# Patient Record
Sex: FEMALE | Race: WHITE | URBAN - METROPOLITAN AREA
[De-identification: names, ages, dates, MRNs, and addresses within clinical notes are randomized per-mention and may not be internally consistent; named-entity substitution may affect disease eponyms.]

---

## 2018-06-05 ENCOUNTER — OUTPATIENT (OUTPATIENT)
Dept: OUTPATIENT SERVICES | Facility: HOSPITAL | Age: 83
LOS: 1 days | End: 2018-06-05
Payer: MEDICARE

## 2018-06-05 DIAGNOSIS — Z80.3 FAMILY HISTORY OF MALIGNANT NEOPLASM OF BREAST: ICD-10-CM

## 2018-06-05 DIAGNOSIS — I10 ESSENTIAL (PRIMARY) HYPERTENSION: ICD-10-CM

## 2018-06-05 DIAGNOSIS — Z01.818 ENCOUNTER FOR OTHER PREPROCEDURAL EXAMINATION: ICD-10-CM

## 2018-06-05 DIAGNOSIS — Z17.0 ESTROGEN RECEPTOR POSITIVE STATUS [ER+]: ICD-10-CM

## 2018-06-05 DIAGNOSIS — C50.412 MALIGNANT NEOPLASM OF UPPER-OUTER QUADRANT OF LEFT FEMALE BREAST: ICD-10-CM

## 2018-06-05 PROCEDURE — 93010 ELECTROCARDIOGRAM REPORT: CPT | Mod: NC

## 2018-06-07 ENCOUNTER — RESULT REVIEW (OUTPATIENT)
Age: 83
End: 2018-06-07

## 2018-06-07 PROCEDURE — 85027 COMPLETE CBC AUTOMATED: CPT

## 2018-06-07 PROCEDURE — G0463: CPT

## 2018-06-07 PROCEDURE — 80048 BASIC METABOLIC PNL TOTAL CA: CPT

## 2018-06-07 PROCEDURE — 93005 ELECTROCARDIOGRAM TRACING: CPT

## 2018-06-07 PROCEDURE — 36415 COLL VENOUS BLD VENIPUNCTURE: CPT

## 2018-06-14 ENCOUNTER — TRANSCRIPTION ENCOUNTER (OUTPATIENT)
Age: 83
End: 2018-06-14

## 2018-06-15 ENCOUNTER — OUTPATIENT (OUTPATIENT)
Dept: OUTPATIENT SERVICES | Facility: HOSPITAL | Age: 83
LOS: 1 days | End: 2018-06-15
Payer: MEDICARE

## 2018-06-15 ENCOUNTER — RESULT REVIEW (OUTPATIENT)
Age: 83
End: 2018-06-15

## 2018-06-15 DIAGNOSIS — Z17.0 ESTROGEN RECEPTOR POSITIVE STATUS [ER+]: ICD-10-CM

## 2018-06-15 DIAGNOSIS — Z80.3 FAMILY HISTORY OF MALIGNANT NEOPLASM OF BREAST: ICD-10-CM

## 2018-06-15 DIAGNOSIS — C50.412 MALIGNANT NEOPLASM OF UPPER-OUTER QUADRANT OF LEFT FEMALE BREAST: ICD-10-CM

## 2018-06-15 PROCEDURE — 19281 PERQ DEVICE BREAST 1ST IMAG: CPT

## 2018-06-15 PROCEDURE — 88305 TISSUE EXAM BY PATHOLOGIST: CPT

## 2018-06-15 PROCEDURE — 88307 TISSUE EXAM BY PATHOLOGIST: CPT | Mod: 26

## 2018-06-15 PROCEDURE — 19301 PARTIAL MASTECTOMY: CPT | Mod: LT

## 2018-06-15 PROCEDURE — 88307 TISSUE EXAM BY PATHOLOGIST: CPT

## 2018-06-15 PROCEDURE — C1889: CPT

## 2018-06-15 PROCEDURE — 38525 BIOPSY/REMOVAL LYMPH NODES: CPT | Mod: LT

## 2018-06-15 PROCEDURE — 76098 X-RAY EXAM SURGICAL SPECIMEN: CPT

## 2018-06-15 PROCEDURE — 88305 TISSUE EXAM BY PATHOLOGIST: CPT | Mod: 26

## 2018-12-02 ENCOUNTER — EMERGENCY (EMERGENCY)
Facility: HOSPITAL | Age: 83
LOS: 1 days | End: 2018-12-02
Attending: EMERGENCY MEDICINE
Payer: MEDICARE

## 2018-12-02 VITALS
RESPIRATION RATE: 14 BRPM | HEART RATE: 55 BPM | SYSTOLIC BLOOD PRESSURE: 120 MMHG | OXYGEN SATURATION: 96 % | TEMPERATURE: 97 F | HEIGHT: 64 IN | DIASTOLIC BLOOD PRESSURE: 71 MMHG | WEIGHT: 158.07 LBS

## 2018-12-02 VITALS — TEMPERATURE: 98 F | RESPIRATION RATE: 17 BRPM | OXYGEN SATURATION: 98 %

## 2018-12-02 LAB
ALBUMIN SERPL ELPH-MCNC: 3.4 G/DL — SIGNIFICANT CHANGE UP (ref 3.3–5)
ALP SERPL-CCNC: 67 U/L — SIGNIFICANT CHANGE UP (ref 40–120)
ALT FLD-CCNC: 17 U/L — SIGNIFICANT CHANGE UP (ref 12–78)
ANION GAP SERPL CALC-SCNC: 9 MMOL/L — SIGNIFICANT CHANGE UP (ref 5–17)
AST SERPL-CCNC: 20 U/L — SIGNIFICANT CHANGE UP (ref 15–37)
BASOPHILS # BLD AUTO: 0.02 K/UL — SIGNIFICANT CHANGE UP (ref 0–0.2)
BASOPHILS NFR BLD AUTO: 0.3 % — SIGNIFICANT CHANGE UP (ref 0–2)
BILIRUB SERPL-MCNC: 0.4 MG/DL — SIGNIFICANT CHANGE UP (ref 0.2–1.2)
BUN SERPL-MCNC: 25 MG/DL — HIGH (ref 7–23)
CALCIUM SERPL-MCNC: 8.4 MG/DL — LOW (ref 8.5–10.1)
CHLORIDE SERPL-SCNC: 104 MMOL/L — SIGNIFICANT CHANGE UP (ref 96–108)
CK MB BLD-MCNC: <1.6 % — SIGNIFICANT CHANGE UP (ref 0–3.5)
CK MB CFR SERPL CALC: <1 NG/ML — SIGNIFICANT CHANGE UP (ref 0–3.6)
CK SERPL-CCNC: 62 U/L — SIGNIFICANT CHANGE UP (ref 26–192)
CO2 SERPL-SCNC: 26 MMOL/L — SIGNIFICANT CHANGE UP (ref 22–31)
CREAT SERPL-MCNC: 0.85 MG/DL — SIGNIFICANT CHANGE UP (ref 0.5–1.3)
EOSINOPHIL # BLD AUTO: 0.2 K/UL — SIGNIFICANT CHANGE UP (ref 0–0.5)
EOSINOPHIL NFR BLD AUTO: 2.6 % — SIGNIFICANT CHANGE UP (ref 0–6)
GLUCOSE SERPL-MCNC: 148 MG/DL — HIGH (ref 70–99)
HCT VFR BLD CALC: 34.2 % — LOW (ref 34.5–45)
HGB BLD-MCNC: 11.5 G/DL — SIGNIFICANT CHANGE UP (ref 11.5–15.5)
IMM GRANULOCYTES NFR BLD AUTO: 0.6 % — SIGNIFICANT CHANGE UP (ref 0–1.5)
LYMPHOCYTES # BLD AUTO: 1.81 K/UL — SIGNIFICANT CHANGE UP (ref 1–3.3)
LYMPHOCYTES # BLD AUTO: 23.4 % — SIGNIFICANT CHANGE UP (ref 13–44)
MCHC RBC-ENTMCNC: 30.6 PG — SIGNIFICANT CHANGE UP (ref 27–34)
MCHC RBC-ENTMCNC: 33.6 GM/DL — SIGNIFICANT CHANGE UP (ref 32–36)
MCV RBC AUTO: 91 FL — SIGNIFICANT CHANGE UP (ref 80–100)
MONOCYTES # BLD AUTO: 1.85 K/UL — HIGH (ref 0–0.9)
MONOCYTES NFR BLD AUTO: 23.9 % — HIGH (ref 2–14)
NEUTROPHILS # BLD AUTO: 3.82 K/UL — SIGNIFICANT CHANGE UP (ref 1.8–7.4)
NEUTROPHILS NFR BLD AUTO: 49.2 % — SIGNIFICANT CHANGE UP (ref 43–77)
PLATELET # BLD AUTO: 170 K/UL — SIGNIFICANT CHANGE UP (ref 150–400)
POTASSIUM SERPL-MCNC: 3.5 MMOL/L — SIGNIFICANT CHANGE UP (ref 3.5–5.3)
POTASSIUM SERPL-SCNC: 3.5 MMOL/L — SIGNIFICANT CHANGE UP (ref 3.5–5.3)
PROT SERPL-MCNC: 7.4 G/DL — SIGNIFICANT CHANGE UP (ref 6–8.3)
RBC # BLD: 3.76 M/UL — LOW (ref 3.8–5.2)
RBC # FLD: 12.6 % — SIGNIFICANT CHANGE UP (ref 10.3–14.5)
SODIUM SERPL-SCNC: 139 MMOL/L — SIGNIFICANT CHANGE UP (ref 135–145)
TROPONIN I SERPL-MCNC: <.015 NG/ML — SIGNIFICANT CHANGE UP (ref 0.01–0.04)
WBC # BLD: 7.75 K/UL — SIGNIFICANT CHANGE UP (ref 3.8–10.5)
WBC # FLD AUTO: 7.75 K/UL — SIGNIFICANT CHANGE UP (ref 3.8–10.5)

## 2018-12-02 PROCEDURE — 82550 ASSAY OF CK (CPK): CPT

## 2018-12-02 PROCEDURE — 85027 COMPLETE CBC AUTOMATED: CPT

## 2018-12-02 PROCEDURE — 99284 EMERGENCY DEPT VISIT MOD MDM: CPT

## 2018-12-02 PROCEDURE — 80053 COMPREHEN METABOLIC PANEL: CPT

## 2018-12-02 PROCEDURE — 71045 X-RAY EXAM CHEST 1 VIEW: CPT | Mod: 26

## 2018-12-02 PROCEDURE — 93005 ELECTROCARDIOGRAM TRACING: CPT

## 2018-12-02 PROCEDURE — 36415 COLL VENOUS BLD VENIPUNCTURE: CPT

## 2018-12-02 PROCEDURE — 84484 ASSAY OF TROPONIN QUANT: CPT

## 2018-12-02 PROCEDURE — 82553 CREATINE MB FRACTION: CPT

## 2018-12-02 PROCEDURE — 99284 EMERGENCY DEPT VISIT MOD MDM: CPT | Mod: 25

## 2018-12-02 PROCEDURE — 71045 X-RAY EXAM CHEST 1 VIEW: CPT

## 2018-12-02 PROCEDURE — 99285 EMERGENCY DEPT VISIT HI MDM: CPT

## 2018-12-02 RX ORDER — SODIUM CHLORIDE 9 MG/ML
1000 INJECTION INTRAMUSCULAR; INTRAVENOUS; SUBCUTANEOUS ONCE
Qty: 0 | Refills: 0 | Status: COMPLETED | OUTPATIENT
Start: 2018-12-02 | End: 2018-12-02

## 2018-12-02 RX ADMIN — SODIUM CHLORIDE 1000 MILLILITER(S): 9 INJECTION INTRAMUSCULAR; INTRAVENOUS; SUBCUTANEOUS at 12:18

## 2018-12-02 NOTE — ED PROVIDER NOTE - CHPI ED SYMPTOMS NEG
no diaphoresis/no cough/no nausea/no shortness of breath/no chest pain/no back pain/no vomiting/no dizziness/no fever/no chills

## 2018-12-02 NOTE — CONSULT NOTE ADULT - SUBJECTIVE AND OBJECTIVE BOX
[facility] - Shanell Martinez, Danae, [place], Florencio, Ran Jameson  Office Number: [phone]    Initial Consult Note    CHIEF COMPLAINT: Patient is a 83y old  Female who presents with a chief complaint of syncope    HPI:  84 y/o female biba s/p syncopal episode x 1 hour ago. Pt states she was standing at [employer] and then she became slightly dizzy. States she then sat down but she thinks she passed out for a few seconds. As per pts friends who witness episode, they are not sure if pt lost consciousness at all as she was answering her friends right away after syncopal episode. Pt asymptomatic in ED. Denies any other complaints. States she feels good. States she had cereal for breakfast this morning as per usual. Denies n/v, f/c, chest pain, sob, numbness, tingling, headache, visual changes.    Has not seen a cardiologist in the past. Did not eat or drink 1 hour prior to [employer].  Has a history of HTN and breast ca.  s/p radiation for breast ca, recently started on arimidex.  On 4 anti-HTN medications (HCTZ, atenolol, Lisinopril and Norvasc)  No change in exercise tolerance recently, and she is generally very active. She does report some reflux symptoms since Thanksgiving.    PAST MEDICAL & SURGICAL HISTORY:  Hypertension  No significant past surgical history      SOCIAL HISTORY:  No tobacco, ethanol, or drug abuse.    FAMILY HISTORY:    No family history of acute MI or sudden cardiac death.    MEDICATIONS  (STANDING):    MEDICATIONS  (PRN):      Allergies    No Known Allergies    Intolerances        REVIEW OF SYSTEMS:    CONSTITUTIONAL: No weakness, fevers or chills  EYES/ENT: No visual changes;  No vertigo or throat pain   NECK: No pain or stiffness  RESPIRATORY: No cough, wheezing, hemoptysis; No shortness of breath  CARDIOVASCULAR: No chest pain or palpitations  GASTROINTESTINAL: No abdominal pain. No nausea, vomiting, or hematemesis; No diarrhea or constipation. No melena or hematochezia.  GENITOURINARY: No dysuria, frequency or hematuria  NEUROLOGICAL: No numbness or weakness, + dizziness   SKIN: No itching or rash  All other review of systems is negative unless indicated above    VITAL SIGNS:   Vital Signs Last 24 Hrs  T(C): 36.4 (02 Dec 2018 13:51), Max: 36.4 (02 Dec 2018 13:51)  T(F): 97.6 (02 Dec 2018 13:51), Max: 97.6 (02 Dec 2018 13:51)  HR: 55 (02 Dec 2018 12:17) (55 - 55)  BP: 134/59 (02 Dec 2018 12:17) (120/71 - 134/59)  BP(mean): --  RR: 17 (02 Dec 2018 13:51) (14 - 17)  SpO2: 98% (02 Dec 2018 13:51) (96% - 99%)    I&O's Summary      On Exam:    Constitutional: NAD, alert and oriented x 3  Lungs:  Non-labored, breath sounds are clear bilaterally, No wheezing, rales or rhonchi  Cardiovascular: RRR.  S1 and S2 positive.  No murmurs, rubs, gallops or clicks  Gastrointestinal: Bowel Sounds present, soft, nontender.   Lymph: No peripheral edema. No cervical lymphadenopathy.  Neurological: Alert, no focal deficits  Skin: No rashes or ulcers   Psych:  Mood & affect appropriate.    LABS: All Labs Reviewed:                        11.5   7.75  )-----------( 170      ( 02 Dec 2018 12:23 )             34.2     02 Dec 2018 12:23    139    |  104    |  25     ----------------------------<  148    3.5     |  26     |  0.85     Ca    8.4        02 Dec 2018 12:23    TPro  7.4    /  Alb  3.4    /  TBili  0.4    /  DBili  x      /  AST  20     /  ALT  17     /  AlkPhos  67     02 Dec 2018 12:23      CARDIAC MARKERS ( 02 Dec 2018 12:23 )  <.015 ng/mL / x     / 62 U/L / x     / <1.0 ng/mL      Blood Culture:         RADIOLOGY:    EKG: SB, LAD, ant t wave inversions

## 2018-12-02 NOTE — ED PROVIDER NOTE - PHYSICAL EXAMINATION
Head- NC/AT. No gonzales signs, no raccoon eyes, no hemotympanum, no contusions, no hematoma, no dental injuries.  Spine- no midline or paraspinal tenderness of cspine, thoracic spine or lumbar spine. No signs of back trauma, no masses, no abrasions, no lacerations, no redness, no bruising.  Neck- supple, no midline tenderness to palpation, + FROM, no abrasions, no ecchymosis.  Neuro- EOM intact, no nystagmus. Pelvis stable. Pt able to straight leg raise BL. NVI, good distal pulses x 4 extremities, capillary refill <2 sec x 4 extremities, sensation intact throughout, 5/5 motor x 4 extremities. Cerebellar exam good. Gait normal without limp. Negative Rombergs test. DTRs normal x 4 extremities.  Chest- no chest wall tenderness, equal expansion BL. No clavicular tenderness BL. No bruising, no deformity, no redness, no abrasions.  Extremities- No bony tenderness of upper or lower extremities BL except where noted. FROM shoulders, elbows, wrists, hips, knees, ankles. NVI, good distal pulses x 4 extremities, capillary refill <2 sec x 4 extremities, sensation intact throughout, 5/5 motor x 4 extremities. No calf tenderness BL. No bruising, no deformity, no redness, no swelling.

## 2018-12-02 NOTE — ED PROVIDER NOTE - OBJECTIVE STATEMENT
84 y/o female biba s/p syncopal episode x 1 hour ago. Pt states she was standing at [employer] and then she became slightly dizzy. States she then sat down but she thinks she passed out for a few seconds. As per pts friends who witness episode, they are not sure if pt lost consciousness at all as she was answering her friends right away after syncopal episode. Pt asymptomatic in ED. Denies any other complaints. States she feels good. States she had cereal for breakfast this morning as per usual. Denies n/v, f/c, chest pain, sob, numbness, tingling, headache, visual changes.

## 2018-12-02 NOTE — ED PROVIDER NOTE - MEDICAL DECISION MAKING DETAILS
labs, cardiacs, cardiac monitor, orthostatic BP/HR, fluids, cardiac consult, reeval labs, cardiacs, cardiac monitor, orthostatic BP/HR WNL, fluids, cardiac consult, reeval  Please follow up with your Primary MD in 24-48 hr. Please follow up with cardiology Dr Tong within 3 days- call tomorrow for an appointment. Keep hydrated with plenty of fluids. Return to ED immediately if condition worsens or any concerns.  Seek immediate medical care for any new/worsening signs or symptoms.

## 2018-12-02 NOTE — ED ADULT NURSE NOTE - OBJECTIVE STATEMENT
Pt. received alert and oriented x4 with chief complaint of near syncope while standing in [employer]. Pt. states she was feeling dizzy upon waking up this morning. Pt. denies LOC. Near syncope witnessed by 2 people sitting next to patient. Pt. placed on continuous cardiac monitor with continuous pulse ox. EKG performed at bedside upon arrival.

## 2018-12-02 NOTE — ED PROVIDER NOTE - ATTENDING CONTRIBUTION TO CARE
82 yo F p/w was standing for prolonged period at [employer], then felt dizzy and passed out x few sec. NO sz activity. no trauma. no cp/sob/palp. no numb/ting/focal weak. no CP/sob. no recent MAHAN / easy fatigue. no numb/ting/focal weak. no agg/allev factors. no other inj or co.  exam : mm moist,  neck supple. no ext signs of head trauma. No spinal tend. abd soft NT. no HSM. no cvat. no other acute findings.  labs / xr with no acute, pt seen and cleared by cardio for outpt f u

## 2018-12-02 NOTE — CONSULT NOTE ADULT - ASSESSMENT
Mrs. Winchester is an 83 year old female with HTN and breast ca s/p radiation, presenting with a syncopal episode at [employer]. She has had similar episodes like this in the past, with a typical prodrome.     - The episode sounds vagal in origin. I have recommended that she stay hydrated and eat before [employer]  - Check orthostatics. Agree with IVF.  - She is also on 4 anti-hypertensives, which may be too much. She is to check her BP at home and record them for me prior to changing the regimen.  - No sign of acute ischemia. EKG is a SR with LAD and non-specific T wave abn. She is to have an echocardiogram in our office.  - Troponin is negative.  - I do not suspect a bradyarrhythmia at this time.  - She will follow up with me in office in 1-2 weeks. No cardiac contraindication to d/c home

## 2018-12-02 NOTE — ED PROVIDER NOTE - PROGRESS NOTE DETAILS
spoke with cardiology Dr Tong who will come see pt. Dr Tong saw pt. Dr Tong states likely vasovagal episode. pt to f/u with dr tong out pt. Reevaluated patient at bedside.  Patient feeling much improved.  Discussed the results of all diagnostic testing in ED and copies of all reports given.   An opportunity to ask questions was given.  Discussed the importance of prompt, close medical follow-up.  Patient will return with any changes, concerns or persistent / worsening symptoms.  Understanding of all instructions verbalized.

## 2018-12-02 NOTE — ED ADULT NURSE NOTE - CHIEF COMPLAINT QUOTE
"I passed out in [employer]."  pt was standing for prayer, syncopized and fell forward, did not hit head

## 2018-12-02 NOTE — ED ADULT TRIAGE NOTE - CHIEF COMPLAINT QUOTE
"I passed out in [employer]."  pt was standing for prayer, syncopized and fell forward, did not hit head

## 2018-12-05 ENCOUNTER — APPOINTMENT (OUTPATIENT)
Dept: CARDIOLOGY | Facility: CLINIC | Age: 83
End: 2018-12-05
Payer: MEDICARE

## 2018-12-05 ENCOUNTER — NON-APPOINTMENT (OUTPATIENT)
Age: 83
End: 2018-12-05

## 2018-12-05 VITALS
OXYGEN SATURATION: 96 % | HEIGHT: 64 IN | BODY MASS INDEX: 25.61 KG/M2 | DIASTOLIC BLOOD PRESSURE: 69 MMHG | WEIGHT: 150 LBS | SYSTOLIC BLOOD PRESSURE: 147 MMHG | HEART RATE: 61 BPM

## 2018-12-05 DIAGNOSIS — Z85.3 PERSONAL HISTORY OF MALIGNANT NEOPLASM OF BREAST: ICD-10-CM

## 2018-12-05 DIAGNOSIS — R07.9 CHEST PAIN, UNSPECIFIED: ICD-10-CM

## 2018-12-05 PROBLEM — I10 ESSENTIAL (PRIMARY) HYPERTENSION: Chronic | Status: ACTIVE | Noted: 2018-12-02

## 2018-12-05 PROCEDURE — 93000 ELECTROCARDIOGRAM COMPLETE: CPT

## 2018-12-05 PROCEDURE — 99215 OFFICE O/P EST HI 40 MIN: CPT

## 2018-12-05 NOTE — HISTORY OF PRESENT ILLNESS
[FreeTextEntry1] : Kelsey is an 83 year old female with HTN, here for follow up after an ER visit for syncope.\par \par Her past medical history notable for hypertension, and breast cancer status post radiation. She has not been feeling well since she was started on Arimidex.\par \par On December 2, 2018, she had a syncopal episode while at [employer]. She remembers standing up, feeling flushed, dizzy, lightheaded, with a short loss of consciousness. A similar episode occurred about 5 years ago at [employer].\par \par She has not seen a cardiologist in the past. She did not drink or eat a large breakfast prior to [employer]. She is currently on 4 antihypertensive medications including HCTZ, atenolol, lisinopril, and amlodipine.\par \par She also reports a mild chest pain, and reflux symptoms since Thanksgiving. She denies shortness of breath, and a change in exercise tolerance. She also denies lower extremity swelling, orthopnea, PND and palpitations. She has not had any symptoms since stopping Arimidex.

## 2018-12-05 NOTE — PHYSICAL EXAM
[General Appearance - Well Developed] : well developed [Normal Appearance] : normal appearance [Well Groomed] : well groomed [General Appearance - Well Nourished] : well nourished [No Deformities] : no deformities [General Appearance - In No Acute Distress] : no acute distress [Normal Conjunctiva] : the conjunctiva exhibited no abnormalities [Eyelids - No Xanthelasma] : the eyelids demonstrated no xanthelasmas [Normal Oral Mucosa] : normal oral mucosa [No Oral Pallor] : no oral pallor [No Oral Cyanosis] : no oral cyanosis [Normal Jugular Venous A Waves Present] : normal jugular venous A waves present [Normal Jugular Venous V Waves Present] : normal jugular venous V waves present [No Jugular Venous Ahmadi A Waves] : no jugular venous ahmadi A waves [Respiration, Rhythm And Depth] : normal respiratory rhythm and effort [Exaggerated Use Of Accessory Muscles For Inspiration] : no accessory muscle use [Auscultation Breath Sounds / Voice Sounds] : lungs were clear to auscultation bilaterally [Normal Rate] : normal [Normal S1] : normal S1 [Normal S2] : normal S2 [No Murmur] : no murmurs heard [2+] : left 2+ [No Abnormalities] : the abdominal aorta was not enlarged and no bruit was heard [No Pitting Edema] : no pitting edema present [Abdomen Soft] : soft [Abdomen Tenderness] : non-tender [Abdomen Mass (___ Cm)] : no abdominal mass palpated [Abnormal Walk] : normal gait [Gait - Sufficient For Exercise Testing] : the gait was sufficient for exercise testing [Nail Clubbing] : no clubbing of the fingernails [Cyanosis, Localized] : no localized cyanosis [Petechial Hemorrhages (___cm)] : no petechial hemorrhages [Skin Color & Pigmentation] : normal skin color and pigmentation [] : no rash [No Venous Stasis] : no venous stasis [Skin Lesions] : no skin lesions [No Skin Ulcers] : no skin ulcer [No Xanthoma] : no  xanthoma was observed [Oriented To Time, Place, And Person] : oriented to person, place, and time [Affect] : the affect was normal [Mood] : the mood was normal [No Anxiety] : not feeling anxious [S3] : no S3 [S4] : no S4 [Right Carotid Bruit] : no bruit heard over the right carotid [Left Carotid Bruit] : no bruit heard over the left carotid [Right Femoral Bruit] : no bruit heard over the right femoral artery [Left Femoral Bruit] : no bruit heard over the left femoral artery

## 2018-12-05 NOTE — REASON FOR VISIT
[Follow-Up - From Hospitalization] : follow-up of a recent hospitalization for [Syncope] : syncope [Family Member] : family member

## 2018-12-05 NOTE — DISCUSSION/SUMMARY
[___ Month(s)] : [unfilled] month(s) [With Me] : with me [FreeTextEntry1] : Kelsey is an 83 year old female with HTN, here for follow up after an ER visit for syncope.\par \par Her syncopal episode sounds vagal in origin. She is mildly orthostatic today, which suggests this etiology ( sitting, 134 standing_. She is on 4 antihypertensive medicines, and it is likely that her pressure dropped during the event.\par \par I recommended that she stay hydrated, and to avoid frequent sitting and standing at [employer]. Her EKG is a sinus rhythm with a left axis deviation, and poor R-wave progression. She'll have a 2-D echo to evaluate for structural heart disease in the setting of this abnormal EKG, chest pain, and syncope. She will also have an nuclear stress test to evaluate for ischemia.\par \par Her blood pressure is elevated, though she did not take any of her pills today. I recommended to the patient and son to check her blood pressures at home for the next 2 weeks and to call with results. If her BPs are controlled, I will likely stop her diuretic pill, in the setting of possible dehydration and syncope.\par \par I will call her with the results of the above tests. She knows to call if any issues or concerns. She will stay active, and eat right. She will follow up with me in 1-2 months.

## 2018-12-19 ENCOUNTER — APPOINTMENT (OUTPATIENT)
Dept: CARDIOLOGY | Facility: CLINIC | Age: 83
End: 2018-12-19
Payer: MEDICARE

## 2018-12-19 PROCEDURE — 93306 TTE W/DOPPLER COMPLETE: CPT

## 2018-12-20 ENCOUNTER — EMERGENCY (EMERGENCY)
Facility: HOSPITAL | Age: 83
LOS: 1 days | Discharge: HOME | End: 2018-12-20
Attending: EMERGENCY MEDICINE | Admitting: EMERGENCY MEDICINE
Payer: MEDICARE

## 2018-12-20 VITALS
HEART RATE: 86 BPM | HEIGHT: 64 IN | OXYGEN SATURATION: 97 % | DIASTOLIC BLOOD PRESSURE: 100 MMHG | RESPIRATION RATE: 16 BRPM | TEMPERATURE: 98 F | WEIGHT: 149.91 LBS | SYSTOLIC BLOOD PRESSURE: 178 MMHG

## 2018-12-20 VITALS
DIASTOLIC BLOOD PRESSURE: 71 MMHG | OXYGEN SATURATION: 97 % | SYSTOLIC BLOOD PRESSURE: 144 MMHG | RESPIRATION RATE: 15 BRPM | TEMPERATURE: 98 F | HEART RATE: 58 BPM

## 2018-12-20 DIAGNOSIS — Z98.890 OTHER SPECIFIED POSTPROCEDURAL STATES: Chronic | ICD-10-CM

## 2018-12-20 LAB
ALBUMIN SERPL ELPH-MCNC: 3.7 G/DL — SIGNIFICANT CHANGE UP (ref 3.3–5)
ALP SERPL-CCNC: 76 U/L — SIGNIFICANT CHANGE UP (ref 40–120)
ALT FLD-CCNC: 23 U/L — SIGNIFICANT CHANGE UP (ref 12–78)
ANION GAP SERPL CALC-SCNC: 8 MMOL/L — SIGNIFICANT CHANGE UP (ref 5–17)
APTT BLD: 32.2 SEC — SIGNIFICANT CHANGE UP (ref 28.5–37)
AST SERPL-CCNC: 21 U/L — SIGNIFICANT CHANGE UP (ref 15–37)
BASOPHILS # BLD AUTO: 0.02 K/UL — SIGNIFICANT CHANGE UP (ref 0–0.2)
BASOPHILS NFR BLD AUTO: 0.3 % — SIGNIFICANT CHANGE UP (ref 0–2)
BILIRUB SERPL-MCNC: 0.4 MG/DL — SIGNIFICANT CHANGE UP (ref 0.2–1.2)
BUN SERPL-MCNC: 19 MG/DL — SIGNIFICANT CHANGE UP (ref 7–23)
CALCIUM SERPL-MCNC: 8.9 MG/DL — SIGNIFICANT CHANGE UP (ref 8.5–10.1)
CHLORIDE SERPL-SCNC: 102 MMOL/L — SIGNIFICANT CHANGE UP (ref 96–108)
CK MB CFR SERPL CALC: 1.5 NG/ML — SIGNIFICANT CHANGE UP (ref 0–3.6)
CO2 SERPL-SCNC: 28 MMOL/L — SIGNIFICANT CHANGE UP (ref 22–31)
CREAT SERPL-MCNC: 0.77 MG/DL — SIGNIFICANT CHANGE UP (ref 0.5–1.3)
EOSINOPHIL # BLD AUTO: 0.2 K/UL — SIGNIFICANT CHANGE UP (ref 0–0.5)
EOSINOPHIL NFR BLD AUTO: 3.1 % — SIGNIFICANT CHANGE UP (ref 0–6)
GLUCOSE SERPL-MCNC: 122 MG/DL — HIGH (ref 70–99)
HCT VFR BLD CALC: 36.5 % — SIGNIFICANT CHANGE UP (ref 34.5–45)
HGB BLD-MCNC: 12.5 G/DL — SIGNIFICANT CHANGE UP (ref 11.5–15.5)
IMM GRANULOCYTES NFR BLD AUTO: 0.3 % — SIGNIFICANT CHANGE UP (ref 0–1.5)
INR BLD: 1.06 RATIO — SIGNIFICANT CHANGE UP (ref 0.88–1.16)
LYMPHOCYTES # BLD AUTO: 1.55 K/UL — SIGNIFICANT CHANGE UP (ref 1–3.3)
LYMPHOCYTES # BLD AUTO: 23.8 % — SIGNIFICANT CHANGE UP (ref 13–44)
MCHC RBC-ENTMCNC: 30.9 PG — SIGNIFICANT CHANGE UP (ref 27–34)
MCHC RBC-ENTMCNC: 34.2 GM/DL — SIGNIFICANT CHANGE UP (ref 32–36)
MCV RBC AUTO: 90.1 FL — SIGNIFICANT CHANGE UP (ref 80–100)
MONOCYTES # BLD AUTO: 1.67 K/UL — HIGH (ref 0–0.9)
MONOCYTES NFR BLD AUTO: 25.6 % — HIGH (ref 2–14)
NEUTROPHILS # BLD AUTO: 3.06 K/UL — SIGNIFICANT CHANGE UP (ref 1.8–7.4)
NEUTROPHILS NFR BLD AUTO: 46.9 % — SIGNIFICANT CHANGE UP (ref 43–77)
NRBC # BLD: 0 /100 WBCS — SIGNIFICANT CHANGE UP (ref 0–0)
PLATELET # BLD AUTO: 195 K/UL — SIGNIFICANT CHANGE UP (ref 150–400)
POTASSIUM SERPL-MCNC: 3.4 MMOL/L — LOW (ref 3.5–5.3)
POTASSIUM SERPL-SCNC: 3.4 MMOL/L — LOW (ref 3.5–5.3)
PROT SERPL-MCNC: 7.9 G/DL — SIGNIFICANT CHANGE UP (ref 6–8.3)
PROTHROM AB SERPL-ACNC: 12.1 SEC — SIGNIFICANT CHANGE UP (ref 10–12.9)
RBC # BLD: 4.05 M/UL — SIGNIFICANT CHANGE UP (ref 3.8–5.2)
RBC # FLD: 12.9 % — SIGNIFICANT CHANGE UP (ref 10.3–14.5)
SODIUM SERPL-SCNC: 138 MMOL/L — SIGNIFICANT CHANGE UP (ref 135–145)
TROPONIN I SERPL-MCNC: <.015 NG/ML — SIGNIFICANT CHANGE UP (ref 0.01–0.04)
WBC # BLD: 6.52 K/UL — SIGNIFICANT CHANGE UP (ref 3.8–10.5)
WBC # FLD AUTO: 6.52 K/UL — SIGNIFICANT CHANGE UP (ref 3.8–10.5)

## 2018-12-20 PROCEDURE — 85610 PROTHROMBIN TIME: CPT

## 2018-12-20 PROCEDURE — 99284 EMERGENCY DEPT VISIT MOD MDM: CPT | Mod: 25

## 2018-12-20 PROCEDURE — 99284 EMERGENCY DEPT VISIT MOD MDM: CPT

## 2018-12-20 PROCEDURE — 80053 COMPREHEN METABOLIC PANEL: CPT

## 2018-12-20 PROCEDURE — 85730 THROMBOPLASTIN TIME PARTIAL: CPT

## 2018-12-20 PROCEDURE — 85027 COMPLETE CBC AUTOMATED: CPT

## 2018-12-20 PROCEDURE — 36415 COLL VENOUS BLD VENIPUNCTURE: CPT

## 2018-12-20 PROCEDURE — 82553 CREATINE MB FRACTION: CPT

## 2018-12-20 PROCEDURE — 93005 ELECTROCARDIOGRAM TRACING: CPT

## 2018-12-20 PROCEDURE — 84484 ASSAY OF TROPONIN QUANT: CPT

## 2018-12-20 NOTE — ED ADULT TRIAGE NOTE - CHIEF COMPLAINT QUOTE
feeling lightheaded  since afternoon went to urgent care sent to [facility] for hypertension feeling lightheaded  since afternoon went to urgent care sent to [facility] for hypertension  recently taken off lisinopril 40 & atenolol 100

## 2018-12-20 NOTE — ED PROVIDER NOTE - OBJECTIVE STATEMENT
83 female sent to ER from urgent care c/o dizziness and elevated blood pressure, 195/97, patient state she took her blood pressure medications this morning 8:30am, HCTZ 25mg and amlodipine 5mg. Patient states she ate bowel of soup for lunch and dinner that contained salt.

## 2018-12-20 NOTE — ED ADULT NURSE NOTE - CHPI ED NUR SYMPTOMS NEG
no vomiting/no diaphoresis/no back pain/no shortness of breath/no congestion/no chest pain/no chills/no nausea/no dizziness/no fever/no syncope

## 2018-12-20 NOTE — ED ADULT NURSE NOTE - NSIMPLEMENTINTERV_GEN_ALL_ED
Implemented All Universal Safety Interventions:  [place] to call system. Call bell, personal items and telephone within reach. Instruct patient to call for assistance. Room bathroom lighting operational. Non-slip footwear when patient is off stretcher. Physically safe environment: no spills, clutter or unnecessary equipment. Stretcher in lowest position, wheels locked, appropriate side rails in place.

## 2018-12-20 NOTE — ED PROVIDER NOTE - PROGRESS NOTE DETAILS
patient feeling well, dizziness resolved, told to watch diet regarding salt intake, blood pressure improved, agrees to f/u with cardiologist and PMD, understands to return to ER for worsening of symptoms

## 2018-12-20 NOTE — ED ADULT NURSE NOTE - NS PRO AD PATIENT TYPE
Health Care Proxy (HCP) Inflammation Suggestive Of Cancer Camouflage Histology Text: There was a dense lymphocytic infiltrate which prevented adequate histologic evaluation of adjacent structures.

## 2018-12-20 NOTE — ED ADULT NURSE REASSESSMENT NOTE - NS ED NURSE REASSESS COMMENT FT1
Pts b/p improved -pt d/juancarlos home verbalized understanding of d./c instruction- ambulated with son from ED in stable condition-

## 2018-12-20 NOTE — ED ADULT NURSE NOTE - CHIEF COMPLAINT QUOTE
feeling lightheaded  since afternoon went to urgent care sent to [facility] for hypertension  recently taken off lisinopril 40 & atenolol 100

## 2018-12-31 ENCOUNTER — APPOINTMENT (OUTPATIENT)
Dept: CARDIOLOGY | Facility: CLINIC | Age: 83
End: 2018-12-31
Payer: MEDICARE

## 2018-12-31 PROCEDURE — A9500: CPT

## 2018-12-31 PROCEDURE — 78452 HT MUSCLE IMAGE SPECT MULT: CPT

## 2018-12-31 PROCEDURE — 93015 CV STRESS TEST SUPVJ I&R: CPT

## 2019-01-17 NOTE — ED ADULT NURSE NOTE - AS TEMP SITE
[facility]  Colonoscopy schedule 2/7/19 [facility]  Echo and Angiogram at [facility] done 4/2018. Patient will call and book appointment with his cardiologist for clearance.    oral

## 2019-03-13 ENCOUNTER — TRANSCRIPTION ENCOUNTER (OUTPATIENT)
Age: 84
End: 2019-03-13

## 2019-08-17 ENCOUNTER — TRANSCRIPTION ENCOUNTER (OUTPATIENT)
Age: 84
End: 2019-08-17

## 2019-10-24 ENCOUNTER — APPOINTMENT (OUTPATIENT)
Dept: CARDIOLOGY | Facility: CLINIC | Age: 84
End: 2019-10-24
Payer: MEDICARE

## 2019-10-24 PROCEDURE — 93306 TTE W/DOPPLER COMPLETE: CPT

## 2020-04-09 ENCOUNTER — INPATIENT (INPATIENT)
Facility: HOSPITAL | Age: 85
LOS: 4 days | Discharge: HOME | DRG: 64 | End: 2020-04-14
Attending: INTERNAL MEDICINE | Admitting: INTERNAL MEDICINE
Payer: MEDICARE

## 2020-04-09 VITALS
RESPIRATION RATE: 16 BRPM | TEMPERATURE: 98 F | DIASTOLIC BLOOD PRESSURE: 84 MMHG | SYSTOLIC BLOOD PRESSURE: 187 MMHG | HEART RATE: 65 BPM | OXYGEN SATURATION: 97 % | HEIGHT: 64 IN | WEIGHT: 145.06 LBS

## 2020-04-09 DIAGNOSIS — Z98.890 OTHER SPECIFIED POSTPROCEDURAL STATES: Chronic | ICD-10-CM

## 2020-04-09 LAB
ALBUMIN SERPL ELPH-MCNC: 3.6 G/DL — SIGNIFICANT CHANGE UP (ref 3.3–5)
ALP SERPL-CCNC: 86 U/L — SIGNIFICANT CHANGE UP (ref 40–120)
ALT FLD-CCNC: 18 U/L — SIGNIFICANT CHANGE UP (ref 12–78)
ANION GAP SERPL CALC-SCNC: 9 MMOL/L — SIGNIFICANT CHANGE UP (ref 5–17)
APPEARANCE UR: ABNORMAL
AST SERPL-CCNC: 21 U/L — SIGNIFICANT CHANGE UP (ref 15–37)
BACTERIA # UR AUTO: ABNORMAL
BILIRUB SERPL-MCNC: 0.4 MG/DL — SIGNIFICANT CHANGE UP (ref 0.2–1.2)
BILIRUB UR-MCNC: NEGATIVE — SIGNIFICANT CHANGE UP
BUN SERPL-MCNC: 14 MG/DL — SIGNIFICANT CHANGE UP (ref 7–23)
CALCIUM SERPL-MCNC: 8.9 MG/DL — SIGNIFICANT CHANGE UP (ref 8.5–10.1)
CHLORIDE SERPL-SCNC: 100 MMOL/L — SIGNIFICANT CHANGE UP (ref 96–108)
CO2 SERPL-SCNC: 27 MMOL/L — SIGNIFICANT CHANGE UP (ref 22–31)
COLOR SPEC: YELLOW — SIGNIFICANT CHANGE UP
CREAT SERPL-MCNC: 0.72 MG/DL — SIGNIFICANT CHANGE UP (ref 0.5–1.3)
DIFF PNL FLD: ABNORMAL
EPI CELLS # UR: SIGNIFICANT CHANGE UP
GLUCOSE SERPL-MCNC: 153 MG/DL — HIGH (ref 70–99)
GLUCOSE UR QL: 100 MG/DL
HCT VFR BLD CALC: 39.5 % — SIGNIFICANT CHANGE UP (ref 34.5–45)
HGB BLD-MCNC: 13.6 G/DL — SIGNIFICANT CHANGE UP (ref 11.5–15.5)
KETONES UR-MCNC: ABNORMAL
LACTATE SERPL-SCNC: 1.6 MMOL/L — SIGNIFICANT CHANGE UP (ref 0.7–2)
LEUKOCYTE ESTERASE UR-ACNC: ABNORMAL
LIDOCAIN IGE QN: 215 U/L — SIGNIFICANT CHANGE UP (ref 73–393)
MAGNESIUM SERPL-MCNC: 1.7 MG/DL — SIGNIFICANT CHANGE UP (ref 1.6–2.6)
MCHC RBC-ENTMCNC: 28.8 PG — SIGNIFICANT CHANGE UP (ref 27–34)
MCHC RBC-ENTMCNC: 34.4 GM/DL — SIGNIFICANT CHANGE UP (ref 32–36)
MCV RBC AUTO: 83.7 FL — SIGNIFICANT CHANGE UP (ref 80–100)
NITRITE UR-MCNC: NEGATIVE — SIGNIFICANT CHANGE UP
NRBC # BLD: 0 /100 WBCS — SIGNIFICANT CHANGE UP (ref 0–0)
PH UR: 7 — SIGNIFICANT CHANGE UP (ref 5–8)
PLATELET # BLD AUTO: 154 K/UL — SIGNIFICANT CHANGE UP (ref 150–400)
POTASSIUM SERPL-MCNC: 2.7 MMOL/L — CRITICAL LOW (ref 3.5–5.3)
POTASSIUM SERPL-SCNC: 2.7 MMOL/L — CRITICAL LOW (ref 3.5–5.3)
PROT SERPL-MCNC: 8.6 G/DL — HIGH (ref 6–8.3)
PROT UR-MCNC: 30 MG/DL
RBC # BLD: 4.72 M/UL — SIGNIFICANT CHANGE UP (ref 3.8–5.2)
RBC # FLD: 12.8 % — SIGNIFICANT CHANGE UP (ref 10.3–14.5)
SODIUM SERPL-SCNC: 136 MMOL/L — SIGNIFICANT CHANGE UP (ref 135–145)
SP GR SPEC: 1 — LOW (ref 1.01–1.02)
TROPONIN I SERPL-MCNC: <.015 NG/ML — SIGNIFICANT CHANGE UP (ref 0.01–0.04)
UROBILINOGEN FLD QL: NEGATIVE — SIGNIFICANT CHANGE UP
WBC # BLD: 6.69 K/UL — SIGNIFICANT CHANGE UP (ref 3.8–10.5)
WBC # FLD AUTO: 6.69 K/UL — SIGNIFICANT CHANGE UP (ref 3.8–10.5)
WBC UR QL: SIGNIFICANT CHANGE UP

## 2020-04-09 PROCEDURE — 93010 ELECTROCARDIOGRAM REPORT: CPT

## 2020-04-09 PROCEDURE — 71045 X-RAY EXAM CHEST 1 VIEW: CPT | Mod: 26

## 2020-04-09 RX ORDER — SODIUM CHLORIDE 9 MG/ML
1000 INJECTION INTRAMUSCULAR; INTRAVENOUS; SUBCUTANEOUS ONCE
Refills: 0 | Status: COMPLETED | OUTPATIENT
Start: 2020-04-09 | End: 2020-04-09

## 2020-04-09 RX ADMIN — SODIUM CHLORIDE 1000 MILLILITER(S): 9 INJECTION INTRAMUSCULAR; INTRAVENOUS; SUBCUTANEOUS at 22:09

## 2020-04-09 NOTE — ED PROVIDER NOTE - OBJECTIVE STATEMENT
Pt is a very functional 83 yo female with hx htn and sp lumpectomy in past and receives herceptin infusions q3 week, last 2 weeks ago. pt has been fine until today. pt last had normal conversation at 4:30 and tonight pt confused.  time of onset unknown. no recent trauma, f/c, n/v or any other complaints.  pt brought to er    pmd: gareth  cards: ottoniel gregorio  onc: chinmay brown

## 2020-04-09 NOTE — ED PROVIDER NOTE - PROGRESS NOTE DETAILS
S.O from Dr Reno to Dr Mckinley, call out to Dr Wilfrid Teran covering physician for Dr Callahan discussed case with Dr JOSIAS Teran AMS and bacteria on urinary straight Cath, will Rx Rocephin and COVID still pending

## 2020-04-09 NOTE — ED ADULT NURSE NOTE - NSIMPLEMENTINTERV_GEN_ALL_ED
Implemented All Fall Risk Interventions:  [place] to call system. Call bell, personal items and telephone within reach. Instruct patient to call for assistance. Room bathroom lighting operational. Non-slip footwear when patient is off stretcher. Physically safe environment: no spills, clutter or unnecessary equipment. Stretcher in lowest position, wheels locked, appropriate side rails in place. Provide visual cue, wrist band, yellow gown, etc. Monitor gait and stability. Monitor for mental status changes and reorient to person, place, and time. Review medications for side effects contributing to fall risk. Reinforce activity limits and safety measures with patient and family.

## 2020-04-09 NOTE — ED ADULT TRIAGE NOTE - SOURCE OF INFORMATION
Per Dr. Park verbal order:    3\" ace wrap and arm sling was applied to pts right arm, pt verbalized comfort and stability upon application. Home care instructions were reviewed with pt, pt verbalized understanding with no questions or concerns at this time.    Patient

## 2020-04-09 NOTE — ED PROVIDER NOTE - CONSTITUTIONAL, MLM
normal... Well appearing, awake, alert, oriented to person confused, weak,  and in no apparent distress.

## 2020-04-09 NOTE — ED PROVIDER NOTE - CARE PLAN
Principal Discharge DX:	Altered mental status, unspecified Principal Discharge DX:	Altered mental status, unspecified  Goal:	R/O COVID  Secondary Diagnosis:	UTI (urinary tract infection)

## 2020-04-09 NOTE — ED PROVIDER NOTE - CLINICAL SUMMARY MEDICAL DECISION MAKING FREE TEXT BOX
Pt is an 85 yo with ams beginning after 4:30 pm tonight. pt without f,c,  no focal deficits, check ct head, labs, ekg, ua to ro infection, cxr ro infiltrated, covid test, check electrolytes and trop.  iv fluids, reeval

## 2020-04-09 NOTE — ED PROVIDER NOTE - NEUROLOGICAL, MLM
Alert and oriented x 1, cn 2-12 intact, speech fluent, no focal deficits, no motor or sensory deficits.  not fully cooperative with exam

## 2020-04-10 ENCOUNTER — TRANSCRIPTION ENCOUNTER (OUTPATIENT)
Age: 85
End: 2020-04-10

## 2020-04-10 DIAGNOSIS — Z71.89 OTHER SPECIFIED COUNSELING: ICD-10-CM

## 2020-04-10 DIAGNOSIS — E87.6 HYPOKALEMIA: ICD-10-CM

## 2020-04-10 DIAGNOSIS — Z29.9 ENCOUNTER FOR PROPHYLACTIC MEASURES, UNSPECIFIED: ICD-10-CM

## 2020-04-10 DIAGNOSIS — R41.82 ALTERED MENTAL STATUS, UNSPECIFIED: ICD-10-CM

## 2020-04-10 DIAGNOSIS — I10 ESSENTIAL (PRIMARY) HYPERTENSION: ICD-10-CM

## 2020-04-10 DIAGNOSIS — C50.919 MALIGNANT NEOPLASM OF UNSPECIFIED SITE OF UNSPECIFIED FEMALE BREAST: ICD-10-CM

## 2020-04-10 DIAGNOSIS — G93.41 METABOLIC ENCEPHALOPATHY: ICD-10-CM

## 2020-04-10 LAB
ALBUMIN SERPL ELPH-MCNC: 3.2 G/DL — LOW (ref 3.3–5)
ALP SERPL-CCNC: 72 U/L — SIGNIFICANT CHANGE UP (ref 40–120)
ALT FLD-CCNC: 14 U/L — SIGNIFICANT CHANGE UP (ref 12–78)
ANION GAP SERPL CALC-SCNC: 9 MMOL/L — SIGNIFICANT CHANGE UP (ref 5–17)
AST SERPL-CCNC: 19 U/L — SIGNIFICANT CHANGE UP (ref 15–37)
BASOPHILS # BLD AUTO: 0 K/UL — SIGNIFICANT CHANGE UP (ref 0–0.2)
BASOPHILS NFR BLD AUTO: 0 % — SIGNIFICANT CHANGE UP (ref 0–2)
BILIRUB SERPL-MCNC: 0.6 MG/DL — SIGNIFICANT CHANGE UP (ref 0.2–1.2)
BUN SERPL-MCNC: 12 MG/DL — SIGNIFICANT CHANGE UP (ref 7–23)
CALCIUM SERPL-MCNC: 8.7 MG/DL — SIGNIFICANT CHANGE UP (ref 8.5–10.1)
CHLORIDE SERPL-SCNC: 99 MMOL/L — SIGNIFICANT CHANGE UP (ref 96–108)
CO2 SERPL-SCNC: 27 MMOL/L — SIGNIFICANT CHANGE UP (ref 22–31)
CREAT SERPL-MCNC: 0.71 MG/DL — SIGNIFICANT CHANGE UP (ref 0.5–1.3)
EOSINOPHIL # BLD AUTO: 0 K/UL — SIGNIFICANT CHANGE UP (ref 0–0.5)
EOSINOPHIL NFR BLD AUTO: 0 % — SIGNIFICANT CHANGE UP (ref 0–6)
GLUCOSE SERPL-MCNC: 119 MG/DL — HIGH (ref 70–99)
HCT VFR BLD CALC: 36.6 % — SIGNIFICANT CHANGE UP (ref 34.5–45)
HGB BLD-MCNC: 13 G/DL — SIGNIFICANT CHANGE UP (ref 11.5–15.5)
LYMPHOCYTES # BLD AUTO: 2.3 K/UL — SIGNIFICANT CHANGE UP (ref 1–3.3)
LYMPHOCYTES # BLD AUTO: 24 % — SIGNIFICANT CHANGE UP (ref 13–44)
MCHC RBC-ENTMCNC: 29.5 PG — SIGNIFICANT CHANGE UP (ref 27–34)
MCHC RBC-ENTMCNC: 35.5 GM/DL — SIGNIFICANT CHANGE UP (ref 32–36)
MCV RBC AUTO: 83.2 FL — SIGNIFICANT CHANGE UP (ref 80–100)
MONOCYTES # BLD AUTO: 1.34 K/UL — HIGH (ref 0–0.9)
MONOCYTES NFR BLD AUTO: 14 % — SIGNIFICANT CHANGE UP (ref 2–14)
NEUTROPHILS # BLD AUTO: 5.76 K/UL — SIGNIFICANT CHANGE UP (ref 1.8–7.4)
NEUTROPHILS NFR BLD AUTO: 58 % — SIGNIFICANT CHANGE UP (ref 43–77)
NRBC # BLD: SIGNIFICANT CHANGE UP /100 WBCS (ref 0–0)
PLATELET # BLD AUTO: 153 K/UL — SIGNIFICANT CHANGE UP (ref 150–400)
POTASSIUM SERPL-MCNC: 2.9 MMOL/L — CRITICAL LOW (ref 3.5–5.3)
POTASSIUM SERPL-SCNC: 2.9 MMOL/L — CRITICAL LOW (ref 3.5–5.3)
PROT SERPL-MCNC: 7.6 G/DL — SIGNIFICANT CHANGE UP (ref 6–8.3)
RBC # BLD: 4.4 M/UL — SIGNIFICANT CHANGE UP (ref 3.8–5.2)
RBC # FLD: 12.7 % — SIGNIFICANT CHANGE UP (ref 10.3–14.5)
SARS-COV-2 RNA SPEC QL NAA+PROBE: SIGNIFICANT CHANGE UP
SODIUM SERPL-SCNC: 135 MMOL/L — SIGNIFICANT CHANGE UP (ref 135–145)
WBC # BLD: 9.6 K/UL — SIGNIFICANT CHANGE UP (ref 3.8–10.5)
WBC # FLD AUTO: 9.6 K/UL — SIGNIFICANT CHANGE UP (ref 3.8–10.5)

## 2020-04-10 PROCEDURE — 70450 CT HEAD/BRAIN W/O DYE: CPT | Mod: 26

## 2020-04-10 PROCEDURE — 99285 EMERGENCY DEPT VISIT HI MDM: CPT

## 2020-04-10 PROCEDURE — 93010 ELECTROCARDIOGRAM REPORT: CPT

## 2020-04-10 PROCEDURE — 71250 CT THORAX DX C-: CPT | Mod: 26

## 2020-04-10 RX ORDER — ACETAMINOPHEN 500 MG
650 TABLET ORAL EVERY 4 HOURS
Refills: 0 | Status: DISCONTINUED | OUTPATIENT
Start: 2020-04-10 | End: 2020-04-14

## 2020-04-10 RX ORDER — POTASSIUM CHLORIDE 20 MEQ
40 PACKET (EA) ORAL ONCE
Refills: 0 | Status: DISCONTINUED | OUTPATIENT
Start: 2020-04-10 | End: 2020-04-10

## 2020-04-10 RX ORDER — ATENOLOL 25 MG/1
100 TABLET ORAL DAILY
Refills: 0 | Status: DISCONTINUED | OUTPATIENT
Start: 2020-04-10 | End: 2020-04-11

## 2020-04-10 RX ORDER — POTASSIUM CHLORIDE 20 MEQ
40 PACKET (EA) ORAL EVERY 4 HOURS
Refills: 0 | Status: COMPLETED | OUTPATIENT
Start: 2020-04-10 | End: 2020-04-10

## 2020-04-10 RX ORDER — POTASSIUM CHLORIDE 20 MEQ
10 PACKET (EA) ORAL
Refills: 0 | Status: COMPLETED | OUTPATIENT
Start: 2020-04-10 | End: 2020-04-10

## 2020-04-10 RX ORDER — LISINOPRIL 2.5 MG/1
1 TABLET ORAL
Qty: 0 | Refills: 0 | DISCHARGE

## 2020-04-10 RX ORDER — ASPIRIN/CALCIUM CARB/MAGNESIUM 324 MG
81 TABLET ORAL DAILY
Refills: 0 | Status: DISCONTINUED | OUTPATIENT
Start: 2020-04-10 | End: 2020-04-11

## 2020-04-10 RX ORDER — MAGNESIUM SULFATE 500 MG/ML
2 VIAL (ML) INJECTION ONCE
Refills: 0 | Status: COMPLETED | OUTPATIENT
Start: 2020-04-10 | End: 2020-04-10

## 2020-04-10 RX ORDER — AMLODIPINE BESYLATE 2.5 MG/1
5 TABLET ORAL DAILY
Refills: 0 | Status: DISCONTINUED | OUTPATIENT
Start: 2020-04-10 | End: 2020-04-11

## 2020-04-10 RX ORDER — POTASSIUM CHLORIDE 20 MEQ
40 PACKET (EA) ORAL ONCE
Refills: 0 | Status: COMPLETED | OUTPATIENT
Start: 2020-04-10 | End: 2020-04-10

## 2020-04-10 RX ORDER — CEFTRIAXONE 500 MG/1
1000 INJECTION, POWDER, FOR SOLUTION INTRAMUSCULAR; INTRAVENOUS EVERY 24 HOURS
Refills: 0 | Status: DISCONTINUED | OUTPATIENT
Start: 2020-04-10 | End: 2020-04-14

## 2020-04-10 RX ORDER — ENOXAPARIN SODIUM 100 MG/ML
40 INJECTION SUBCUTANEOUS DAILY
Refills: 0 | Status: DISCONTINUED | OUTPATIENT
Start: 2020-04-10 | End: 2020-04-10

## 2020-04-10 RX ORDER — CEFTRIAXONE 500 MG/1
1000 INJECTION, POWDER, FOR SOLUTION INTRAMUSCULAR; INTRAVENOUS ONCE
Refills: 0 | Status: COMPLETED | OUTPATIENT
Start: 2020-04-10 | End: 2020-04-10

## 2020-04-10 RX ORDER — ENOXAPARIN SODIUM 100 MG/ML
40 INJECTION SUBCUTANEOUS
Refills: 0 | Status: DISCONTINUED | OUTPATIENT
Start: 2020-04-10 | End: 2020-04-14

## 2020-04-10 RX ORDER — AMLODIPINE BESYLATE 2.5 MG/1
1 TABLET ORAL
Qty: 0 | Refills: 0 | DISCHARGE

## 2020-04-10 RX ORDER — HYDROCHLOROTHIAZIDE 25 MG
25 TABLET ORAL DAILY
Refills: 0 | Status: DISCONTINUED | OUTPATIENT
Start: 2020-04-10 | End: 2020-04-11

## 2020-04-10 RX ORDER — LISINOPRIL 2.5 MG/1
40 TABLET ORAL DAILY
Refills: 0 | Status: DISCONTINUED | OUTPATIENT
Start: 2020-04-10 | End: 2020-04-11

## 2020-04-10 RX ORDER — ATENOLOL 25 MG/1
1 TABLET ORAL
Qty: 0 | Refills: 0 | DISCHARGE

## 2020-04-10 RX ADMIN — Medication 50 GRAM(S): at 23:07

## 2020-04-10 RX ADMIN — Medication 40 MILLIEQUIVALENT(S): at 16:07

## 2020-04-10 RX ADMIN — CEFTRIAXONE 100 MILLIGRAM(S): 500 INJECTION, POWDER, FOR SOLUTION INTRAMUSCULAR; INTRAVENOUS at 02:32

## 2020-04-10 RX ADMIN — AMLODIPINE BESYLATE 5 MILLIGRAM(S): 2.5 TABLET ORAL at 06:24

## 2020-04-10 RX ADMIN — Medication 81 MILLIGRAM(S): at 11:23

## 2020-04-10 RX ADMIN — Medication 650 MILLIGRAM(S): at 18:03

## 2020-04-10 RX ADMIN — Medication 100 MILLIEQUIVALENT(S): at 18:15

## 2020-04-10 RX ADMIN — Medication 40 MILLIEQUIVALENT(S): at 02:31

## 2020-04-10 RX ADMIN — Medication 25 MILLIGRAM(S): at 06:24

## 2020-04-10 RX ADMIN — Medication 100 MILLIEQUIVALENT(S): at 02:32

## 2020-04-10 RX ADMIN — Medication 100 MILLIEQUIVALENT(S): at 05:10

## 2020-04-10 RX ADMIN — Medication 100 MILLIEQUIVALENT(S): at 15:15

## 2020-04-10 RX ADMIN — Medication 40 MILLIEQUIVALENT(S): at 21:57

## 2020-04-10 RX ADMIN — LISINOPRIL 40 MILLIGRAM(S): 2.5 TABLET ORAL at 06:24

## 2020-04-10 RX ADMIN — Medication 100 MILLIEQUIVALENT(S): at 02:31

## 2020-04-10 RX ADMIN — ENOXAPARIN SODIUM 40 MILLIGRAM(S): 100 INJECTION SUBCUTANEOUS at 06:24

## 2020-04-10 RX ADMIN — ENOXAPARIN SODIUM 40 MILLIGRAM(S): 100 INJECTION SUBCUTANEOUS at 17:40

## 2020-04-10 RX ADMIN — Medication 100 MILLIEQUIVALENT(S): at 20:20

## 2020-04-10 NOTE — H&P ADULT - PROBLEM SELECTOR PLAN 2
-presented with AMS, CT chest with minimal bibasilar dependent atelectasis    - supplemental O2 prn to maintain O2 sats >93%, currently doing well on RA   - will avoid HFNC, bipap, cpap to avoid aerosolization. will avoid nebulized solutions  - will limit NSAID use  - monitor volume status closely, will exercise caution with aggressive hydration  - tylenol prn myalgias, fever  - check QTc:   - daily labs to include: CBC with differential along with a CMP  - ferritin, crp, d-dimer, procal, ferritin at admission then will repeat as clinically warranted  - will initiate VTE ppx unless there is absolute contraindication  - advanced care planning/code status:   - NEWS score: 3 due to AMS -presented with AMS, CT chest with minimal bibasilar dependent atelectasis    - supplemental O2 prn to maintain O2 sats >93%, currently doing well on RA   - will avoid HFNC, bipap, cpap to avoid aerosolization. will avoid nebulized solutions  - will limit NSAID use  - monitor volume status closely, will exercise caution with aggressive hydration  - tylenol prn myalgias, fever  - check QTc: 558  - daily labs to include: CBC with differential along with a CMP  - ferritin, crp, d-dimer, procal, ferritin at admission then will repeat as clinically warranted  - will initiate VTE ppx unless there is absolute contraindication  - advanced care planning/code status: ?full code for now, patient's son who is HCP will look for MOLST form tomorrow and bring it in  - NEWS score: 3 due to AMS

## 2020-04-10 NOTE — H&P ADULT - PROBLEM SELECTOR PLAN 1
likely 2/2 UTI vs. CVA vs. COVID-19  -s/p rocephin x1 in ED, continue  -f/u COVID-19 PCR  -neuro Dr. Cooper consulted likely 2/2 UTI vs. CVA vs. COVID-19  -s/p rocephin x1 in ED, continue  -f/u COVID-19 PCR  -neuro Dr. Cooper consulted  -continue ASA 81 mg daily likely 2/2 UTI vs. CVA vs. COVID-19  -s/p rocephin x1 in ED, continue  -f/u COVID-19 PCR  -neuro Dr. Cooper consulted  -continue ASA 81 mg daily  -PT eval  -fall precautions  -speech and swallow consult, NPO for now AMS   -TIA R/O  CVA vs. COVID-19, R/O UTI vs.  -s/p Rocephin x1 in ED, continue  -f/u COVID-19 PCR  -neuro Dr. Cooper consulted  -continue ASA 81 mg daily  -PT eval  -fall precautions  -speech and swallow consult, NPO for now

## 2020-04-10 NOTE — DISCHARGE NOTE PROVIDER - PROVIDER TOKENS
PROVIDER:[TOKEN:[[id number]]] PROVIDER:[TOKEN:[[id number]]],PROVIDER:[TOKEN:[[id number]]] PROVIDER:[[id number]],FOLLOWUP:[1-3 days]],PROVIDER:[TOKEN:[[id number]],FOLLOWUP:[Routine]] PROVIDER:[TOKEN:[[id number]],FOLLOWUP:[1-3 days]],PROVIDER:[TOKEN:[[id number]],FOLLOWUP:[1 week]] PROVIDER:[TOKEN:[[id number]],FOLLOWUP:[1 week]],PROVIDER:[TOKEN:[[id number]],FOLLOWUP:[1 week]],PROVIDER:[[id number]],FOLLOWUP:[Routine]] PROVIDER:[[id number]:[1 week]],PROVIDER:[[id number]],FOLLOWUP:[1 week]],PROVIDER:[[id number]],FOLLOWUP:[Routine]],PROVIDER:[TOKEN:[[id number]]]

## 2020-04-10 NOTE — H&P ADULT - HISTORY OF PRESENT ILLNESS
ED vitals: T: 99.1 HR: 70, BP: 167/68 RR: 18 O2 Saturation: 98 on RA   Labs: k: 2.7, UA   CT Head: No acute intracranial bleeding, mass effect, or shift. Mild chronic microvascular ischemic changes.   CT chest: Clear Lungs apart from minimal bibasilar dependent atelectasis.  In the ED patient received: 1000ml IVF bolus X1,Potassium 40X1 and Potassium 10 IV X3, IVF 1000 X1, Ceftriaxone X1   UA: Trace Leuk esterases, moderate bacteria, 85 yo female with PMHx of HTN and  sp lumpectomy in past and receives herceptin infusions q3 week, last 2 weeks ago. pt has been fine until today. pt last had normal conversation at 4:30 and tonight pt confused.  time of onset unknown. no recent trauma, f/c, n/v or any other complaints.  pt brought to er        ED vitals: T: 99.1 HR: 70, BP: 167/68 RR: 18 O2 Saturation: 98 on RA   Labs: k: 2.7, UA   CT Head: No acute intracranial bleeding, mass effect, or shift. Mild chronic microvascular ischemic changes.   CT chest: Clear Lungs apart from minimal bibasilar dependent atelectasis.  In the ED patient received: 1000ml IVF bolus X1,Potassium 40X1 and Potassium 10 IV X3, IVF 1000 X1, Ceftriaxone X1   UA: Trace Leuk esterases, moderate bacteria, 85 yo female with PMHx of HTN, breast ca (s/p L lumpectomy 2018, currently on herceptin infusions q3 week, last 2 weeks ago) p/w AMS that started suddenly today. Patient is confused so daughter-in-law and son were called. Daughter-in-law states that around 7PM earlier today, when she was dropping off some food, when her mother-in-law answered the door she was noticeably disoriented and did not recognize her daughter-in-law. She complains of feeling lightheaded and dizzy, and repeatedly stated "how did you know I was here?" Last normal conversation was at 4:30PM. No recent trauma. Patient was complaining of not feeling well last Friday but attributed it to her chemotherapy. According to daughter, patient was complaining of very vague symptomatology, feeling lightheaded, dizzy with headaches, and nausea and diarrhea. Of note, last echo 3 months ago was normal, and patient has been wearing a pessary for the past few years, complaining of occasional discomfort but no acute changes recently.      ED vitals: T: 99.1 HR: 70, BP: 167/68 RR: 18 O2 Saturation: 98 on RA   Labs: k: 2.7, UA dirty  CT Head: No acute intracranial bleeding, mass effect, or shift. Mild chronic microvascular ischemic changes.   CT chest: Clear Lungs apart from minimal bibasilar dependent atelectasis.  In the ED patient received: 1000ml IVF bolus X1,Potassium 40X1 and Potassium 10 IV X3, IVF 1000 X1, Ceftriaxone X1   UA: Trace Leuk esterases, moderate bacteria,    EKG showing NSR with L axis deviation 85 yo female with PMHx of HTN, breast ca (s/p L lumpectomy 2018, currently on herceptin infusions q3 week, last 2 weeks ago) p/w AMS that started suddenly today. Patient is confused so daughter-in-law and son were called. Daughter-in-law states that around 7PM earlier today, when she was dropping off some food, when her mother-in-law answered the door she was noticeably disoriented and did not recognize her daughter-in-law.  Her speech was gibberish, pt was repeating same things, She complains of feeling lightheaded and dizzy, and repeatedly stated "how did you know I was here?" Last normal conversation was at 4:30PM. No recent trauma. Patient was complaining of not feeling well last Friday but attributed it to her chemotherapy. According to daughter, patient was complaining of very vague symptomatology, feeling lightheaded, dizzy with headaches, and nausea and diarrhea. Of note, last echo 3 months ago was normal, and patient has been wearing a pessary for the past few years, complaining of occasional discomfort but no acute changes recently.      ED vitals: T: 99.1 HR: 70, BP: 167/68 RR: 18 O2 Saturation: 98 on RA   Labs: k: 2.7, UA dirty  CT Head: No acute intracranial bleeding, mass effect, or shift. Mild chronic microvascular ischemic changes.   CT chest: Clear Lungs apart from minimal bibasilar dependent atelectasis.  In the ED patient received: 1000ml IVF bolus X1,Potassium 40X1 and Potassium 10 IV X3, IVF 1000 X1, Ceftriaxone X1   UA: Trace Leuk esterases, moderate bacteria,    EKG showing NSR with L axis deviation

## 2020-04-10 NOTE — PROGRESS NOTE ADULT - PROBLEM SELECTOR PLAN 1
likely 2/2 UTI vs. CVA  -COVID NEGATIVE  -continue rocephin for UTI  -follow up MRI head as per neuro  -lipid, TSH, HbA1C  -continue ASA 81 mg daily  -PT eval  -fall precautions  -speech and swallow consult, NPO for now  -neuro Dr. Cooper consulted likely 2/2 UTI vs. CVA  -COVID NEGATIVE  -continue rocephin for UTI  -follow up MRI head as per neuro  -lipid, TSH, HbA1C  -continue ASA 81 mg daily  -PT eval  -fall precautions  -speech and swallow consult, NPO for now --> DASH diet  -neuro Dr. Cooper consulted likely TIA R/O  CVA,   -COVID NEGATIVE  -On  rocephin for  r/o UTI-Doubt UTI   -follow up MRI head as per neuro  -lipid, TSH, HbA1C  -continue ASA 81 mg daily  -PT eval,  -fall precautions  -speech and swallow consult, NPO for now --> DASH diet  -neuro Dr. Cooper consulted

## 2020-04-10 NOTE — DISCHARGE NOTE PROVIDER - NSDCCPCAREPLAN_GEN_ALL_CORE_FT
PRINCIPAL DISCHARGE DIAGNOSIS  Diagnosis: Altered mental status, unspecified  Assessment and Plan of Treatment: You were admitted for altered mental status.    -MRI showed ______________  -COVID test was negative  -confusion was likely due to UTI. You were treated with IV antibiotics and transitioned to ______      SECONDARY DISCHARGE DIAGNOSES  Diagnosis: UTI (urinary tract infection)  Assessment and Plan of Treatment: You were treated with IV antibiotics and transitioned to _________  -Please follow up with outpatient provider within 1-2 weeks of discharge    Diagnosis: Hypokalemia  Assessment and Plan of Treatment: You were noted to have very low potassium levels and were given IV and oral potassium supplements.  Follow up with outpatient provider for repeat testing.    Diagnosis: HTN (hypertension)  Assessment and Plan of Treatment: Continue home meds PRINCIPAL DISCHARGE DIAGNOSIS  Diagnosis: CVA (cerebral vascular accident)  Assessment and Plan of Treatment: You were admitted for altered mental status which was likely due to acute stroke.    -MRI showed multiple tiny strokes in the back part of the brain  -CTA neck showed no stenosis of carotid arteries  -Echo was mostly normal (EF 60%)  -HbA1C 6.4, TSH 1.16, Lipid panel mostly normal ()  -START atorvastain 10mg at bedtime  -continue aspirin 81mg daily  -COVID test was negative  -follow up with PCP and cardio outpatient      SECONDARY DISCHARGE DIAGNOSES  Diagnosis: UTI (urinary tract infection)  Assessment and Plan of Treatment: You were treated with IV antibiotics and transitioned to _________  -Please follow up with outpatient provider within 1-2 weeks of discharge    Diagnosis: Hypokalemia  Assessment and Plan of Treatment: You were noted to have very low potassium levels and were given IV and oral potassium supplements.  Follow up with outpatient provider for repeat testing.    Diagnosis: HTN (hypertension)  Assessment and Plan of Treatment: Continue home meds PRINCIPAL DISCHARGE DIAGNOSIS  Diagnosis: CVA (cerebral vascular accident)  Assessment and Plan of Treatment: You were admitted for altered mental status which was likely due to acute stroke.    -MRI showed multiple tiny strokes in the back part of the brain  -CTA neck showed no stenosis of carotid arteries  -Echo was mostly normal (EF 60%)  -HbA1C 6.4, TSH 1.16, Lipid panel mostly normal ()  -START atorvastain 10mg at bedtime  -STOP aspirin and START PLAVIX 75mg daily  -COVID test was negative x2  -follow up with PCP and cardio outpatient      SECONDARY DISCHARGE DIAGNOSES  Diagnosis: UTI (urinary tract infection)  Assessment and Plan of Treatment: You were treated with IV antibiotics and transitioned to _________  -Please follow up with outpatient provider within 1-2 weeks of discharge    Diagnosis: Hypokalemia  Assessment and Plan of Treatment: You were noted to have very low potassium levels and were given IV and oral potassium supplements.  Follow up with outpatient provider for repeat testing.    Diagnosis: HTN (hypertension)  Assessment and Plan of Treatment: Continue home meds PRINCIPAL DISCHARGE DIAGNOSIS  Diagnosis: CVA (cerebral vascular accident)  Assessment and Plan of Treatment: Altered mental status due to acute stroke.   -MRI brain showed multiple tiny strokes in posterior distribution.  -CTA neck showed no stenosis of carotid arteries.  -COVID test was negative twice.  -START Atorvastain 10 mg at bedtime.  -STOP Aspirin and START PLAVIX 75mg daily.  -Follow up with primary care Dr. Astorga for further management.      SECONDARY DISCHARGE DIAGNOSES  Diagnosis: UTI (urinary tract infection)  Assessment and Plan of Treatment: UTI treated with IV Ceftriaxone.  -Please follow up with outpatient provider within 1-2 weeks of discharge    Diagnosis: Hypokalemia  Assessment and Plan of Treatment: Very low potassium levels, supplemented with IV and oral potassium.  -Follow up with primary care Dr. Astorga for repeat labwork.    Diagnosis: Prediabetes  Assessment and Plan of Treatment: HbA1c 6.4%, prediabetic range.  -Follow up with primary care Dr. Astorga for further management and monitoring.    Diagnosis: HTN (hypertension)  Assessment and Plan of Treatment: Continue Amlodipine 10 mg daily, Atenolol 100 mg daily, HCTZ 25 mg daily and Lisinopril 40 mg daily. PRINCIPAL DISCHARGE DIAGNOSIS  Diagnosis: CVA (cerebral vascular accident)  Assessment and Plan of Treatment: Altered mental status due to acute stroke, resolved.  -MRI brain showed multiple tiny strokes in posterior distribution.  -COVID test was negative twice.  -START Atorvastain 10 mg at bedtime.  -STOP Aspirin and START PLAVIX 75mg daily.  -Follow up with primary care physician Dr. Astorga and cardiologist Dr. Tong in 1 week of discharge for further management.      SECONDARY DISCHARGE DIAGNOSES  Diagnosis: UTI (urinary tract infection)  Assessment and Plan of Treatment: UTI treated with IV Ceftriaxone    Diagnosis: Hypokalemia  Assessment and Plan of Treatment: Very low potassium levels, supplemented with IV and oral potassium.  - Continue your potassium (KCl) 10 mEq supplement daily.  - Follow up with primary care physician Dr. Astorga for repeat labwork (BMP) in 1 week of discharge.    Diagnosis: HTN (hypertension)  Assessment and Plan of Treatment: Continue Amlodipine 10 mg daily, Atenolol 100 mg daily, HCTZ 25 mg daily and Lisinopril 40 mg daily.    Diagnosis: Prediabetes  Assessment and Plan of Treatment: HbA1c 6.4%, prediabetic range.  -Follow up with primary care physician Dr. Astorga for further management and monitoring. PRINCIPAL DISCHARGE DIAGNOSIS  Diagnosis: CVA (cerebral vascular accident)  Assessment and Plan of Treatment: Altered mental status due to acute stroke, resolved.  -MRI brain showed multiple tiny strokes in posterior distribution.  -COVID test was negative twice.  -STOP Aspirin. START PLAVIX 75mg daily and Eliquis 5mg twice a day (12 hours apart).  -START Atorvastain 10 mg at bedtime.  -Follow up with cardiologist Dr. oTng within 1 week of discharge regarding Loop recorder and echocardiogram (MOSES).      SECONDARY DISCHARGE DIAGNOSES  Diagnosis: UTI (urinary tract infection)  Assessment and Plan of Treatment: UTI treated with IV Ceftriaxone    Diagnosis: Hypokalemia  Assessment and Plan of Treatment: Very low potassium levels, supplemented with IV and oral potassium.  - Continue your potassium (KCl) 10 mEq supplement daily.  - Follow up with primary care physician Dr. Astorga for repeat labwork (BMP) in 1 week of discharge.    Diagnosis: HTN (hypertension)  Assessment and Plan of Treatment: Continue Amlodipine 10 mg daily, Atenolol 100 mg daily, HCTZ 25 mg daily and Lisinopril 40 mg daily.    Diagnosis: Prediabetes  Assessment and Plan of Treatment: HbA1c 6.4%, prediabetic range.  -Follow up with primary care physician Dr. Astorga for further management and monitoring.    Diagnosis: Breast cancer  Assessment and Plan of Treatment: Follow up with oncologist Dr Esparza for Herceptin treatment. PRINCIPAL DISCHARGE DIAGNOSIS  Diagnosis: CVA (cerebral vascular accident)  Assessment and Plan of Treatment: Altered mental status due to acute stroke, resolved.  -MRI brain showed multiple tiny strokes in posterior distribution.  -COVID test was negative twice.  -STOP Aspirin. START PLAVIX 75mg daily and Eliquis 5mg twice a day,(12 hours apart). , this is a new medicine which is a blood thinner .  -START Atorvastain 10 mg at bedtime.  -Follow up with cardiologist Dr. Tong within 1 week of discharge regarding Loop recorder and echocardiogram (MOSES).  Neurology -DR moscoso as out pt follow up      SECONDARY DISCHARGE DIAGNOSES  Diagnosis: Breast cancer  Assessment and Plan of Treatment: Follow up with oncologist Dr Esparza for Herceptin treatment as out pt.    Diagnosis: Prediabetes  Assessment and Plan of Treatment: HbA1c 6.4%, prediabetic range. Diabetic diet   -Follow up with primary care physician Dr. Astorga for further management and monitoring.    Diagnosis: HTN (hypertension)  Assessment and Plan of Treatment: Continue Amlodipine 10 mg daily, Atenolol 100 mg daily, HCTZ 25 mg daily and Lisinopril 40 mg daily.    Diagnosis: Hypokalemia  Assessment and Plan of Treatment: Very low potassium levels, supplemented with IV and oral potassium.  - Continue your potassium (KCl) 10 mEq  2 tab  daily with Water pill.  - Follow up with primary care physician Dr. Astorga for repeat labwork (BMP) in 1 week of discharge.    Diagnosis: UTI (urinary tract infection)  Assessment and Plan of Treatment: No UTI, urine culture -neg

## 2020-04-10 NOTE — DISCHARGE NOTE PROVIDER - NSDCFUADDINST_GEN_ALL_CORE_FT
Follow up with your primary care physician Dr. Astorga and your cardiologist Dr. Tong in 1 week of discharge for further management.   Have repeat labwork (BMP) checked with Dr. Callahan within 1 week. Follow up with your primary care physician Dr. Astorga and your cardiologist Dr. Tong in 1 week of discharge.   - Discuss loop recorder and echocardiogram (MOSES) with Dr. Tong.   - Have repeat labwork (BMP) checked with Dr. Callahan within 1 week. Follow up with your primary care physician Dr. Astorga and your cardiologist Dr. Tong in 1 week of discharge.   - Out patient  loop recorder and echocardiogram (MOSES) with Dr. Tong.   - Have repeat labwork (BMP) checked with Dr. Callahan within 1 week.

## 2020-04-10 NOTE — DISCHARGE NOTE PROVIDER - NSDCFUSCHEDAPPT_GEN_ALL_CORE_FT
PREET CHUA ; 05/11/2020 ; [facility] PREET CHUA ; 05/11/2020 ; [facility] PRE[facility] ; 05/11/2020 ; [facility] PRE[facility] ; 05/11/2020 ; [facility] ; 05/11/2020 ; [facility] PRE[facility] ; 05/11/2020 ; [facility] ; 05/11/2020 ; [facility] ; 05/11/2020 ; [facility] ; 05/11/2020 ; [facility] ; 05/11/2020 ; [facility]

## 2020-04-10 NOTE — H&P ADULT - PROBLEM SELECTOR PLAN 5
had extensive discussion with daughter, daughter-in-law, and son  -son and daughter are HCP, MOLST form is in patient's home, son states that he will find the MOLST form tomorrow (unsure whether or not patient is DNR/DNI) H/O Breast ca 2018  S/P Left breast Lumpectomy s/p RT   -On Herceptin Rx q 3 weeks with Dr JOSIAS Esparza

## 2020-04-10 NOTE — PROGRESS NOTE ADULT - PROBLEM SELECTOR PLAN 4
patient is on multiple home meds, according to daughter-in-law, patient was not taking potassium chloride tabs every day, last prescribed on 11/6/19 for a 3 month supply  -on amlodipine, atenolol, HCTZ, lisinopril at home, continue with hold parameters S/P left Breast Lumpectomy. S/P RT   Now on Herceptin Rx q 3 weekly with Dr Esparza 10/12 Rx completed as per Dtr in Law

## 2020-04-10 NOTE — PHYSICAL THERAPY INITIAL EVALUATION ADULT - PERTINENT HX OF CURRENT PROBLEM, REHAB EVAL
85 yo female with PMHx of HTN, breast ca (s/p L lumpectomy 2018, currently on herceptin infusions q3 week, last 2 weeks ago) p/w AMS that started suddenly today.

## 2020-04-10 NOTE — H&P ADULT - GASTROINTESTINAL DETAILS
normal/nontender/soft/no masses palpable/no rebound tenderness/no rigidity/no organomegaly/no guarding/no distention/bowel sounds normal/no bruit/Obese

## 2020-04-10 NOTE — H&P ADULT - NSHPPHYSICALEXAM_GEN_ALL_CORE
T(C): 37.3 (04-10-20 @ 00:34), Max: 37.3 (04-10-20 @ 00:34)  HR: 70 (04-10-20 @ 00:34) (65 - 70)  BP: 167/68 (04-10-20 @ 00:34) (167/68 - 187/84)  RR: 18 (04-10-20 @ 00:34) (16 - 18)  SpO2: 98% (04-10-20 @ 00:34) (97% - 98%)    GENERAL: patient appears well, no acute distress, appropriate, pleasant  EYES: sclera clear, no exudates  LUNGS: good air entry bilaterally, clear to auscultation, symmetric breath sounds, no wheezing or rhonchi appreciated  HEART: soft S1/S2, regular rate and rhythm, no murmurs noted, no lower extremity edema  GASTROINTESTINAL: abdomen is soft, nontender, nondistended  INTEGUMENT: good skin turgor  MUSCULOSKELETAL: no clubbing or cyanosis, no obvious deformity  NEUROLOGIC: unable to assess, patient does not follow commands or answer questions appropriately.

## 2020-04-10 NOTE — ED ADULT NURSE REASSESSMENT NOTE - NS ED NURSE REASSESS COMMENT FT1
No change in gen status.  Ambulated with assist to commode.  VSS.  Remains confused yet easily re-oriented and following commands

## 2020-04-10 NOTE — PROGRESS NOTE ADULT - PROBLEM SELECTOR PLAN 7
Lovenox 40 mg BID   PT eval    IMPROVE VTE Individual Risk Assessment  RISK                                                                Points  [  ] Previous VTE                                                  3  [  ] Thrombophilia                                               2  [  ] Lower limb paralysis                                      2        (unable to hold up >15 seconds)    [ x ] Current Cancer                                              2         (within 6 months)  [  ] Immobilization > 24 hrs                                1  [  ] ICU/CCU stay > 24 hours                              1  [ x ] Age > 60                                                      1  IMPROVE VTE Score: 3    IMPROVE Score 0-1: Low Risk, No VTE prophylaxis required for most patients, encourage ambulation.   IMPROVE Score 2-3: At risk, pharmacologic VTE prophylaxis is indicated for most patients (in the absence of a contraindication)  IMPROVE Score > or = 4: High Risk, pharmacologic VTE prophylaxis is indicated for most patients (in the absence of a contraindication)

## 2020-04-10 NOTE — PROGRESS NOTE ADULT - PROBLEM SELECTOR PLAN 6
Lovenox 40 mg BID   PT eval    IMPROVE VTE Individual Risk Assessment  RISK                                                                Points  [  ] Previous VTE                                                  3  [  ] Thrombophilia                                               2  [  ] Lower limb paralysis                                      2        (unable to hold up >15 seconds)    [ x ] Current Cancer                                              2         (within 6 months)  [  ] Immobilization > 24 hrs                                1  [  ] ICU/CCU stay > 24 hours                              1  [ x ] Age > 60                                                      1  IMPROVE VTE Score: 3    IMPROVE Score 0-1: Low Risk, No VTE prophylaxis required for most patients, encourage ambulation.   IMPROVE Score 2-3: At risk, pharmacologic VTE prophylaxis is indicated for most patients (in the absence of a contraindication)  IMPROVE Score > or = 4: High Risk, pharmacologic VTE prophylaxis is indicated for most patients (in the absence of a contraindication) had extensive discussion with daughter, daughter-in-law, and son  -son and daughter are HCP, MOLST form is in patient's home, son states that he will find the MOLST form (unsure whether or not patient is DNR/DNI)

## 2020-04-10 NOTE — H&P ADULT - COMMENTS
as per family-pt 's speech was Gibberish, pt did Not make any sense when she was talking ,not answering question appropriately

## 2020-04-10 NOTE — PHYSICAL THERAPY INITIAL EVALUATION ADULT - ADDITIONAL COMMENTS
Patient lives in private home alone.  Patient was independent in all ADLs and independent in ambulation without a device

## 2020-04-10 NOTE — H&P ADULT - ATTENDING COMMENTS
Pt seen, examined, case & care plan d/w pt, residents at detail.  D/W Son & Dtr in Law at detail.  D/W Dr Cooper - MRI Brain    .

## 2020-04-10 NOTE — PROGRESS NOTE ADULT - PROBLEM SELECTOR PLAN 5
had extensive discussion with daughter, daughter-in-law, and son  -son and daughter are HCP, MOLST form is in patient's home, son states that he will find the MOLST form (unsure whether or not patient is DNR/DNI) NO COVID -19 infection   --presented with AMS, CT chest with minimal bibasilar dependent atelectasis    -saturating well on RA, COVID NEGATIVE

## 2020-04-10 NOTE — PROGRESS NOTE ADULT - ATTENDING COMMENTS
Pt seen, Examined, case & care plan d/w pt, residents & son at detail.  PT---> NO need  D/C Plan after MRI Brain.  Will D/C IV Abx

## 2020-04-10 NOTE — DISCHARGE NOTE PROVIDER - HOSPITAL COURSE
FROM ADMISSION H+P:     HPI:    85 yo female with PMHx of HTN, breast ca (s/p L lumpectomy 2018, currently on herceptin infusions q3 week, last 2 weeks ago) p/w AMS that started suddenly today. Patient is confused so daughter-in-law and son were called. Daughter-in-law states that around 7PM earlier today, when she was dropping off some food, when her mother-in-law answered the door she was noticeably disoriented and did not recognize her daughter-in-law. She complains of feeling lightheaded and dizzy, and repeatedly stated "how did you know I was here?" Last normal conversation was at 4:30PM. No recent trauma. Patient was complaining of not feeling well last Friday but attributed it to her chemotherapy. According to daughter, patient was complaining of very vague symptomatology, feeling lightheaded, dizzy with headaches, and nausea and diarrhea. Of note, last echo 3 months ago was normal, and patient has been wearing a pessary for the past few years, complaining of occasional discomfort but no acute changes recently.            ED vitals: T: 99.1 HR: 70, BP: 167/68 RR: 18 O2 Saturation: 98 on RA     Labs: k: 2.7, UA dirty    CT Head: No acute intracranial bleeding, mass effect, or shift. Mild chronic microvascular ischemic changes.     CT chest: Clear Lungs apart from minimal bibasilar dependent atelectasis.    In the ED patient received: 1000ml IVF bolus X1,Potassium 40X1 and Potassium 10 IV X3, IVF 1000 X1, Ceftriaxone X1     UA: Trace Leuk esterases, moderate bacteria,      EKG showing NSR with L axis deviation (10 Apr 2020 02:27)            ---    HOSPITAL COURSE:         Patient was admitted for AMS likely metabolic encephalopathy 2/2 UTI vs r/o CVA vs COVID-19.  COVID testing was negative and patient was transferred to non-COVID unit.  Regarding UTI, patient was started on rocephin and switched to ________.  Patient noted to have very low potassium levels and it was repleted with both oral and IV supplementation.  Neuro, Dr. Cooper, was consulted for AMS and MRI was performed which showed _________.  Speech and swallow consulted as well and recommended regular diet.  PT recommended ______.        ---    CONSULTANTS:         Neuro- Dr. Cooper FROM ADMISSION H+P:     HPI:    83 yo female with PMHx of HTN, breast ca (s/p L lumpectomy 2018, currently on herceptin infusions q3 week, last 2 weeks ago) p/w AMS that started suddenly today. Patient is confused so daughter-in-law and son were called. Daughter-in-law states that around 7PM earlier today, when she was dropping off some food, when her mother-in-law answered the door she was noticeably disoriented and did not recognize her daughter-in-law. She complains of feeling lightheaded and dizzy, and repeatedly stated "how did you know I was here?" Last normal conversation was at 4:30PM. No recent trauma. Patient was complaining of not feeling well last Friday but attributed it to her chemotherapy. According to daughter, patient was complaining of very vague symptomatology, feeling lightheaded, dizzy with headaches, and nausea and diarrhea. Of note, last echo 3 months ago was normal, and patient has been wearing a pessary for the past few years, complaining of occasional discomfort but no acute changes recently.            ED vitals: T: 99.1 HR: 70, BP: 167/68 RR: 18 O2 Saturation: 98 on RA     Labs: k: 2.7, UA dirty    CT Head: No acute intracranial bleeding, mass effect, or shift. Mild chronic microvascular ischemic changes.     CT chest: Clear Lungs apart from minimal bibasilar dependent atelectasis.    In the ED patient received: 1000ml IVF bolus X1,Potassium 40X1 and Potassium 10 IV X3, IVF 1000 X1, Ceftriaxone X1     UA: Trace Leuk esterases, moderate bacteria,      EKG showing NSR with L axis deviation (10 Apr 2020 02:27)            ---    HOSPITAL COURSE:         Patient was admitted for AMS likely metabolic encephalopathy 2/2 UTI vs r/o CVA vs COVID-19.  COVID testing was negative and patient was transferred to non-COVID unit. However patient developed fever of 101.3F on 4/10 and ID, Dr. Nair, was consulted and repeat COVID testing was performed which was ________. Regarding UTI, patient was started on rocephin and switched to ________.  Patient noted to have very low potassium levels and it was repleted with both oral and IV supplementation.  Neuro, Dr. Cooper, was consulted for AMS and MRI was performed which showed multiple acute tiny infarcts in posterior distribution.  Speech and swallow consulted as well and recommended regular diet.  CTA neck showed no carotid disease.  Cardio, [facility], consulted, and echo was performed which showed LVEF 60% with trace MR and TR.  Patient was started on atorvastatin 10mg and permissive HTN allowed for 48 hours.  HbA1C 6.4; TSH 1.16 and lipid wnl (). PT recommended no PT needs.        ---    CONSULTANTS:     Neuro- Dr. Cooper    ID- Dr. Nair    Cardio- Dr. Fink FROM ADMISSION H+P:     HPI:    83 yo female with PMHx of HTN, breast ca (s/p L lumpectomy 2018, currently on herceptin infusions q3 week, last 2 weeks ago) p/w AMS that started suddenly today. Patient is confused so daughter-in-law and son were called. Daughter-in-law states that around 7PM earlier today, when she was dropping off some food, when her mother-in-law answered the door she was noticeably disoriented and did not recognize her daughter-in-law. She complains of feeling lightheaded and dizzy, and repeatedly stated "how did you know I was here?" Last normal conversation was at 4:30PM. No recent trauma. Patient was complaining of not feeling well last Friday but attributed it to her chemotherapy. According to daughter, patient was complaining of very vague symptomatology, feeling lightheaded, dizzy with headaches, and nausea and diarrhea. Of note, last echo 3 months ago was normal, and patient has been wearing a pessary for the past few years, complaining of occasional discomfort but no acute changes recently.            ED vitals: T: 99.1 HR: 70, BP: 167/68 RR: 18 O2 Saturation: 98 on RA     Labs: k: 2.7, UA dirty    CT Head: No acute intracranial bleeding, mass effect, or shift. Mild chronic microvascular ischemic changes.     CT chest: Clear Lungs apart from minimal bibasilar dependent atelectasis.    In the ED patient received: 1000ml IVF bolus X1,Potassium 40X1 and Potassium 10 IV X3, IVF 1000 X1, Ceftriaxone X1     UA: Trace Leuk esterases, moderate bacteria,      EKG showing NSR with L axis deviation (10 Apr 2020 02:27)            ---    HOSPITAL COURSE:         Patient was admitted for AMS likely metabolic encephalopathy 2/2 UTI vs r/o CVA vs COVID-19.  COVID testing was negative and patient was transferred to non-COVID unit. However patient developed fever of 101.3F on 4/10 and ID, Dr. Nair, was consulted and repeat COVID testing was performed which was negative. Regarding UTI, patient was started on rocephin and switched to ________.  Patient noted to have very low potassium levels and it was repleted with both oral and IV supplementation.  Neuro, Dr. Cooper, was consulted for AMS and MRI was performed which showed multiple acute tiny infarcts in posterior distribution.  Speech and swallow consulted as well and recommended regular diet.  CTA neck showed no carotid disease.  Cardio, [facility], consulted, and echo was performed which showed LVEF 60% with trace MR and TR.  Patient was started on atorvastatin 10mg and permissive HTN allowed for 48 hours.  Because patient was already on asa 81mg and failed therapy, was switched to plavix.  HbA1C 6.4; TSH 1.16 and lipid wnl (). PT recommended no PT needs.        ---    CONSULTANTS:     Neuro- Dr. Cooper    ID- Dr. Nair    Cardio- Dr. Fink HPI:    85 yo female with PMHx of HTN, breast ca (s/p L lumpectomy 2018, currently on herceptin infusions q3 week, last 2 weeks ago) p/w AMS that started suddenly today. Patient is confused so daughter-in-law and son were called. Daughter-in-law states that around 7PM earlier today, when she was dropping off some food, when her mother-in-law answered the door she was noticeably disoriented and did not recognize her daughter-in-law. She complains of feeling lightheaded and dizzy, and repeatedly stated "how did you know I was here?" Last normal conversation was at 4:30PM. No recent trauma. Patient was complaining of not feeling well last Friday but attributed it to her chemotherapy. According to daughter, patient was complaining of very vague symptomatology, feeling lightheaded, dizzy with headaches, and nausea and diarrhea. Of note, last echo 3 months ago was normal, and patient has been wearing a pessary for the past few years, complaining of occasional discomfort but no acute changes recently.        ED vitals: T: 99.1 HR: 70, BP: 167/68 RR: 18 O2 Saturation: 98 on RA     Labs: k: 2.7, UA dirty    CT Head: No acute intracranial bleeding, mass effect, or shift. Mild chronic microvascular ischemic changes.     CT chest: Clear Lungs apart from minimal bibasilar dependent atelectasis.    In the ED patient received: 1000ml IVF bolus X1,Potassium 40X1 and Potassium 10 IV X3, IVF 1000 X1, Ceftriaxone X1     UA: Trace Leuk esterases, moderate bacteria,      EKG showing NSR with L axis deviation (10 Apr 2020 02:27)            ---    HOSPITAL COURSE:     Patient was admitted for AMS likely metabolic encephalopathy 2/2 UTI vs r/o CVA vs COVID-19.  COVID testing was negative and patient was transferred to non-COVID unit.  However patient developed fever of 101.3F on 4/10 and ID, Dr. Nair, was consulted and repeat COVID testing was performed which was negative. Regarding possible UTI, patient was treated with Rocephin 4/11.  Patient noted to have very low potassium levels and it was repleted with both oral and IV supplementation.  Neuro, Dr. Cooper, was consulted for AMS and MRI was performed which showed multiple acute tiny infarcts in posterior distribution.  Speech and swallow consulted as well and recommended regular diet.  CTA neck showed no carotid disease.  Cardio, [facility], consulted, and echo was performed which showed LVEF 60% with trace MR and TR.  Patient was started on atorvastatin 10mg and permissive HTN allowed for 48 hours.  Because patient was already on asa 81mg and failed therapy, was switched to plavix.  HbA1C 6.4; TSH 1.16 and lipid wnl (). PT recommended no PT needs.        ---    CONSULTANTS:     Neuro- Dr. Cooper    ID- Dr. Nair    Cardio- Dr. Fink HPI:    83 yo female with PMHx of HTN, breast ca (s/p L lumpectomy 2018, currently on herceptin infusions q3 week, last 2 weeks ago) p/w AMS that started suddenly today. Patient is confused so daughter-in-law and son were called. Daughter-in-law states that around 7PM earlier today, when she was dropping off some food, when her mother-in-law answered the door she was noticeably disoriented and did not recognize her daughter-in-law. She complains of feeling lightheaded and dizzy, and repeatedly stated "how did you know I was here?" Last normal conversation was at 4:30PM. No recent trauma. Patient was complaining of not feeling well last Friday but attributed it to her chemotherapy. According to daughter, patient was complaining of very vague symptomatology, feeling lightheaded, dizzy with headaches, and nausea and diarrhea. Of note, last echo 3 months ago was normal, and patient has been wearing a pessary for the past few years, complaining of occasional discomfort but no acute changes recently.        ED vitals: T: 99.1 HR: 70, BP: 167/68 RR: 18 O2 Saturation: 98 on RA     Labs: k: 2.7, UA dirty    CT Head: No acute intracranial bleeding, mass effect, or shift. Mild chronic microvascular ischemic changes.     CT chest: Clear Lungs apart from minimal bibasilar dependent atelectasis.    In the ED patient received: 1000ml IVF bolus X1,Potassium 40X1 and Potassium 10 IV X3, IVF 1000 X1, Ceftriaxone X1     UA: Trace Leuk esterases, moderate bacteria.    EKG showing NSR with L axis deviation (10 Apr 2020 02:27)            ---    HOSPITAL COURSE:     Patient was admitted for AMS likely metabolic encephalopathy. COVID testing was negative and patient was transferred to non-COVID unit. Patient noted to have very low potassium levels on admission which was repleted with both oral and IV supplementation.  However patient developed fever of 101.3F on 4/10 and ID, Dr. Nair, was consulted and repeat COVID testing was performed which was negative. Course of Rocephin was initiated for possible UTI. UCx (drawn after initiation of Rocephin) was negative, and BCx x2 showed NGTD.    Neuro, Dr. Cooper, was consulted for AMS and MRI/MRA 4/11 were performed which showed multiple acute tiny infarcts in posterior distribution. Per Dr. 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 Because patient was already on ASA 81mg and failed therapy, was switched to Plavix. Patient was started on atorvastatin 10mg and permissive HTN allowed for 48 hours. Labwork showed HbA1C 6.4; TSH 1.16 and lipid wnl (). CTA neck showed no carotid disease. Cardio, Dr. [facility], consulted, and echo was performed which showed LVEF 60% with trace MR and TR, deferred MOSES at this time. No events were noted on tele monitor.     She spiked another fever 4/12, repeat BCx x2 were drawn, showed ___.                  Speech and swallow consulted as well and recommended regular diet. PT recommended no PT needs.    s Patient responded well to medical treatment during hospitalization. Patient was seen and examined on the day of discharge and is medically optimized for discharge, with close outpatient follow up.        ---    CONSULTANTS:     Neuro- Dr. Cooper    ID- Dr. Nair    Cardio- Dr. Fink HPI:    83 yo female with PMHx of HTN, breast ca (s/p L lumpectomy 2018, currently on herceptin infusions q3 week, last 2 weeks ago) p/w AMS that started suddenly today. Patient is confused so daughter-in-law and son were called. Daughter-in-law states that around 7PM earlier today, when she was dropping off some food, when her mother-in-law answered the door she was noticeably disoriented and did not recognize her daughter-in-law. She complains of feeling lightheaded and dizzy, and repeatedly stated "how did you know I was here?" Last normal conversation was at 4:30PM. No recent trauma. Patient was complaining of not feeling well last Friday but attributed it to her chemotherapy. According to daughter, patient was complaining of very vague symptomatology, feeling lightheaded, dizzy with headaches, and nausea and diarrhea. Of note, last echo 3 months ago was normal, and patient has been wearing a pessary for the past few years, complaining of occasional discomfort but no acute changes recently.        ED vitals: T: 99.1 HR: 70, BP: 167/68 RR: 18 O2 Saturation: 98 on RA     Labs: k: 2.7, UA dirty    CT Head: No acute intracranial bleeding, mass effect, or shift. Mild chronic microvascular ischemic changes.     CT chest: Clear Lungs apart from minimal bibasilar dependent atelectasis.    In the ED patient received: 1000ml IVF bolus X1,Potassium 40X1 and Potassium 10 IV X3, IVF 1000 X1, Ceftriaxone X1     UA: Trace Leuk esterases, moderate bacteria.    EKG showing NSR with L axis deviation (10 Apr 2020 02:27)            ---    HOSPITAL COURSE:     Patient was admitted for AMS likely metabolic encephalopathy. COVID testing was negative and patient was transferred to non-COVID unit. Patient noted to have very low potassium levels on admission which was repleted with both oral and IV supplementation.  However patient developed fever of 101.3F on 4/10 and ID, Dr. Nair, was consulted and repeat COVID testing was performed which was negative. Course of Rocephin was initiated for possible UTI. UCx (drawn after initiation of Rocephin) was negative, and BCx x2 showed NGTD.    Neuro, Dr. Cooper, was consulted for AMS and MRI/MRA 4/11 were performed which showed multiple acute tiny infarcts in posterior distribution. Per Dr. 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 Because patient was already on ASA 81mg and failed therapy, was switched to Plavix. Patient was started on atorvastatin 10mg and permissive HTN allowed for 48 hours. Labwork showed HbA1C 6.4; TSH 1.16 and lipid wnl (). CTA neck showed no carotid disease. Cardio, Dr. [facility], consulted, and echo was performed which showed LVEF 60% with trace MR and TR, deferred MOSES at this time. No events were noted on tele monitor. Speech and swallow recommended regular diet. PT recommended no PT needs.    She spiked another fever 4/12, repeat BCx x2 were drawn per ID Dr. Nair, showed ___. Cardiology recommended CT chest with contrast to evaluate for LA thrombus.    Patient's altered mental status resolved during hospitalization. Patient was medically optimized and hemodynamically stable for discharge, with close follow up as an outpatient.        ---    CONSULTANTS:     Neuro- Dr. Cooper    ID- Dr. Nair    Cardio- Dr. Fink HPI:    85 yo female with PMHx of HTN, breast ca (s/p L lumpectomy 2018, currently on herceptin infusions q3 week, last 2 weeks ago) p/w AMS that started suddenly today. Patient is confused so daughter-in-law and son were called. Daughter-in-law states that around 7PM earlier today, when she was dropping off some food, when her mother-in-law answered the door she was noticeably disoriented and did not recognize her daughter-in-law. She complains of feeling lightheaded and dizzy, and repeatedly stated "how did you know I was here?" Last normal conversation was at 4:30PM. No recent trauma. Patient was complaining of not feeling well last Friday but attributed it to her chemotherapy. According to daughter, patient was complaining of very vague symptomatology, feeling lightheaded, dizzy with headaches, and nausea and diarrhea. Of note, last echo 3 months ago was normal, and patient has been wearing a pessary for the past few years, complaining of occasional discomfort but no acute changes recently.        ED vitals: T: 99.1 HR: 70, BP: 167/68 RR: 18 O2 Saturation: 98 on RA     Labs: k: 2.7, UA dirty    CT Head: No acute intracranial bleeding, mass effect, or shift. Mild chronic microvascular ischemic changes.     CT chest: Clear Lungs apart from minimal bibasilar dependent atelectasis.    In the ED patient received: 1000ml IVF bolus X1,Potassium 40X1 and Potassium 10 IV X3, IVF 1000 X1, Ceftriaxone X1     UA: Trace Leuk esterases, moderate bacteria.    EKG showing NSR with L axis deviation (10 Apr 2020 02:27)            ---    HOSPITAL COURSE:     Patient was admitted for AMS likely metabolic encephalopathy. COVID testing was negative and patient was transferred to non-COVID unit. Patient noted to have very low potassium levels on admission which was repleted with both oral and IV supplementation.  However patient developed fever of 101.3F on 4/10 and ID, Dr. Nair, was consulted and repeat COVID testing was performed which was negative. Course of Rocephin was initiated for possible UTI. UCx (drawn after initiation of Rocephin) was negative, and BCx x2 showed NGTD.    Neuro, Dr. Cooper, was consulted for AMS and MRI/MRA 4/11 were performed which showed multiple acute tiny infarcts in posterior distribution. Per Dr. 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 Because patient was already on ASA 81mg and failed therapy, was switched to Plavix. Patient was started on atorvastatin 10mg and permissive HTN allowed for 48 hours. Labwork showed HbA1C 6.4; TSH 1.16 and lipid wnl (). CTA neck showed no carotid disease. Cardio, Dr. [facility], consulted, and echo was performed which showed LVEF 60% with trace MR and TR, deferred MOSES at this time. No events were noted on tele monitor. Speech and swallow recommended regular diet. PT recommended no PT needs.    Patient's altered mental status resolved during hospitalization. She spiked another fever 4/12, repeat BCx x2 were drawn per ID Dr. Nair, showed NGTD on 4/14. Cardiology initially recommended CT chest with contrast to evaluate for LA thrombus, however d/w radiologist Dr Rodgers who recommended echo instead and stated CT contrast would not be the appropriate imaging choice. D/w cardio Dr Fink and initiated Eliquis 5 mg BID for presumed/possible LA thrombus / A fib for CVA prophylaxis. Patient was medically optimized and hemodynamically stable for discharge, with close follow up as an outpatient with cardio for Loop recorder and possible MOSES when feasible.         ---    CONSULTANTS:     Neuro- Dr. Cooper    ID- Dr. Nair    Cardio- Dr. Fink HPI:    83 yo female with PMHx of HTN, breast ca (s/p L lumpectomy 2018, currently on herceptin infusions q3 week, last 2 weeks ago) p/w AMS that started suddenly today. Patient is confused so daughter-in-law and son were called. Daughter-in-law states that around 7PM earlier today, when she was dropping off some food, when her mother-in-law answered the door she was noticeably disoriented and did not recognize her daughter-in-law. She complains of feeling lightheaded and dizzy, and repeatedly stated "how did you know I was here?" Last normal conversation was at 4:30PM. No recent trauma. Patient was complaining of not feeling well last Friday but attributed it to her chemotherapy. According to daughter, patient was complaining of very vague symptomatology, feeling lightheaded, dizzy with headaches, and nausea and diarrhea. Of note, last echo 3 months ago was normal, and patient has been wearing a pessary for the past few years, complaining of occasional discomfort but no acute changes recently.        ED vitals: T: 99.1 HR: 70, BP: 167/68 RR: 18 O2 Saturation: 98 on RA     Labs: k: 2.7, UA dirty    CT Head: No acute intracranial bleeding, mass effect, or shift. Mild chronic microvascular ischemic changes.     CT chest: Clear Lungs apart from minimal bibasilar dependent atelectasis.    In the ED patient received: 1000ml IVF bolus X1,Potassium 40X1 and Potassium 10 IV X3, IVF 1000 X1, Ceftriaxone X1     UA: Trace Leuk esterases, moderate bacteria.    EKG showing NSR with L axis deviation (10 Apr 2020 02:27)            ---    HOSPITAL COURSE:     Patient was admitted for AMS likely metabolic encephalopathy Vs TIA R/O CVA  COVID testing was negative and patient was transferred to non-COVID unit. Patient noted to have very low potassium levels on admission which was repleted with both oral and IV supplementation.  However patient developed fever of 101.3F on 4/10 and ID, Dr. Nair, was consulted and repeat COVID testing was performed which was negative. Course of Rocephin was initiated for possible UTI.in presence of pessary , UCx (sent out after initiation of Rocephin) was negative, and BCx x2 showed NGTD.    Neuro, Dr. Cooper, was consulted for AMS and MRI/MRA 4/11 were performed which showed multiple acute tiny infarcts in posterior distribution. Per Dr. 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 Because patient was already on ASA 81mg and failed therapy, was switched to Plavix. Patient was started on atorvastatin 10mg and permissive HTN allowed for 48 hours. Labwork showed HbA1C 6.4; TSH 1.16 and lipid wnl (). CTA neck showed no carotid disease. Cardio, Dr. [facility], consulted, and echo was performed which showed LVEF 60% with trace MR and TR, deferred MOSES at this time. No events were noted on tele monitor. Speech and swallow recommended regular diet. PT recommended no PT needs.    Patient's altered mental status resolved during hospitalization. She spiked another fever 4/12, repeat BCx x2 were drawn per ID Dr. Nair, showed NGTD on 4/14. Cardiology initially recommended CT chest with contrast to evaluate for LA thrombus, however d/w radiologist Dr Rodgers who recommended  MOSES-echo instead and stated CT contrast would not be the appropriate imaging choice. D/w cardio Dr Fink and initiated Eliquis 5 mg BID for presumed/possible LA thrombus / A fib for CVA prevention . Patient was medically optimized and hemodynamically stable for discharge, with close follow up as an outpatient with cardio for Loop recorder and possible [facility] when feasible. Family made aware of all of above changes with meds. Pt MUST take K Cl & Plavix & Eliquis, risks of increase bleeding was d/w pt & Sons/Dtr in Law at detail.    D/W H/O Dr Esparza on D/C .         ---    CONSULTANTS:     Neuro- Dr. Cooper    ID- Dr. Nair    Cardio- Dr. Fink

## 2020-04-10 NOTE — DISCHARGE NOTE PROVIDER - CARE PROVIDER_API CALL
Juan Alberto Callahan (MD)  Medicine  [address] [place] [address]  Phone: [phone]  Fax: [phone]  Follow Up Time: Juan Alberto Callahan)  Medicine  [address] [place] [address]  Phone: [phone]  Fax: [phone]  Follow Up Time:     Manuel Tong)  Cardiovascular Disease; Internal Medicine  [address] [place], [place] [address]  Phone: [phone]  Fax: [phone]  Follow Up Time: Juan Alberto Callahan)  Medicine  [address] [place] [address]  Phone: [phone]  Fax: [phone]  Follow Up Time: 1-3 days    Manuel Tong)  Cardiovascular Disease; Internal Medicine  [address] [place], [place] [address]  Phone: [phone]  Fax: [phone]  Follow Up Time: Routine Juan Alberto Callahan)  Medicine  [address] [place] [address]  Phone: [phone]  Fax: [phone]  Follow Up Time: 1-3 days    Manuel Tong)  Cardiovascular Disease; Internal Medicine  [address] [place], [place] [id number]  Phone: [phone]  Fax: [phone]  Follow Up Time: 1 week Juan Alberto Callahan)  Medicine  [address] [place] [address]  Phone: [phone]  Fax: [phone]  Follow Up Time: 1 week    Manuel Tong)  Cardiovascular Disease; Internal Medicine  [address] [place], [place] [address]  Phone: [phone]  Fax: [phone]  Follow Up Time: 1 week    Jackie Esparza)  Hematology; Internal Medicine  [facility], [address] [place] [address]  Phone: [phone]  Fax: [phone]  Follow Up Time: Routine Juan Alberto Callahan)  Medicine  [address] [place] [address]  Phone: [phone]  Fax: [phone]  Follow Up Time: 1 week    Manuel Tong)  Cardiovascular Disease; Internal Medicine  [address] [place] [address]  Phone: [phone]  Fax: [phone]  Follow Up Time: 1 week    Jackie Esparza)  Hematology; Internal Medicine  [facility], [address]  [place] [address]  Phone: [phone]  Fax: [phone]  Follow Up Time: Routine    Iris Cooper)  Neurology  [address] [place] [address]  Phone: [phone]  Fax: [phone]  Follow Up Time:

## 2020-04-10 NOTE — H&P ADULT - PROBLEM SELECTOR PLAN 6
Start Lovenox 40 mg BID   IMPROVE VTE Individual Risk Assessment    RISK                                                                Points  [  ] Previous VTE                                                  3  [  ] Thrombophilia                                               2  [  ] Lower limb paralysis                                      2        (unable to hold up >15 seconds)    [ x ] Current Cancer                                              2         (within 6 months)  [  ] Immobilization > 24 hrs                                1  [  ] ICU/CCU stay > 24 hours                              1  [ x ] Age > 60                                                      1  IMPROVE VTE Score: 3    IMPROVE Score 0-1: Low Risk, No VTE prophylaxis required for most patients, encourage ambulation.   IMPROVE Score 2-3: At risk, pharmacologic VTE prophylaxis is indicated for most patients (in the absence of a contraindication)  IMPROVE Score > or = 4: High Risk, pharmacologic VTE prophylaxis is indicated for most patients (in the absence of a contraindication) had extensive discussion with daughter, daughter-in-law, and son  -son and daughter are HCP, MOLST form is in patient's home, son states that he will find the MOLST form tomorrow (unsure whether or not patient is DNR/DNI)

## 2020-04-10 NOTE — H&P ADULT - PROBLEM SELECTOR PLAN 4
patient is on multiple home meds, according to daughter-in-law, patient was not taking potassium chloride tabs every day, last prescribed on 11/6/19 for a 3 month supply  -on amlodipine, atenolol, HCTZ, lisinopril at home, continue with hold parameters

## 2020-04-10 NOTE — H&P ADULT - NSHPOUTPATIENTPROVIDERS_GEN_ALL_CORE
pmd: gareth  cards: ottoniel gregorio  onc: chinmay brown PMD: Dr. Callahan  Cards: Dr. Tong  Onc: Jackie Esparza

## 2020-04-10 NOTE — DISCHARGE NOTE PROVIDER - INSTRUCTIONS
Regular diet Regular diet  Low sodium Consistent carbohydrate diet with whole grains, fruits and vegetables. Low sodium and low cholesterol.

## 2020-04-10 NOTE — SWALLOW BEDSIDE ASSESSMENT ADULT - ASR SWALLOW ASPIRATION MONITOR
change of breathing pattern/position upright (90Y)/cough/oral hygiene/gurgly voice/fever/pneumonia/throat clearing/upper respiratory infection

## 2020-04-10 NOTE — DISCHARGE NOTE PROVIDER - NSDCMRMEDTOKEN_GEN_ALL_CORE_FT
amLODIPine 5 mg oral tablet: 1 tab(s) orally once a day  aspirin 81 mg oral tablet: 1 tab(s) orally once a day  atenolol 100 mg oral tablet: 1 tab(s) orally once a day  Herceptin: every 3 weeks   hydroCHLOROthiazide 25 mg oral tablet: 1 tab(s) orally once a day  lisinopril 40 mg oral tablet: 1 tab(s) orally once a day  potassium chloride 10 mEq oral capsule, extended release: orally once a day amLODIPine 5 mg oral tablet: 1 tab(s) orally once a day  atenolol 100 mg oral tablet: 1 tab(s) orally once a day  Herceptin: every 3 weeks   hydroCHLOROthiazide 25 mg oral tablet: 1 tab(s) orally once a day  lisinopril 40 mg oral tablet: 1 tab(s) orally once a day  potassium chloride 10 mEq oral capsule, extended release: orally once a day amLODIPine 5 mg oral tablet: 1 tab(s) orally once a day  atenolol 100 mg oral tablet: 1 tab(s) orally once a day  Eliquis 5 mg oral tablet: 1 tab(s) orally 2 times a day   Herceptin: every 3 weeks   hydroCHLOROthiazide 25 mg oral tablet: 1 tab(s) orally once a day  lisinopril 40 mg oral tablet: 1 tab(s) orally once a day  potassium chloride 10 mEq oral capsule, extended release: orally once a day amLODIPine 5 mg oral tablet: 1 tab(s) orally once a day  atenolol 100 mg oral tablet: 1 tab(s) orally once a day  Eliquis 5 mg oral tablet: 1 tab(s) orally 2 times a day   Herceptin: every 3 weeks   hydroCHLOROthiazide 25 mg oral tablet: 1 tab(s) orally once a day  Lipitor 10 mg oral tablet: 1 tab(s) orally once a day (at bedtime)  lisinopril 40 mg oral tablet: 1 tab(s) orally once a day  Plavix 75 mg oral tablet: 1 tab(s) orally once a day  potassium chloride 10 mEq oral capsule, extended release: 1 tab(s) orally once a day amLODIPine 5 mg oral tablet: 1 tab(s) orally once a day  atenolol 100 mg oral tablet: 1 tab(s) orally once a day  Eliquis 5 mg oral tablet: 1 tab(s) orally 2 times a day   famotidine 20 mg oral tablet: 1 tab(s) orally once a day  Herceptin: every 3 weeks  to be D/W Dr Esparza  hydroCHLOROthiazide 25 mg oral tablet: 1 tab(s) orally once a day  Lipitor 10 mg oral tablet: 1 tab(s) orally once a day (at bedtime)  lisinopril 40 mg oral tablet: 1 tab(s) orally once a day  Plavix 75 mg oral tablet: 1 tab(s) orally once a day  potassium chloride 10 mEq oral capsule, extended release: 1 tab(s) orally once a day

## 2020-04-10 NOTE — H&P ADULT - NSHPSOCIALHISTORY_GEN_ALL_CORE
, lives alone. ADLs independent, Walks independently, never smoker, denies alcohol use, denies recreational drug use. Flu vaccine up to date.

## 2020-04-10 NOTE — PROGRESS NOTE ADULT - ASSESSMENT
83 yo female with PMHx of HTN, breast ca (s/p L lumpectomy 2018, currently on herceptin infusions q3 week, last 2 weeks ago) p/w AMS, admitted for metabolic encephalopathy 2/2 UTI vs. CVA vs. COVID-19, COVID NEGATIVE. 85 yo female with PMHx of HTN, breast ca (s/p L lumpectomy 2018, currently on herceptin infusions q3 week, last 2 weeks ago) p/w AMS, admitted for metabolic encephalopathy 2/2 UTI vs. CVA vs. COVID-19, COVID NEGATIVE.

## 2020-04-10 NOTE — H&P ADULT - PROBLEM SELECTOR PLAN 7
Start Lovenox 40 mg BID   IMPROVE VTE Individual Risk Assessment    RISK                                                                Points  [  ] Previous VTE                                                  3  [  ] Thrombophilia                                               2  [  ] Lower limb paralysis                                      2        (unable to hold up >15 seconds)    [ x ] Current Cancer                                              2         (within 6 months)  [  ] Immobilization > 24 hrs                                1  [  ] ICU/CCU stay > 24 hours                              1  [ x ] Age > 60                                                      1  IMPROVE VTE Score: 3    IMPROVE Score 0-1: Low Risk, No VTE prophylaxis required for most patients, encourage ambulation.   IMPROVE Score 2-3: At risk, pharmacologic VTE prophylaxis is indicated for most patients (in the absence of a contraindication)  IMPROVE Score > or = 4: High Risk, pharmacologic VTE prophylaxis is indicated for most patients (in the absence of a contraindication)

## 2020-04-10 NOTE — H&P ADULT - RS GEN PE MLT RESP DETAILS PC
breath sounds equal/good air movement/clear to auscultation bilaterally/normal/no rhonchi/no rales/diminished breath sounds, R/diminished breath sounds, L/no wheezes

## 2020-04-10 NOTE — SWALLOW BEDSIDE ASSESSMENT ADULT - COMMENTS
Consult received and chart reviewed. The patient was seen at bedside this afternoon for an initial assessment of swallow function, at which time she was alert and cooperative. The patient reported 0/10 pain pre/post today's evaluation and denied any breathing difficulties. The patient was agreeable to participate in today's evaluation.    Per charting, the patient is an "85 yo female with PMHx of HTN, breast ca (s/p L lumpectomy 2018, currently on herceptin infusions q3 week, last 2 weeks ago) p/w AMS that started suddenly today." Patient tested for COVID which came back negative x1. Most recent CT of the head revealed, "No acute intracranial bleeding, mass effect, or shift. Mild chronic microvascular ischemic changes." In addition, recent CT of the chest revealed, "Clear Lungs apart from minimal bibasilar dependent atelectasis." Discussed results and recommendations from this evaluation with the patient, RN, and call out to MD.

## 2020-04-10 NOTE — H&P ADULT - ASSESSMENT
85 yo female with PMHx of HTN, breast ca (s/p L lumpectomy 2018, currently on herceptin infusions q3 week, last 2 weeks ago) p/w AMS, admitted for metabolic encephalopathy 2/2 UTI vs. CVA vs. COVID-19

## 2020-04-10 NOTE — H&P ADULT - PROBLEM SELECTOR PLAN 3
potassium 2.7 on admission, s/p 40 mEq tablet x1, currently receiving K-riders x3  -repeat BMP in AM, replete further as needed potassium 2.7 on admission, s/p 40 mEq tablet x1, currently receiving K-riders x3  -repeat BMP in AM, replete further as needed  BMP in AM

## 2020-04-10 NOTE — PROGRESS NOTE ADULT - PROBLEM SELECTOR PLAN 3
potassium 2.7 on admission, repleted  -follow up BMP in AM, replete further as needed potassium 2.7 on admission, repleted  -now 2.9, repleted again  -follow up BMP in AM, replete further as needed patient is on multiple home meds, according to daughter-in-law, patient was not taking potassium chloride tabs every day, last prescribed on 11/6/19 for a 3 month supply  -on amlodipine, atenolol, HCTZ, lisinopril at home, continue with hold parameters

## 2020-04-10 NOTE — DISCHARGE NOTE PROVIDER - NSDCACTIVITY_GEN_ALL_CORE
Showering allowed/Bathing allowed/Stairs allowed Bathing allowed/Showering allowed/Walking - Indoors allowed/No heavy lifting/straining/Do not drive or operate machinery/Stairs allowed

## 2020-04-10 NOTE — PROGRESS NOTE ADULT - SUBJECTIVE AND OBJECTIVE BOX
Patient is a 84y old  Female who presents with a chief complaint of AMS (10 Apr 2020 02:27)      INTERVAL HPI:  83 yo female with PMHx of HTN, breast ca (s/p L lumpectomy 2018, currently on herceptin infusions q3 week, last 2 weeks ago) p/w AMS that started suddenly today. Patient is confused so daughter-in-law and son were called. Daughter-in-law states that around 7PM earlier today, when she was dropping off some food, when her mother-in-law answered the door she was noticeably disoriented and did not recognize her daughter-in-law. She complains of feeling lightheaded and dizzy, and repeatedly stated "how did you know I was here?" Last normal conversation was at 4:30PM. No recent trauma. Patient was complaining of not feeling well last Friday but attributed it to her chemotherapy. According to daughter, patient was complaining of very vague symptomatology, feeling lightheaded, dizzy with headaches, and nausea and diarrhea. Of note, last echo 3 months ago was normal, and patient has been wearing a pessary for the past few years, complaining of occasional discomfort but no acute changes recently.    4/10/2020: No acute events since admission over night.  Saturating in high 90s on room air.  COVID NEGATIVE.  MR head ordered by neuro.    OVERNIGHT EVENTS: none    Home Medications:  amLODIPine 5 mg oral tablet: 1 tab(s) orally once a day (10 Apr 2020 02:56)  aspirin 81 mg oral tablet: 1 tab(s) orally once a day (10 Apr 2020 02:56)  atenolol 100 mg oral tablet: 1 tab(s) orally once a day (10 Apr 2020 03:00)  Herceptin: every 3 weeks  (10 Apr 2020 03:00)  hydroCHLOROthiazide 25 mg oral tablet: 1 tab(s) orally once a day (10 Apr 2020 02:56)  lisinopril 40 mg oral tablet: 1 tab(s) orally once a day (10 Apr 2020 03:00)  potassium chloride 10 mEq oral capsule, extended release: orally once a day (10 Apr 2020 03:00)      MEDICATIONS  (STANDING):  amLODIPine   Tablet 5 milliGRAM(s) Oral daily  aspirin enteric coated 81 milliGRAM(s) Oral daily  ATENolol  Tablet 100 milliGRAM(s) Oral daily  cefTRIAXone   IVPB 1000 milliGRAM(s) IV Intermittent every 24 hours  enoxaparin Injectable 40 milliGRAM(s) SubCutaneous two times a day  hydrochlorothiazide 25 milliGRAM(s) Oral daily  lisinopril 40 milliGRAM(s) Oral daily    MEDICATIONS  (PRN):  acetaminophen   Tablet .. 650 milliGRAM(s) Oral every 4 hours PRN Temp greater or equal to 38.5C (101.3F)      Allergies    No Known Allergies    Intolerances    REVIEW OF SYSTEMS:  CONSTITUTIONAL: No fever, No chills, No fatigue, admits to myalgia, No Body ache, No Weakness  EYES: No eye pain,  No visual disturbances, No discharge, No Redness  ENMT: No ear pain, No nose bleed, No vertigo; No sinus or throat pain, No Congestion  NECK: No pain, No stiffness  RESPIRATORY: no cough, No wheezing, No hemoptysis, no shortness of breath  CARDIOVASCULAR: No chest pain, admits to palpitations  GASTROINTESTINAL: No abdominal or epigastric pain. No nausea, No vomiting, No diarrhea or constipation; [  ] BM  GENITOURINARY: No dysuria, No frequency, No urgency, No hematuria or incontinence  NEUROLOGICAL: No headaches, No dizziness, No numbness, No tingling, No tremors, No weakness  EXT: No Swelling, No Pain, No Edema  SKIN: [ X ] No itching, burning, rashes, or lesions   MUSCULOSKELETAL: No joint pain or swelling; No muscle pain, No back pain, No extremity pain  PSYCHIATRIC: No depression, anxiety, mood swings or difficulty sleeping at night  PAIN SCALE: [X  ] None  [  ] Other-  ROS Unable to obtain due to: [  ] Dementia  [  ] Lethargy  [  ] Sedated  [  ]  non verbal [X ] AMS  REST OF REVIEW OF SYSTEMS: [ x ] Normal      Vital Signs Last 24 Hrs  T(C): 37 (10 Apr 2020 06:11), Max: 37.3 (10 Apr 2020 00:34)  T(F): 98.6 (10 Apr 2020 06:11), Max: 99.1 (10 Apr 2020 00:34)  HR: 62 (10 Apr 2020 06:11) (62 - 76)  BP: 125/69 (10 Apr 2020 06:11) (125/69 - 187/84)  BP(mean): --  RR: 18 (10 Apr 2020 06:11) (16 - 18)  SpO2: 96% (10 Apr 2020 06:11) (96% - 98%)    Finger Stick      PHYSICAL EXAM:  GENERAL:  [ X ] NAD, [X  ] Well appearing, [  ] Agitated, [  ] Drowsy, [  ] Lethargy, [  ] Confused   HEAD:  [X  ] Normal, [  ] Other  EYES:  [ X ] EOMI, [X  ] PERRLA, [X  ] Conjunctiva and sclera clear normal, [  ] Other, [  ] Pallor, [  ] Discharge  ENMT:  [ X ] Normal, [ X ] Moist mucous membranes, [ X ] Good dentition, [  ] No thrush  NECK:  [ X ] Supple, [X  ] No JVD, [  ] Normal thyroid, [  ] Lymphadenopathy, [  ] Other  CHEST/LUNG:  [X  ] Clear to auscultation bilaterally, [  ] Breath Sounds equal OR decreased, [X  ] No rales, [ X ] No rhonchi, [X  ] No wheezing  HEART:  [ X ] Regular rate and rhythm, [  ] Tachycardia, [  ] Bradycardia, [  ] Irregular, [X  ] No murmurs, No rubs, No gallops, [  ] PPM in place (Mfr:  )  ABDOMEN:  [ X ] Soft, [ X ] Nontender, [ X ] Nondistended, [X  ] No mass, [X  ] Bowel sounds present, [  ] Obese  NERVOUS SYSTEM:  [ X ] Alert & Oriented x3, [X  ] Nonfocal, [  ] Confusion, [  ] Encephalopathic, [  ] Sedated, [  ] Unable to assess, [  ] Other-  EXTREMITIES:  [ X ] 2+ Peripheral Pulses, No clubbing, No cyanosis,  [  ] edema B/L lower EXT, [  ] PVD stasis skin changes B/L lower EXT  LYMPH:  No lymphadenopathy noted  SKIN:  [X  ] No rashes or lesions, [  ] Pressure ulcers, [  ] Ecchymosis, [  ] Skin tears, [  ] Other    DIET: NPO      LABS:                        13.6   6.69  )-----------( 154      ( 2020 21:56 )             39.5     2020 21:56    136    |  100    |  14     ----------------------------<  153    2.7     |  27     |  0.72     Ca    8.9        2020 21:56  Mg     1.7       2020 21:56    TPro  8.6    /  Alb  3.6    /  TBili  0.4    /  DBili  x      /  AST  21     /  ALT  18     /  AlkPhos  86     2020 21:56      Urinalysis Basic - ( 2020 22:04 )    Color: Yellow / Appearance: Slightly Turbid / S.005 / pH: x  Gluc: x / Ketone: Small  / Bili: Negative / Urobili: Negative   Blood: x / Protein: 30 mg/dL / Nitrite: Negative   Leuk Esterase: Trace / RBC: x / WBC 3-5   Sq Epi: x / Non Sq Epi: Few / Bacteria: Moderate                CARDIAC MARKERS ( 2020 21:56 )  <.015 ng/mL / x     / x     / x     / x                 Anemia Panel:      Thyroid Panel:        Lipase, Serum: 215 U/L (20 @ 21:56)          RADIOLOGY & ADDITIONAL TESTS:    HEALTH ISSUES - PROBLEM Dx:  Advanced directives, counseling/discussion: Advanced directives, counseling/discussion  Hypokalemia: Hypokalemia  Need for prophylactic measure: Need for prophylactic measure  HTN (hypertension): HTN (hypertension)  Metabolic encephalopathy: Metabolic encephalopathy      Consultant(s) Notes Reviewed:  [X  ] YES     Care Discussed with [ x ] Consultants, [ X ] Patient, [  ] Family, [X  ] , [ X ] Social Service, [X  ] RN, [  ] Physical Therapy  DVT PPX: [X  ] Lovenox, [  ] S C Heparin, [  ] Coumadin, [  ] Xarelto, [  ] Eliquis, [  ] Pradaxa, [  ] IV Heparin drip, [  ] SCD, [  ] Contraindication secondary to GI Bleed, [  ] Ambulation  Advanced Directive: [X  ] None, [  ] DNR/DNI Patient is a 84y old  Female who presents with a chief complaint of AMS (10 Apr 2020 02:27)      INTERVAL HPI:  83 yo female with PMHx of HTN, breast ca (s/p L lumpectomy 2018, currently on herceptin infusions q3 week, last 2 weeks ago) p/w AMS that started suddenly today. Patient is confused so daughter-in-law and son were called. Daughter-in-law states that around 7PM earlier today, when she was dropping off some food, when her mother-in-law answered the door she was noticeably disoriented and did not recognize her daughter-in-law. She complains of feeling lightheaded and dizzy, and repeatedly stated "how did you know I was here?" Last normal conversation was at 4:30PM. No recent trauma. Patient was complaining of not feeling well last Friday but attributed it to her chemotherapy. According to daughter, patient was complaining of very vague symptomatology, feeling lightheaded, dizzy with headaches, and nausea and diarrhea. Of note, last echo 3 months ago was normal, and patient has been wearing a pessary for the past few years, complaining of occasional discomfort but no acute changes recently.    4/10/2020: No acute events since admission over night.  Saturating in high 90s on room air.  COVID NEGATIVE.  MR head ordered by neuro. S&S emile pending. K repleted.    OVERNIGHT EVENTS: none    Home Medications:  amLODIPine 5 mg oral tablet: 1 tab(s) orally once a day (10 Apr 2020 02:56)  aspirin 81 mg oral tablet: 1 tab(s) orally once a day (10 Apr 2020 02:56)  atenolol 100 mg oral tablet: 1 tab(s) orally once a day (10 Apr 2020 03:00)  Herceptin: every 3 weeks  (10 Apr 2020 03:00)  hydroCHLOROthiazide 25 mg oral tablet: 1 tab(s) orally once a day (10 Apr 2020 02:56)  lisinopril 40 mg oral tablet: 1 tab(s) orally once a day (10 Apr 2020 03:00)  potassium chloride 10 mEq oral capsule, extended release: orally once a day (10 Apr 2020 03:00)      MEDICATIONS  (STANDING):  amLODIPine   Tablet 5 milliGRAM(s) Oral daily  aspirin enteric coated 81 milliGRAM(s) Oral daily  ATENolol  Tablet 100 milliGRAM(s) Oral daily  cefTRIAXone   IVPB 1000 milliGRAM(s) IV Intermittent every 24 hours  enoxaparin Injectable 40 milliGRAM(s) SubCutaneous two times a day  hydrochlorothiazide 25 milliGRAM(s) Oral daily  lisinopril 40 milliGRAM(s) Oral daily    MEDICATIONS  (PRN):  acetaminophen   Tablet .. 650 milliGRAM(s) Oral every 4 hours PRN Temp greater or equal to 38.5C (101.3F)      Allergies    No Known Allergies    Intolerances    REVIEW OF SYSTEMS:  CONSTITUTIONAL: No fever, No chills, No fatigue, admits to myalgia, No Body ache, No Weakness  EYES: No eye pain,  No visual disturbances, No discharge, No Redness  ENMT: No ear pain, No nose bleed, No vertigo; No sinus or throat pain, No Congestion  NECK: No pain, No stiffness  RESPIRATORY: no cough, No wheezing, No hemoptysis, no shortness of breath  CARDIOVASCULAR: No chest pain, admits to palpitations  GASTROINTESTINAL: No abdominal or epigastric pain. No nausea, No vomiting, No diarrhea or constipation; [  ] BM  GENITOURINARY: No dysuria, No frequency, No urgency, No hematuria or incontinence  NEUROLOGICAL: No headaches, No dizziness, No numbness, No tingling, No tremors, No weakness  EXT: No Swelling, No Pain, No Edema  SKIN: [ X ] No itching, burning, rashes, or lesions   MUSCULOSKELETAL: No joint pain or swelling; No muscle pain, No back pain, No extremity pain  PSYCHIATRIC: No depression, anxiety, mood swings or difficulty sleeping at night  PAIN SCALE: [X  ] None  [  ] Other-  ROS Unable to obtain due to: [  ] Dementia  [  ] Lethargy  [  ] Sedated  [  ]  non verbal [X ] AMS  REST OF REVIEW OF SYSTEMS: [ x ] Normal      Vital Signs Last 24 Hrs  T(C): 37 (10 Apr 2020 06:11), Max: 37.3 (10 Apr 2020 00:34)  T(F): 98.6 (10 Apr 2020 06:11), Max: 99.1 (10 Apr 2020 00:34)  HR: 62 (10 Apr 2020 06:11) (62 - 76)  BP: 125/69 (10 Apr 2020 06:11) (125/69 - 187/84)  BP(mean): --  RR: 18 (10 Apr 2020 06:11) (16 - 18)  SpO2: 96% (10 Apr 2020 06:11) (96% - 98%)    Finger Stick      PHYSICAL EXAM:  GENERAL:  [ X ] NAD, [X  ] Well appearing, [  ] Agitated, [  ] Drowsy, [  ] Lethargy, [  ] Confused   HEAD:  [X  ] Normal, [  ] Other  EYES:  [ X ] EOMI, [X  ] PERRLA, [X  ] Conjunctiva and sclera clear normal, [  ] Other, [  ] Pallor, [  ] Discharge  ENMT:  [ X ] Normal, [ X ] Moist mucous membranes, [ X ] Good dentition, [  ] No thrush  NECK:  [ X ] Supple, [X  ] No JVD, [  ] Normal thyroid, [  ] Lymphadenopathy, [  ] Other  CHEST/LUNG:  [X  ] Clear to auscultation bilaterally, [  ] Breath Sounds equal OR decreased, [X  ] No rales, [ X ] No rhonchi, [X  ] No wheezing  HEART:  [ X ] Regular rate and rhythm, [  ] Tachycardia, [  ] Bradycardia, [  ] Irregular, [X  ] No murmurs, No rubs, No gallops, [  ] PPM in place (Mfr:  )  ABDOMEN:  [ X ] Soft, [ X ] Nontender, [ X ] Nondistended, [X  ] No mass, [X  ] Bowel sounds present, [  ] Obese  NERVOUS SYSTEM:  [ X ] Alert & Oriented x3, [X  ] Nonfocal, [  ] Confusion, [  ] Encephalopathic, [  ] Sedated, [  ] Unable to assess, [  ] Other-  EXTREMITIES:  [ X ] 2+ Peripheral Pulses, No clubbing, No cyanosis,  [  ] edema B/L lower EXT, [  ] PVD stasis skin changes B/L lower EXT  LYMPH:  No lymphadenopathy noted  SKIN:  [X  ] No rashes or lesions, [  ] Pressure ulcers, [  ] Ecchymosis, [  ] Skin tears, [  ] Other    DIET: DASH      LABS:                        13.0   9.60  )-----------( 153      ( 10 Apr 2020 12:40 )             36.6     10 Apr 2020 12:40    135    |  99     |  12     ----------------------------<  119    2.9     |  27     |  0.71     Ca    8.7        10 Apr 2020 12:40  Mg     1.7       2020 21:56    TPro  7.6    /  Alb  3.2    /  TBili  0.6    /  DBili  x      /  AST  19     /  ALT  14     /  AlkPhos  72     10 Apr 2020 12:40      Urinalysis Basic - ( 2020 22:04 )    Color: Yellow / Appearance: Slightly Turbid / S.005 / pH: x  Gluc: x / Ketone: Small  / Bili: Negative / Urobili: Negative   Blood: x / Protein: 30 mg/dL / Nitrite: Negative   Leuk Esterase: Trace / RBC: x / WBC 3-5   Sq Epi: x / Non Sq Epi: Few / Bacteria: Moderate                CARDIAC MARKERS ( 2020 21:56 )  <.015 ng/mL / x     / x     / x     / x                 Anemia Panel:      Thyroid Panel:        Lipase, Serum: 215 U/L (20 @ 21:56)      RADIOLOGY & ADDITIONAL TESTS:    HEALTH ISSUES - PROBLEM Dx:  Advanced directives, counseling/discussion: Advanced directives, counseling/discussion  Hypokalemia: Hypokalemia  Need for prophylactic measure: Need for prophylactic measure  HTN (hypertension): HTN (hypertension)  Metabolic encephalopathy: Metabolic encephalopathy      Consultant(s) Notes Reviewed:  [X  ] YES     Care Discussed with [ x ] Consultants, [ X ] Patient, [  ] Family, [X  ] , [ X ] Social Service, [X  ] RN, [  ] Physical Therapy  DVT PPX: [X  ] Lovenox, [  ] S C Heparin, [  ] Coumadin, [  ] Xarelto, [  ] Eliquis, [  ] Pradaxa, [  ] IV Heparin drip, [  ] SCD, [  ] Contraindication secondary to GI Bleed, [  ] Ambulation  Advanced Directive: [X  ] None, [  ] DNR/DNI Patient is a 84y old  Female who presents with a chief complaint of AMS (10 Apr 2020 02:27)      INTERVAL HPI:  85 yo female with PMHx of HTN, breast ca (s/p L lumpectomy 2018, currently on herceptin infusions q3 week, last 2 weeks ago) p/w AMS that started suddenly today. Patient is confused so daughter-in-law and son were called. Daughter-in-law states that around 7PM earlier today, when she was dropping off some food, when her mother-in-law answered the door she was noticeably disoriented and did not recognize her daughter-in-law. She complains of feeling lightheaded and dizzy, and repeatedly stated "how did you know I was here?" Last normal conversation was at 4:30PM. No recent trauma. Patient was complaining of not feeling well last Friday but attributed it to her chemotherapy. According to daughter, patient was complaining of very vague symptomatology, feeling lightheaded, dizzy with headaches, and nausea and diarrhea. Of note, last echo 3 months ago was normal, and patient has been wearing a pessary for the past few years, complaining of occasional discomfort but no acute changes recently.    4/10/2020: No acute events since admission over night.  Saturating in high 90s on room air.  COVID NEGATIVE.  MR head ordered by neuro. S&S --Regular Diet. K repleted.    OVERNIGHT EVENTS: none    Home Medications:  amLODIPine 5 mg oral tablet: 1 tab(s) orally once a day (10 Apr 2020 02:56)  aspirin 81 mg oral tablet: 1 tab(s) orally once a day (10 Apr 2020 02:56)  atenolol 100 mg oral tablet: 1 tab(s) orally once a day (10 Apr 2020 03:00)  Herceptin: every 3 weeks  (10 Apr 2020 03:00)  hydroCHLOROthiazide 25 mg oral tablet: 1 tab(s) orally once a day (10 Apr 2020 02:56)  lisinopril 40 mg oral tablet: 1 tab(s) orally once a day (10 Apr 2020 03:00)  potassium chloride 10 mEq oral capsule, extended release: orally once a day (10 Apr 2020 03:00)      MEDICATIONS  (STANDING):  amLODIPine   Tablet 5 milliGRAM(s) Oral daily  aspirin enteric coated 81 milliGRAM(s) Oral daily  ATENolol  Tablet 100 milliGRAM(s) Oral daily  cefTRIAXone   IVPB 1000 milliGRAM(s) IV Intermittent every 24 hours  enoxaparin Injectable 40 milliGRAM(s) SubCutaneous two times a day  hydrochlorothiazide 25 milliGRAM(s) Oral daily  lisinopril 40 milliGRAM(s) Oral daily    MEDICATIONS  (PRN):  acetaminophen   Tablet .. 650 milliGRAM(s) Oral every 4 hours PRN Temp greater or equal to 38.5C (101.3F)      Allergies    No Known Allergies    Intolerances    REVIEW OF SYSTEMS: i feel fine   CONSTITUTIONAL: No fever, No chills, No fatigue, admits to myalgia, No Body ache, No Weakness  EYES: No eye pain,  No visual disturbances, No discharge, No Redness  ENMT: No ear pain, No nose bleed, No vertigo; No sinus or throat pain, No Congestion  NECK: No pain, No stiffness  RESPIRATORY: no cough, No wheezing, No hemoptysis, no shortness of breath  CARDIOVASCULAR: No chest pain, admits to palpitations  GASTROINTESTINAL: No abdominal or epigastric pain. No nausea, No vomiting, No diarrhea or constipation; [  ] BM  GENITOURINARY: No dysuria, No frequency, No urgency, No hematuria or incontinence  NEUROLOGICAL: No headaches, No dizziness, No numbness, No tingling, No tremors, No weakness  EXT: No Swelling, No Pain, No Edema  SKIN: [ X ] No itching, burning, rashes, or lesions   MUSCULOSKELETAL: No joint pain or swelling; No muscle pain, No back pain, No extremity pain  PSYCHIATRIC: No depression, anxiety, mood swings or difficulty sleeping at night  PAIN SCALE: [X  ] None  [  ] Other-  ROS Unable to obtain due to: [  ] Dementia  [  ] Lethargy  [  ] Sedated  [  ]  non verbal [X ] AMS  REST OF REVIEW OF SYSTEMS: [ x ] Normal      Vital Signs Last 24 Hrs  T(C): 37 (10 Apr 2020 06:11), Max: 37.3 (10 Apr 2020 00:34)  T(F): 98.6 (10 Apr 2020 06:11), Max: 99.1 (10 Apr 2020 00:34)  HR: 62 (10 Apr 2020 06:11) (62 - 76)  BP: 125/69 (10 Apr 2020 06:11) (125/69 - 187/84)  BP(mean): --  RR: 18 (10 Apr 2020 06:11) (16 - 18)  SpO2: 96% (10 Apr 2020 06:11) (96% - 98%)    Finger Stick      PHYSICAL EXAM: No Dysarthria   GENERAL:  [ X ] NAD, [X  ] Well appearing, [  ] Agitated, [  ] Drowsy, [  ] Lethargy, [  ] Confused   HEAD:  [X  ] Normal, [  ] Other  EYES:  [ X ] EOMI, [X  ] PERRLA, [X  ] Conjunctiva and sclera clear normal, [  ] Other, [  ] Pallor, [  ] Discharge  ENMT:  [ X ] Normal, [ X ] Moist mucous membranes, [ X ] Good dentition, [  ] No thrush  NECK:  [ X ] Supple, [X  ] No JVD, [  ] Normal thyroid, [  ] Lymphadenopathy, [  ] Other  CHEST/LUNG:  [X  ] Clear to auscultation bilaterally, [ x ] Breath Sounds equal B/L decreased, [X  ] No rales, [ X ] No rhonchi, [X  ] No wheezing  HEART:  [ X ] Regular rate and rhythm, [  ] Tachycardia, [  ] Bradycardia, [  ] Irregular, [X  ] No murmurs, No rubs, No gallops, [  ] PPM in place (Mfr:  )  ABDOMEN:  [ X ] Soft, [ X ] Nontender, [ X ] Nondistended, [X  ] No mass, [X  ] Bowel sounds present, [x  ] Obese  NERVOUS SYSTEM:  [ X ] Alert & Oriented x3, [X  ] Nonfocal, [  ] Confusion, [  ] Encephalopathic, [  ] Sedated, [  ] Unable to assess, [  ] Other-  EXTREMITIES:  [ X ] 2+ Peripheral Pulses, No clubbing, No cyanosis,  [  ] edema B/L lower EXT, [  ] PVD stasis skin changes B/L lower EXT  LYMPH:  No lymphadenopathy noted  SKIN:  [X  ] No rashes or lesions, [  ] Pressure ulcers, [  ] Ecchymosis, [  ] Skin tears, [  ] Other    DIET: DASH      LABS:                        13.0   9.60  )-----------( 153      ( 10 Apr 2020 12:40 )             36.6     10 Apr 2020 12:40    135    |  99     |  12     ----------------------------<  119    2.9     |  27     |  0.71     Ca    8.7        10 Apr 2020 12:40  Mg     1.7       2020 21:56    TPro  7.6    /  Alb  3.2    /  TBili  0.6    /  DBili  x      /  AST  19     /  ALT  14     /  AlkPhos  72     10 Apr 2020 12:40      Urinalysis Basic - ( 2020 22:04 )    Color: Yellow / Appearance: Slightly Turbid / S.005 / pH: x  Gluc: x / Ketone: Small  / Bili: Negative / Urobili: Negative   Blood: x / Protein: 30 mg/dL / Nitrite: Negative   Leuk Esterase: Trace / RBC: x / WBC 3-5   Sq Epi: x / Non Sq Epi: Few / Bacteria: Moderate      CARDIAC MARKERS ( 2020 21:56 )  <.015 ng/mL / x     / x     / x     / x        Lipase, Serum: 215 U/L (20 @ 21:56)      RADIOLOGY & ADDITIONAL TESTS:    HEALTH ISSUES - PROBLEM Dx:  Advanced directives, counseling/discussion: Advanced directives, counseling/discussion  Hypokalemia: Hypokalemia  Need for prophylactic measure: Need for prophylactic measure  HTN (hypertension): HTN (hypertension)  Metabolic encephalopathy: Metabolic encephalopathy      Consultant(s) Notes Reviewed:  [X  ] YES     Care Discussed with [ x ] Consultants, [ X ] Patient, [  ] Family, [X  ] , [ X ] Social Service, [X  ] RN, [  ] Physical Therapy  DVT PPX: [X  ] Lovenox, [  ] S C Heparin, [  ] Coumadin, [  ] Xarelto, [  ] Eliquis, [  ] Pradaxa, [  ] IV Heparin drip, [  ] SCD, [  ] Contraindication secondary to GI Bleed, [  ] Ambulation  Advanced Directive: [X  ] None, [  ] DNR/DNI

## 2020-04-10 NOTE — SWALLOW BEDSIDE ASSESSMENT ADULT - SWALLOW EVAL: RECOMMENDED FEEDING/EATING TECHNIQUES
position upright (90 degrees)/oral hygiene/crush medication (when feasible)/small sips/bites/alternate food with liquid/allow for swallow between intakes/maintain upright posture during/after eating for 30 mins

## 2020-04-10 NOTE — PROGRESS NOTE ADULT - PROBLEM SELECTOR PLAN 2
-presented with AMS, CT chest with minimal bibasilar dependent atelectasis    -saturating well on RA, COVID NEGATIVE potassium 2.7 on admission, repleted  -now 2.9, repleted again  -follow up BMP in AM, replete further as needed

## 2020-04-11 DIAGNOSIS — I63.9 CEREBRAL INFARCTION, UNSPECIFIED: ICD-10-CM

## 2020-04-11 DIAGNOSIS — R50.9 FEVER, UNSPECIFIED: ICD-10-CM

## 2020-04-11 LAB
ANION GAP SERPL CALC-SCNC: 6 MMOL/L — SIGNIFICANT CHANGE UP (ref 5–17)
APPEARANCE UR: CLEAR — SIGNIFICANT CHANGE UP
BILIRUB UR-MCNC: NEGATIVE — SIGNIFICANT CHANGE UP
BUN SERPL-MCNC: 14 MG/DL — SIGNIFICANT CHANGE UP (ref 7–23)
CALCIUM SERPL-MCNC: 8.6 MG/DL — SIGNIFICANT CHANGE UP (ref 8.5–10.1)
CHLORIDE SERPL-SCNC: 101 MMOL/L — SIGNIFICANT CHANGE UP (ref 96–108)
CHOLEST SERPL-MCNC: 161 MG/DL — SIGNIFICANT CHANGE UP (ref 10–199)
CO2 SERPL-SCNC: 29 MMOL/L — SIGNIFICANT CHANGE UP (ref 22–31)
COLOR SPEC: YELLOW — SIGNIFICANT CHANGE UP
CREAT SERPL-MCNC: 0.81 MG/DL — SIGNIFICANT CHANGE UP (ref 0.5–1.3)
DIFF PNL FLD: ABNORMAL
GLUCOSE SERPL-MCNC: 96 MG/DL — SIGNIFICANT CHANGE UP (ref 70–99)
GLUCOSE UR QL: NEGATIVE — SIGNIFICANT CHANGE UP
HBA1C BLD-MCNC: 6.4 % — HIGH (ref 4–5.6)
HCT VFR BLD CALC: 34.3 % — LOW (ref 34.5–45)
HDLC SERPL-MCNC: 47 MG/DL — LOW
HGB BLD-MCNC: 12 G/DL — SIGNIFICANT CHANGE UP (ref 11.5–15.5)
KETONES UR-MCNC: NEGATIVE — SIGNIFICANT CHANGE UP
LEUKOCYTE ESTERASE UR-ACNC: ABNORMAL
LIPID PNL WITH DIRECT LDL SERPL: 100 MG/DL — SIGNIFICANT CHANGE UP
MAGNESIUM SERPL-MCNC: 2.5 MG/DL — SIGNIFICANT CHANGE UP (ref 1.6–2.6)
MCHC RBC-ENTMCNC: 29.3 PG — SIGNIFICANT CHANGE UP (ref 27–34)
MCHC RBC-ENTMCNC: 35 GM/DL — SIGNIFICANT CHANGE UP (ref 32–36)
MCV RBC AUTO: 83.9 FL — SIGNIFICANT CHANGE UP (ref 80–100)
NITRITE UR-MCNC: NEGATIVE — SIGNIFICANT CHANGE UP
NRBC # BLD: 0 /100 WBCS — SIGNIFICANT CHANGE UP (ref 0–0)
PH UR: 7 — SIGNIFICANT CHANGE UP (ref 5–8)
PLATELET # BLD AUTO: 150 K/UL — SIGNIFICANT CHANGE UP (ref 150–400)
POTASSIUM SERPL-MCNC: 3.3 MMOL/L — LOW (ref 3.5–5.3)
POTASSIUM SERPL-SCNC: 3.3 MMOL/L — LOW (ref 3.5–5.3)
PROT UR-MCNC: 30 MG/DL
RBC # BLD: 4.09 M/UL — SIGNIFICANT CHANGE UP (ref 3.8–5.2)
RBC # FLD: 12.9 % — SIGNIFICANT CHANGE UP (ref 10.3–14.5)
SODIUM SERPL-SCNC: 136 MMOL/L — SIGNIFICANT CHANGE UP (ref 135–145)
SP GR SPEC: 1 — LOW (ref 1.01–1.02)
TOTAL CHOLESTEROL/HDL RATIO MEASUREMENT: 3.5 RATIO — SIGNIFICANT CHANGE UP (ref 3.3–7.1)
TRIGL SERPL-MCNC: 74 MG/DL — SIGNIFICANT CHANGE UP (ref 10–149)
TSH SERPL-MCNC: 1.16 UIU/ML — SIGNIFICANT CHANGE UP (ref 0.36–3.74)
UROBILINOGEN FLD QL: NEGATIVE — SIGNIFICANT CHANGE UP
WBC # BLD: 8.53 K/UL — SIGNIFICANT CHANGE UP (ref 3.8–10.5)
WBC # FLD AUTO: 8.53 K/UL — SIGNIFICANT CHANGE UP (ref 3.8–10.5)

## 2020-04-11 PROCEDURE — 70544 MR ANGIOGRAPHY HEAD W/O DYE: CPT | Mod: 26,59

## 2020-04-11 PROCEDURE — 70498 CT ANGIOGRAPHY NECK: CPT | Mod: 26

## 2020-04-11 PROCEDURE — 70551 MRI BRAIN STEM W/O DYE: CPT | Mod: 26

## 2020-04-11 PROCEDURE — 93306 TTE W/DOPPLER COMPLETE: CPT | Mod: 26

## 2020-04-11 PROCEDURE — 99223 1ST HOSP IP/OBS HIGH 75: CPT

## 2020-04-11 RX ORDER — ATORVASTATIN CALCIUM 80 MG/1
10 TABLET, FILM COATED ORAL AT BEDTIME
Refills: 0 | Status: DISCONTINUED | OUTPATIENT
Start: 2020-04-11 | End: 2020-04-14

## 2020-04-11 RX ORDER — AMLODIPINE BESYLATE 2.5 MG/1
5 TABLET ORAL DAILY
Refills: 0 | Status: DISCONTINUED | OUTPATIENT
Start: 2020-04-11 | End: 2020-04-13

## 2020-04-11 RX ORDER — POTASSIUM CHLORIDE 20 MEQ
40 PACKET (EA) ORAL EVERY 4 HOURS
Refills: 0 | Status: COMPLETED | OUTPATIENT
Start: 2020-04-11 | End: 2020-04-11

## 2020-04-11 RX ORDER — HYDROCHLOROTHIAZIDE 25 MG
25 TABLET ORAL DAILY
Refills: 0 | Status: DISCONTINUED | OUTPATIENT
Start: 2020-04-11 | End: 2020-04-13

## 2020-04-11 RX ORDER — ATORVASTATIN CALCIUM 80 MG/1
1 TABLET, FILM COATED ORAL
Qty: 30 | Refills: 0
Start: 2020-04-11 | End: 2020-05-10

## 2020-04-11 RX ORDER — ATENOLOL 25 MG/1
100 TABLET ORAL DAILY
Refills: 0 | Status: DISCONTINUED | OUTPATIENT
Start: 2020-04-11 | End: 2020-04-13

## 2020-04-11 RX ORDER — FAMOTIDINE 10 MG/ML
20 INJECTION INTRAVENOUS DAILY
Refills: 0 | Status: DISCONTINUED | OUTPATIENT
Start: 2020-04-11 | End: 2020-04-14

## 2020-04-11 RX ORDER — LISINOPRIL 2.5 MG/1
40 TABLET ORAL DAILY
Refills: 0 | Status: DISCONTINUED | OUTPATIENT
Start: 2020-04-11 | End: 2020-04-13

## 2020-04-11 RX ORDER — CLOPIDOGREL BISULFATE 75 MG/1
75 TABLET, FILM COATED ORAL DAILY
Refills: 0 | Status: DISCONTINUED | OUTPATIENT
Start: 2020-04-11 | End: 2020-04-14

## 2020-04-11 RX ADMIN — AMLODIPINE BESYLATE 5 MILLIGRAM(S): 2.5 TABLET ORAL at 05:47

## 2020-04-11 RX ADMIN — FAMOTIDINE 20 MILLIGRAM(S): 10 INJECTION INTRAVENOUS at 22:39

## 2020-04-11 RX ADMIN — ENOXAPARIN SODIUM 40 MILLIGRAM(S): 100 INJECTION SUBCUTANEOUS at 18:03

## 2020-04-11 RX ADMIN — ENOXAPARIN SODIUM 40 MILLIGRAM(S): 100 INJECTION SUBCUTANEOUS at 05:46

## 2020-04-11 RX ADMIN — ATORVASTATIN CALCIUM 10 MILLIGRAM(S): 80 TABLET, FILM COATED ORAL at 22:39

## 2020-04-11 RX ADMIN — ATENOLOL 100 MILLIGRAM(S): 25 TABLET ORAL at 05:46

## 2020-04-11 RX ADMIN — Medication 25 MILLIGRAM(S): at 05:47

## 2020-04-11 RX ADMIN — Medication 81 MILLIGRAM(S): at 14:38

## 2020-04-11 RX ADMIN — CEFTRIAXONE 100 MILLIGRAM(S): 500 INJECTION, POWDER, FOR SOLUTION INTRAMUSCULAR; INTRAVENOUS at 05:46

## 2020-04-11 RX ADMIN — CLOPIDOGREL BISULFATE 75 MILLIGRAM(S): 75 TABLET, FILM COATED ORAL at 22:39

## 2020-04-11 RX ADMIN — LISINOPRIL 40 MILLIGRAM(S): 2.5 TABLET ORAL at 05:47

## 2020-04-11 RX ADMIN — Medication 40 MILLIEQUIVALENT(S): at 18:03

## 2020-04-11 RX ADMIN — Medication 40 MILLIEQUIVALENT(S): at 14:37

## 2020-04-11 NOTE — PROGRESS NOTE ADULT - PROBLEM SELECTOR PLAN 8
Lovenox 40 mg BID   PT eval- no PT needs    IMPROVE VTE Individual Risk Assessment  RISK                                                                Points  [  ] Previous VTE                                                  3  [  ] Thrombophilia                                               2  [  ] Lower limb paralysis                                      2        (unable to hold up >15 seconds)    [ x ] Current Cancer                                              2         (within 6 months)  [  ] Immobilization > 24 hrs                                1  [  ] ICU/CCU stay > 24 hours                              1  [ x ] Age > 60                                                      1  IMPROVE VTE Score: 3    IMPROVE Score 0-1: Low Risk, No VTE prophylaxis required for most patients, encourage ambulation.   IMPROVE Score 2-3: At risk, pharmacologic VTE prophylaxis is indicated for most patients (in the absence of a contraindication)  IMPROVE Score > or = 4: High Risk, pharmacologic VTE prophylaxis is indicated for most patients (in the absence of a contraindication)

## 2020-04-11 NOTE — PROGRESS NOTE ADULT - PROBLEM SELECTOR PLAN 3
patient is on multiple home meds, according to daughter-in-law, patient was not taking potassium chloride tabs every day, last prescribed on 11/6/19 for a 3 month supply  -on amlodipine, atenolol, HCTZ, lisinopril at home, continue with hold parameters potassium 2.7 on admission, repleted  -now 3.3, repleted again  -follow up BMP in AM, replete further as needed

## 2020-04-11 NOTE — PROGRESS NOTE ADULT - PROBLEM SELECTOR PLAN 4
S/P left Breast Lumpectomy. S/P RT   Now on Herceptin Rx q 3 weekly with Dr Esparza 10/12 Rx completed as per Dtr in Law patient is on multiple home meds, according to daughter-in-law, patient was not taking potassium chloride tabs every day, last prescribed on 11/6/19 for a 3 month supply  -on amlodipine, atenolol, HCTZ, lisinopril at home, continue with hold parameters for permissive HTN for now x48 hours

## 2020-04-11 NOTE — PROGRESS NOTE ADULT - ASSESSMENT
85 yo female with PMHx of HTN, breast ca (s/p L lumpectomy 2018, currently on herceptin infusions q3 week, last 2 weeks ago) p/w AMS, admitted for metabolic encephalopathy 2/2 UTI vs. CVA vs. COVID-19, COVID NEGATIVE. 83 yo female with PMHx of HTN, breast ca (s/p L lumpectomy 2018, currently on herceptin infusions q3 week, last 2 weeks ago) p/w AMS, admitted for metabolic encephalopathy 2/2 UTI vs. CVA vs. COVID-19, COVID NEGATIVE.

## 2020-04-11 NOTE — CONSULT NOTE ADULT - SUBJECTIVE AND OBJECTIVE BOX
[facility] - Shanell Martinez, Easton Fink, Florencio, Trino, Ran Flores  Office Number: [phone]    Initial Consult Note  CHIEF COMPLAINT: Patient is a 84y old  Female who presents with a chief complaint of AMS (11 Apr 2020 10:06)    HPI: 85 yo female PMH of HTN, breast ca (s/p L lumpectomy 2018, currently on Herceptin infusions q3 week, last 2 weeks ago) p/w AMS that started suddenly today. History obtained from medical records.  Daughter-in-law found mother yesterday evening noticeably disoriented and did not recognize her daughter-in-law, found speech to be gibberish, pt was repeating same things. She complains of feeling lightheaded and dizzy, and repeatedly stated "how did you know I was here?" Last normal conversation was at 4:30PM 4/10/20. No recent trauma. Patient was complaining of not feeling well last Friday but attributed it to her chemotherapy. According to daughter, patient was complaining of very vague symptomatology, feeling lightheaded, dizzy with headaches, and nausea and diarrhea.   CT Head showed no acute intracranial bleeding, mass effect, or shift. Mild chronic microvascular ischemic changes. CT chest showed clear Lungs apart from minimal bibasilar dependent atelectasis. UA showed trace Leuk esterases, moderate bacteria. EKG showed NSR with L axis deviation. Patient admitted to floors. Saturating in high 90s on room air.  COVID NEGATIVE.  Patient was seen by neurology. 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 Patient spiked fever 101.3F  yesterday COVID testing repeated. Patient is on Rocephin for UTI.     Allergies  No Known Allergies    PAST MEDICAL & SURGICAL HISTORY:  Breast cancer  Hypertension  Hypertension  H/O lumpectomy: left    MEDICATIONS  (STANDING):  amLODIPine   Tablet 5 milliGRAM(s) Oral daily  aspirin enteric coated 81 milliGRAM(s) Oral daily  ATENolol  Tablet 100 milliGRAM(s) Oral daily  atorvastatin 10 milliGRAM(s) Oral at bedtime  cefTRIAXone   IVPB 1000 milliGRAM(s) IV Intermittent every 24 hours  enoxaparin Injectable 40 milliGRAM(s) SubCutaneous two times a day  hydrochlorothiazide 25 milliGRAM(s) Oral daily  lisinopril 40 milliGRAM(s) Oral daily  potassium chloride    Tablet ER 40 milliEquivalent(s) Oral every 4 hours    MEDICATIONS  (PRN):  acetaminophen   Tablet .. 650 milliGRAM(s) Oral every 4 hours PRN Temp greater or equal to 38.5C (101.3F)    FAMILY HISTORY:  No pertinent family history in first degree relatives    SOCIAL HISTORY    Marital Status:   Occupation:   Lives with:     SUBSTANCE USE  Tobacco Usage:  ( ) None ( ) never smoked   ( ) former smoker  ( ) current smoker; Packs per day:   Alcohol Usage: ( ) none  ( ) occasional ( ) 2-3 times a week ( ) daily; Last drink:   Recreational drugs ( ) None    REVIEW OF SYSTEMS   CONSTITUTIONAL: No fevers, No chills, No fatigue, No weight gain  EYES: No vision changes, No vertigo, No throat pain   ENT: No congestion, No ear pain, No sore throat.  NECK: No pain, No stiffness  RESPIRATORY: No shortness of breath, No cough, No wheezing, No hemoptysis  CARDIOVASCULAR: No chest pain. No palpitations, No MAHAN, No orthopnea, No PND, No pleuritic pain  GASTROINTESTINAL: No abdominal pain, No nausea, No vomiting, No hematemesis, No diarrhea No constipation. No melena  GENITOURINARY: No dysuria, No frequency, No incontinence, No hematuria  NEUROLOGICAL: No dizziness, No lightheadedness, No syncope, No LOC, No headache, No numbness, No weakness  MUSCULOSKELETAL: No joint pain, No joint swelling.  PSYCHIATRIC: No anxiety, No depression  DERMATOLOGY: No diaphoresis. No itching, No rashes, No pressure ulcers  HEME/LYMPH: No easy bruising, or bleeding gums  All other review of systems is negative unless indicated above.    VITAL SIGNS:   Vital Signs Last 24 Hrs  T(C): 36.9 (11 Apr 2020 05:14), Max: 38.5 (10 Apr 2020 18:03)  T(F): 98.4 (11 Apr 2020 05:14), Max: 101.3 (10 Apr 2020 18:03)  HR: 66 (11 Apr 2020 05:14) (60 - 86)  BP: 116/69 (11 Apr 2020 05:14) (116/69 - 188/74)  BP(mean): --  RR: 18 (11 Apr 2020 05:14) (18 - 19)  SpO2: 94% (11 Apr 2020 05:14) (94% - 96%)    Physical Exam:  Appearance: NAD, no distress, alert,   HEENT: Moist Mucous Membranes, Anicteric  Cardiovascular: Regular rate and rhythm, Normal S1 S2, No JVD, No murmurs, No rubs, gallops or clicks  Respiratory: Lungs clear to auscultation. No rales, No rhonchi, No wheezing. No tenderness to palpation  Gastrointestinal:  Soft, Non-tender, + BS  Neurologic: Non-focal, A&Ox3  Skin: Warm and dry, No rashes, No ecchymosis, No cyanosis, No ulcers   Musculoskeletal: No clubbing, No cyanosis  Vascular: Peripheral pulses palpable 2+ bilaterally  Psychiatry: Mood & affect appropriate  Lymph: No peripheral edema.     I&O's Summary    10 Apr 2020 07:01  -  11 Apr 2020 07:00  --------------------------------------------------------  IN: 300 mL / OUT: 0 mL / NET: 300 mL    LABS: All Labs Reviewed:                        12.0   8.53  )-----------( 150      ( 11 Apr 2020 06:07 )             34.3                         13.0   9.60  )-----------( 153      ( 10 Apr 2020 12:40 )             36.6                         13.6   6.69  )-----------( 154      ( 09 Apr 2020 21:56 )             39.5     11 Apr 2020 06:07    136    |  101    |  14     ----------------------------<  96     3.3     |  29     |  0.81   10 Apr 2020 12:40    135    |  99     |  12     ----------------------------<  119    2.9     |  27     |  0.71   09 Apr 2020 21:56    136    |  100    |  14     ----------------------------<  153    2.7     |  27     |  0.72     Ca    8.6        11 Apr 2020 06:07  Ca    8.7        10 Apr 2020 12:40  Ca    8.9        09 Apr 2020 21:56  Mg     2.5       11 Apr 2020 06:07  Mg     1.7       09 Apr 2020 21:56    TPro  7.6    /  Alb  3.2    /  TBili  0.6    /  DBili  x      /  AST  19     /  ALT  14     /  AlkPhos  72     10 Apr 2020 12:40  TPro  8.6    /  Alb  3.6    /  TBili  0.4    /  DBili  x      /  AST  21     /  ALT  18     /  AlkPhos  86     09 Apr 2020 21:56    Troponin I, Serum: <.015 ng/mL (04-09-20 @ 21:56)    Blood Culture: Organism --  Gram Stain Blood -- Gram Stain --  Specimen Source .Blood Blood-Venous  Culture-Blood --    Thyroid Stimulating Hormone, Serum: 1.16 uIU/mL (04-11-20 @ 06:07)    OTHER TEST RESULTS   < from: 12 Lead ECG (04.10.20 @ 10:39) >  Ventricular Rate 62 BPM  Atrial Rate 62 BPM  P-R Interval 170 ms  QRS Duration 86 ms  Q-T Interval 408 ms  QTC Calculation(Bezet) 414 ms  P Axis 71 degrees  R Axis -49 degrees  T Axis -22 degrees  Diagnosis Line Normal sinus rhythm  Left axis deviation  T wave abnormality, consider anterolateral ischemia  Abnormal ECG  When compared with ECG of 09-APR-2020 20:40, (Unconfirmed)  Inverted T waves have replaced nonspecific T wave abnormality in Lateral leads  QT has shortened  Confirmed by Oskar Fink MD ([id number]) on 4/10/2020 1:47:57 PM    < from: Xray Chest 1 View-PORTABLE IMMEDIATE (04.09.20 @ 22:08) >  FINDINGS:   The examination is limited by single view portable technique and patient rotation..  The lungs are  grossly clear.  No evidence of a pleural effusion or pneumothorax.   The cardiomediastinal silhouette is suboptimally assessed due to patient rotation.  No acute bony or soft tissue abnormality is recognized.    IMPRESSION:  No radiographic evidence of pneumonia.    < end of copied text >      < from: CT Chest No Cont (04.10.20 @ 01:42) >  FINDINGS:  LUNGS AND AIRWAYS: Patent central airways.  Minimal bibasilar dependent atelectasis. No pulmonary consolidation or infiltrates.  PLEURA: No pleural effusion.  MEDIASTINUM AND SHASHI: No lymphadenopathy.  VESSELS: Within normal limits.  HEART: Heart size is mildly enlarged. No pericardial effusion.  CHEST WALL AND LOWER NECK: 2 cm right thyroid lobe hypodense nodule.  VISUALIZED UPPER ABDOMEN: Small hiatal hernia. Colonic diverticuli.  BONES: Degenerative changes with minimal retrolisthesis of L1 on L2.    IMPRESSION:  Clear Lungs apart from minimal bibasilar dependent atelectasis.    < end of copied text >    < from: MR Angio Head No Cont (04.11.20 @ 12:09) > There is no flow disturbance that would imply stenosis or aneurysm on either side. The anterior, posterior and middle cerebral circulation appears roughly symmetric. Both posterior cerebral arteries are fed from the basilar.    Impression:  Normal study.    < end of copied text >  < from: MR Head No Cont (04.11.20 @ 12:06) >  There or multiple tiny foci of edema and matching diffusion restriction involving the deep and peripheral cortical white matter of the cerebellar hemispheres on either side. There is a single small focus involving the medial right thalamus. There is no associated mass effect or hemorrhage. There is mild to moderate chronic microvascular ischemic change in the periventricular white matter. Overall volume is maintained. There is no hydrocephalus. The orbital and sellar contents and cerebellar tonsils are within normal limits.    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 Gray-white differentiation is maintained.  Mild chronic microvascular ischemic changes are noted.  Ventricles, sulci, and cisterns are normal in size for the patient's age without hydrocephalus. Basal cisterns are patent.   Visualized paranasal sinuses  and mastoid air cells are clear. Calvarium is intact. Mild hyperostosis frontalis interna.    IMPRESSION:  No acute intracranial bleeding, mass effect, or shift. Mild chronic microvascular ischemic changes.     < end of copied text > [facility] - Shanell Martinez, Easton Fink, Florencio, Trino, Ran Flores  Office Number: [phone]    Initial Consult Note  CHIEF COMPLAINT: Patient is a 84y old  Female who presents with a chief complaint of AMS (11 Apr 2020 10:06)    HPI: 83 yo female PMH of HTN, breast ca (s/p L lumpectomy 2018, currently on Herceptin infusions q3 week, last 2 weeks ago) p/w AMS that started suddenly today. History obtained from medical records.  Daughter-in-law found mother yesterday evening noticeably disoriented and did not recognize her daughter-in-law, found speech to be gibberish, pt was repeating same things. She complains of feeling lightheaded and dizzy, and repeatedly stated "how did you know I was here?" Last normal conversation was at 4:30PM 4/10/20. No recent trauma. Patient was complaining of not feeling well last Friday but attributed it to her chemotherapy. According to daughter, patient was complaining of very vague symptomatology, feeling lightheaded, dizzy with headaches, and nausea and diarrhea.   CT Head showed no acute intracranial bleeding, mass effect, or shift. Mild chronic microvascular ischemic changes. CT chest showed clear Lungs apart from minimal bibasilar dependent atelectasis. UA showed trace Leuk esterases, moderate bacteria. EKG showed NSR with L axis deviation. Patient admitted to floors. Saturating in high 90s on room air.  COVID NEGATIVE.  Patient was seen by neurology. 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 Patient spiked fever 101.3F  yesterday COVID testing repeated. Patient is on Rocephin for UTI.     Allergies  No Known Allergies    PAST MEDICAL & SURGICAL HISTORY:  Breast cancer  Hypertension  Hypertension  H/O lumpectomy: left    MEDICATIONS  (STANDING):  amLODIPine   Tablet 5 milliGRAM(s) Oral daily  aspirin enteric coated 81 milliGRAM(s) Oral daily  ATENolol  Tablet 100 milliGRAM(s) Oral daily  atorvastatin 10 milliGRAM(s) Oral at bedtime  cefTRIAXone   IVPB 1000 milliGRAM(s) IV Intermittent every 24 hours  enoxaparin Injectable 40 milliGRAM(s) SubCutaneous two times a day  hydrochlorothiazide 25 milliGRAM(s) Oral daily  lisinopril 40 milliGRAM(s) Oral daily  potassium chloride    Tablet ER 40 milliEquivalent(s) Oral every 4 hours    MEDICATIONS  (PRN):  acetaminophen   Tablet .. 650 milliGRAM(s) Oral every 4 hours PRN Temp greater or equal to 38.5C (101.3F)    FAMILY HISTORY:  No pertinent family history in first degree relatives    SOCIAL HISTORY  , lives alone. ADLs independent, Walks independently, never smoker, denies alcohol use, denies recreational drug use. Flu vaccine up to date.    REVIEW OF SYSTEMS   CONSTITUTIONAL: No fevers, No chills, No fatigue, No weight gain  EYES: No vision changes, No vertigo, No throat pain   ENT: No congestion, No ear pain, No sore throat.  NECK: No pain, No stiffness  RESPIRATORY: No shortness of breath, No cough, No wheezing, No hemoptysis  CARDIOVASCULAR: No chest pain. No palpitations, No MAHAN, No orthopnea, No PND, No pleuritic pain  GASTROINTESTINAL: No abdominal pain, No nausea, No vomiting, No hematemesis, No diarrhea No constipation. No melena  GENITOURINARY: No dysuria, No frequency, No incontinence, No hematuria  NEUROLOGICAL: No dizziness, No lightheadedness, No syncope, No LOC, No headache, No numbness, No weakness  MUSCULOSKELETAL: No joint pain, No joint swelling.  PSYCHIATRIC: No anxiety, No depression  DERMATOLOGY: No diaphoresis. No itching, No rashes, No pressure ulcers  HEME/LYMPH: No easy bruising, or bleeding gums  All other review of systems is negative unless indicated above.    VITAL SIGNS:   Vital Signs Last 24 Hrs  T(C): 36.9 (11 Apr 2020 05:14), Max: 38.5 (10 Apr 2020 18:03)  T(F): 98.4 (11 Apr 2020 05:14), Max: 101.3 (10 Apr 2020 18:03)  HR: 66 (11 Apr 2020 05:14) (60 - 86)  BP: 116/69 (11 Apr 2020 05:14) (116/69 - 188/74)  BP(mean): --  RR: 18 (11 Apr 2020 05:14) (18 - 19)  SpO2: 94% (11 Apr 2020 05:14) (94% - 96%)    Physical Exam:  Appearance: NAD, no distress, alert, oriented x 3  HEENT: Moist Mucous Membranes, Anicteric  Cardiovascular: Regular rate and rhythm, Normal S1 S2, No JVD, No murmurs, No rubs, gallops or clicks  Respiratory: Lungs clear to auscultation. No rales, No rhonchi, No wheezing. No tenderness to palpation  Gastrointestinal:  Soft, Non-tender, + BS  Neurologic: Non-focal, A&Ox3  Skin: Warm and dry, No rashes, No ecchymosis, No cyanosis, No ulcers   Musculoskeletal: No clubbing, No cyanosis  Vascular: Peripheral pulses palpable 2+ bilaterally  Psychiatry: Mood & affect appropriate  Lymph: No peripheral edema.     I&O's Summary    10 Apr 2020 07:01  -  11 Apr 2020 07:00  --------------------------------------------------------  IN: 300 mL / OUT: 0 mL / NET: 300 mL    LABS: All Labs Reviewed:                        12.0   8.53  )-----------( 150      ( 11 Apr 2020 06:07 )             34.3                         13.0   9.60  )-----------( 153      ( 10 Apr 2020 12:40 )             36.6                         13.6   6.69  )-----------( 154      ( 09 Apr 2020 21:56 )             39.5     11 Apr 2020 06:07    136    |  101    |  14     ----------------------------<  96     3.3     |  29     |  0.81   10 Apr 2020 12:40    135    |  99     |  12     ----------------------------<  119    2.9     |  27     |  0.71   09 Apr 2020 21:56    136    |  100    |  14     ----------------------------<  153    2.7     |  27     |  0.72     Ca    8.6        11 Apr 2020 06:07  Ca    8.7        10 Apr 2020 12:40  Ca    8.9        09 Apr 2020 21:56  Mg     2.5       11 Apr 2020 06:07  Mg     1.7       09 Apr 2020 21:56    TPro  7.6    /  Alb  3.2    /  TBili  0.6    /  DBili  x      /  AST  19     /  ALT  14     /  AlkPhos  72     10 Apr 2020 12:40  TPro  8.6    /  Alb  3.6    /  TBili  0.4    /  DBili  x      /  AST  21     /  ALT  18     /  AlkPhos  86     09 Apr 2020 21:56    Troponin I, Serum: <.015 ng/mL (04-09-20 @ 21:56)    Blood Culture: Organism --  Gram Stain Blood -- Gram Stain --  Specimen Source .Blood Blood-Venous  Culture-Blood --    Thyroid Stimulating Hormone, Serum: 1.16 uIU/mL (04-11-20 @ 06:07)    OTHER TEST RESULTS   < from: 12 Lead ECG (04.10.20 @ 10:39) >  Ventricular Rate 62 BPM  Atrial Rate 62 BPM  P-R Interval 170 ms  QRS Duration 86 ms  Q-T Interval 408 ms  QTC Calculation(Bezet) 414 ms  P Axis 71 degrees  R Axis -49 degrees  T Axis -22 degrees  Diagnosis Line Normal sinus rhythm  Left axis deviation  T wave abnormality, consider anterolateral ischemia  Abnormal ECG  When compared with ECG of 09-APR-2020 20:40, (Unconfirmed)  Inverted T waves have replaced nonspecific T wave abnormality in Lateral leads  QT has shortened  Confirmed by Oskar Fink MD ([id number]) on 4/10/2020 1:47:57 PM    < from: Xray Chest 1 View-PORTABLE IMMEDIATE (04.09.20 @ 22:08) >  FINDINGS:   The examination is limited by single view portable technique and patient rotation..  The lungs are  grossly clear.  No evidence of a pleural effusion or pneumothorax.   The cardiomediastinal silhouette is suboptimally assessed due to patient rotation.  No acute bony or soft tissue abnormality is recognized.    IMPRESSION:  No radiographic evidence of pneumonia.    < end of copied text >      < from: CT Chest No Cont (04.10.20 @ 01:42) >  FINDINGS:  LUNGS AND AIRWAYS: Patent central airways.  Minimal bibasilar dependent atelectasis. No pulmonary consolidation or infiltrates.  PLEURA: No pleural effusion.  MEDIASTINUM AND SHASHI: No lymphadenopathy.  VESSELS: Within normal limits.  HEART: Heart size is mildly enlarged. No pericardial effusion.  CHEST WALL AND LOWER NECK: 2 cm right thyroid lobe hypodense nodule.  VISUALIZED UPPER ABDOMEN: Small hiatal hernia. Colonic diverticuli.  BONES: Degenerative changes with minimal retrolisthesis of L1 on L2.    IMPRESSION:  Clear Lungs apart from minimal bibasilar dependent atelectasis.    < end of copied text >    < from: MR Angio Head No Cont (04.11.20 @ 12:09) > There is no flow disturbance that would imply stenosis or aneurysm on either side. The anterior, posterior and middle cerebral circulation appears roughly symmetric. Both posterior cerebral arteries are fed from the basilar.    Impression:  Normal study.    < end of copied text >  < from: MR Head No Cont (04.11.20 @ 12:06) >  There or multiple tiny foci of edema and matching diffusion restriction involving the deep and peripheral cortical white matter of the cerebellar hemispheres on either side. There is a single small focus involving the medial right thalamus. There is no associated mass effect or hemorrhage. There is mild to moderate chronic microvascular ischemic change in the periventricular white matter. Overall volume is maintained. There is no hydrocephalus. The orbital and sellar contents and cerebellar tonsils are within normal limits.    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 Gray-white differentiation is maintained.  Mild chronic microvascular ischemic changes are noted.  Ventricles, sulci, and cisterns are normal in size for the patient's age without hydrocephalus. Basal cisterns are patent.   Visualized paranasal sinuses  and mastoid air cells are clear. Calvarium is intact. Mild hyperostosis frontalis interna.    IMPRESSION:  No acute intracranial bleeding, mass effect, or shift. Mild chronic microvascular ischemic changes.     < end of copied text >

## 2020-04-11 NOTE — CONSULT NOTE ADULT - PROBLEM SELECTOR RECOMMENDATION 9
1 isolated fever  blood cx neg  no pyuria  no urine cx sent-- but pt not complaining of any gu symptoms now.  but pt per family-- was complaining of pessary discomfort  can complete 3-5 days of ceftriaxone  covid 19 neg  no s/s of pneumonia

## 2020-04-11 NOTE — PROGRESS NOTE ADULT - PROBLEM SELECTOR PLAN 5
NO COVID -19 infection   --presented with AMS, CT chest with minimal bibasilar dependent atelectasis    -saturating well on RA, COVID NEGATIVE S/P left Breast Lumpectomy. S/P RT   Now on Herceptin Rx q 3 weekly with Dr Esparza 10/12 Rx completed as per Dtr in Law

## 2020-04-11 NOTE — CONSULT NOTE ADULT - SUBJECTIVE AND OBJECTIVE BOX
[facility], Division of Infectious Diseases  JUS Whitney  [phone]    PREET CHUA  84y, Female  [id number]      HPI: Now alert and oriented times 3  83 yo female with PMHx of HTN, breast ca (s/p L lumpectomy 2018, currently on herceptin infusions q3 week, last 2 weeks ago) p/w AMS that started suddenly today.  Pt currently states she was anxious and decided to come to the hospital.  Does not have cough, dyspnea, headache, nausea, vomiting, dysuria or diarrhea.  Pt states she lives alone with a tenant.  And they go back and forth.      per chart  Patient was confused so daughter-in-law and son were called. Daughter-in-law states that around 7PM earlier today, when she was dropping off some food, when her mother-in-law answered the door she was noticeably disoriented and did not recognize her daughter-in-law.  Her speech was gibberish, pt was repeating same things, She complains of feeling lightheaded and dizzy, and repeatedly stated "how did you know I was here?" Last normal conversation was at 4:30PM. No recent trauma. Patient was complaining of not feeling well last Friday but attributed it to her chemotherapy. According to daughter, patient was complaining of very vague symptomatology, feeling lightheaded, dizzy with headaches, and nausea and diarrhea. Of note, last echo 3 months ago was normal, and patient has been wearing a pessary for the past few years, complaining of occasional discomfort but no acute changes recently.          PMH/PSH--  Breast cancer  Hypertension  H/O lumpectomy      Allergies--NKDA      Medications--  Antibiotics: cefTRIAXone   IVPB 1000 milliGRAM(s) IV Intermittent every 24 hours    Immunologic:   Other: acetaminophen   Tablet .. PRN  amLODIPine   Tablet  aspirin enteric coated  ATENolol  Tablet  atorvastatin  enoxaparin Injectable  hydrochlorothiazide  lisinopril  potassium chloride    Tablet ER      Social History--  EtOH: denies ***  Tobacco: denies ***  Drug Use: denies ***    Family/Marital History--      Remainder not relevant to clinical concern.    Travel/Environmental/Occupational History:  homemaker  born in [place]    Review of Systems:  A >=10-point review of systems was obtained.     Pertinent positives and negatives--  Constitutional: No fevers. No Chills. No Rigors.   Eyes: no blurry vision  ENMT: no sore throat  Cardiovascular: No chest pain. No palpitations.  Respiratory: No shortness of breath. No cough.  Gastrointestinal: No nausea or vomiting. No diarrhea or constipation.   Genitourinary: no dysuria  Musculoskeletal: no myalgia  Skin: no rash  Neurologic: no headache  Psychiatric: Pleasant. Appropriate affect.    Review of systems otherwise negative except as previously noted.    Physical Exam--  Vital Signs: T(F): 98.4 (04-11-20 @ 05:14), Max: 101.3 (04-10-20 @ 18:03)  HR: 66 (04-11-20 @ 05:14)  BP: 116/69 (04-11-20 @ 05:14)  RR: 18 (04-11-20 @ 05:14)  SpO2: 94% (04-11-20 @ 05:14)  Wt(kg): --  General: Nontoxic-appearing Female in no acute distress.  HEENT: AT/N Anicteric. dentures  Neck: Not rigid. No sense of mass.  Nodes: None palpable.  Lungs: Clear bilaterally without rales, wheezing or rhonchi  Heart: s1s2   Abdomen: Bowel sounds present and normoactive. Soft. Nondistended. Nontender.   Back: No spinal tenderness. No costovertebral angle tenderness.   Extremities: No cyanosis or clubbing. No edema.   Skin: Warm. Dry. Good turgor. No rash. No vasculitic stigmata.  Psychiatric: Appropriate affect and mood for situation.         Laboratory & Imaging Data--  CBC                        12.0   8.53  )-----------( 150      ( 11 Apr 2020 06:07 )             34.3       Chemistries  04-11    136  |  101  |  14  ----------------------------<  96  3.3<L>   |  29  |  0.81    Ca    8.6      11 Apr 2020 06:07  Mg     2.5     04-11    TPro  7.6  /  Alb  3.2<L>  /  TBili  0.6  /  DBili  x   /  AST  19  /  ALT  14  /  AlkPhos  72  04-10      Culture Data    Culture - Blood (collected 10 Apr 2020 00:22)  Source: .Blood Blood-Venous  Preliminary Report (11 Apr 2020 01:03):    No growth to date.    Culture - Blood (collected 10 Apr 2020 00:22)  Source: .Blood Blood-Venous  Preliminary Report (11 Apr 2020 01:03):    No growth to date.      Urine Microscopic-Add On (NC) (04.09.20 @ 22:04)    White Blood Cell - Urine: 3-5    Bacteria: Moderate    Epithelial Cells: Few    < from: CT Chest No Cont (04.10.20 @ 01:42) >    EXAM:  CT CHEST                            PROCEDURE DATE:  04/10/2020          INTERPRETATION:  CLINICAL INFORMATION: Altered mental status.    COMPARISON: None.    PROCEDURE:   CT of the Chest was performed without intravenous contrast.  [place] coronal reformats were performed.      FINDINGS:    LUNGS AND AIRWAYS: Patent central airways.  Minimal bibasilar dependent atelectasis. No pulmonary consolidation or infiltrates.    PLEURA: No pleural effusion.    MEDIASTINUM AND SHASHI: No lymphadenopathy.    VESSELS: Within normal limits.    HEART: Heart size is mildly enlarged. No pericardial effusion.    CHEST WALL AND LOWER NECK: 2 cm right thyroid lobe hypodense nodule.    VISUALIZED UPPER ABDOMEN: Small hiatal hernia. Colonic diverticuli.    BONES: Degenerative changes with minimal retrolisthesis of L1 on L2.    IMPRESSION:     Clear Lungs apart from minimal bibasilar dependent atelectasis.    < end of copied text >    < from: MR Head No Cont (04.11.20 @ 12:06) >    EXAM:  MR BRAIN                            PROCEDURE DATE:  04/11/2020          INTERPRETATION:  MRI brain without contrast    History altered mental status    Comparison CT performed the prior day    There or multiple tiny foci of edema and matching diffusion restriction involving the deep and peripheral cortical white matter of the cerebellar hemispheres on either side. There is a single small focus involving the medial right thalamus. There is no associated mass effect or hemorrhage. There is mild to moderate chronic microvascular ischemic change in the periventricular white matter. Overall volume is maintained. There is no hydrocephalus. The orbital and sellar contents and cerebellar tonsils are within normal limits.    IMPRESSION:    Multiple tiny acute bland posterior distribution infarcts as above        < end of copied text >      < from: MR Angio Head No Cont (04.11.20 @ 12:09) >    EXAM:  MR ANGIO BRAIN                            PROCEDURE DATE:  04/11/2020          INTERPRETATION:  MRA head    History: CVA    Technique 3-D TOF with 3D MIPs    There is no flow disturbance that would imply stenosis or aneurysm on either side. The anterior, posterior and middle cerebral circulation appears roughly symmetric. Both posterior cerebral arteries are fed from the basilar.    Impression:  Normal study.      < end of copied text >          COVID-19 PCR . (04.10.20 @ 00:55)    COVID-19 PCR: NotDetec: This test has been validated by [employer] to be accurate;  though it has not been FDA cleared/approved by the usual pathway.  As with all laboratory tests, results should be correlated with clinical  findings.  https://www.fda.gov/media/885978/download  https://www.fda.gov/media/153436/download

## 2020-04-11 NOTE — CONSULT NOTE ADULT - ASSESSMENT
85 yo female with PMHx of HTN, breast ca (s/p L lumpectomy 2018, currently on herceptin infusions q3 week, last 2 weeks ago) p/w AMS, admitted for metabolic encephalopathy 2/2 UTI vs. CVA vs. COVID-19, COVID NEGATIVE.    Cerebral vascular accident  - MRI shows multiple tiny acute bland posterior distribution infarcts   - Follow neurology recs  - Permissive HTN as per neurology   - Continue with aspirin  - Continue with Lipitor  - Obtain echo with bubble study to r/o embolic etiology   - Consider remote tele for 24 hours to assess for any occult arrhythmias   - ECHO from 10/2019 showed MAC, min MR, Mod dil LA, EF 70%, Mild diastolic dysfunction, mild pulm HTN  - Had normal stress 12/2018    Hypertension  - Patient on amlodipine, atenolol, HCTZ, lisinopril at home  - Continue with hold parameters for permissive HTN for now x48 hours.    R/O SARS-associated coronavirus infection  - Saturating well on RA, COVID NEGATIVE.  - Repeat COVID pending     DVT prophylaxis   - Lovenox 40 mg BID per primary       - Monitor and replete lytes, keep K>4, Mg>2.  - All other medical needs as per primary team.  - Other cardiovascular workup will depend on clinical course.  - Will continue to follow.      Vignesh Willis, MS FNP, [facility]  Nurse Practitioner- Cardiology   Spectra #[phone] 85 yo female with PMHx of HTN, breast ca (s/p L lumpectomy 2018, currently on herceptin infusions q3 week, last 2 weeks ago) p/w AMS, admitted for UTI vs COVID-19 vs CVA. COVID NEGATIVE.    Cerebral vascular accident  - MRI shows multiple tiny acute bland posterior distribution infarcts   - Follow neurology recs  - Permissive HTN as per neurology   - Continue with aspirin  - Continue with Lipitor  - Obtain echo with bubble study to r/o embolic etiology and also to assess for valvular vegetation   - Consider remote tele for 24 hours to assess for any occult arrhythmias   - ECHO from 10/2019 showed MAC, min MR, Mod dil LA, EF 70%, Mild diastolic dysfunction, mild pulm HTN  - EKG showed T wave flattening initially which is now resolving. Repeat EKG showed NSR @ 62. TWI V1-V6  - Had normal stress 12/2018    Hypertension  - Patient on amlodipine, atenolol, HCTZ, lisinopril at home  - Continue with hold parameters for permissive HTN for now x48 hours.    R/O SARS-associated coronavirus infection  - Saturating well on RA, COVID NEGATIVE.  - Repeat COVID pending     Hypokalemia   - Monitor and replete lytes, keep K>4, Mg>2.  - EKG shows T wave flattening initially which is improving     DVT prophylaxis   - Lovenox 40 mg BID per primary     - Monitor and replete lytes, keep K>4, Mg>2.  - All other medical needs as per primary team.  - Other cardiovascular workup will depend on clinical course.  - Will continue to follow.      Vignesh Willis, MS FNP, [facility]  Nurse Practitioner- Cardiology   Spectra #[phone] 83 yo female with PMHx of HTN, breast ca (s/p L lumpectomy 2018, currently on herceptin infusions q3 week, last 2 weeks ago) p/w AMS, admitted for UTI vs COVID-19 vs CVA. COVID NEGATIVE.    Cerebral vascular accident  - MRI shows multiple tiny acute bland posterior distribution infarcts   - Follow neurology recs  - Permissive HTN as per neurology   - Continue with aspirin  - Continue with Lipitor  - Obtain echo with bubble study to r/o embolic etiology and also to assess for valvular vegetation   - Patient spiked fever, blood c/n NGD   - Consider remote tele for 24 hours to assess for any occult arrhythmias   - ECHO from 10/2019 showed MAC, min MR, Mod dil LA, EF 70%, Mild diastolic dysfunction, mild pulm HTN  - EKG showed T wave flattening initially which is now resolving. Repeat EKG showed NSR @ 62. TWI V1-V6  - Had normal stress 12/2018    Hypertension  - Patient on amlodipine, atenolol, HCTZ, lisinopril at home  - Continue with hold parameters for permissive HTN for now x48 hours.    R/O SARS-associated coronavirus infection  - Saturating well on RA, COVID NEGATIVE.  - Repeat COVID pending     Hypokalemia   - Monitor and replete lytes, keep K>4, Mg>2.  - EKG shows T wave flattening initially which is improving     DVT prophylaxis   - Lovenox 40 mg BID per primary     - Monitor and replete lytes, keep K>4, Mg>2.  - All other medical needs as per primary team.  - Other cardiovascular workup will depend on clinical course.  - Will continue to follow.      Vignesh Willis, MS FNP, [facility]  Nurse Practitioner- Cardiology   Spectra #[phone] 85 yo female with PMHx of HTN, breast ca (s/p L lumpectomy 2018, currently on herceptin infusions q3 week, last 2 weeks ago) p/w AMS, admitted for UTI vs COVID-19 vs CVA. COVID NEGATIVE.    Cerebral vascular accident  - MRI shows multiple tiny acute bland posterior distribution infarcts   - Follow neurology recs  - Permissive HTN as per neurology   - Continue with aspirin  - Continue with Lipitor  - Obtain echo with bubble study to r/o embolic etiology and also to assess for valvular issues  - Patient spiked fever, blood c/n NGD. Will defer [facility] for now   - Consider remote tele for 24 hours to assess for any occult arrhythmias   - ECHO from 10/2019 showed MAC, min MR, Mod dil LA, EF 70%, Mild diastolic dysfunction, mild pulm HTN  - EKG showed T wave flattening initially which is now resolving. Repeat EKG showed NSR @ 62. TWI V1-V6  - Had normal stress 12/2018    Hypertension  - Patient on amlodipine, atenolol, HCTZ, lisinopril at home  - Continue with hold parameters for permissive HTN for now x48 hours.    R/O SARS-associated coronavirus infection  - Saturating well on RA, COVID NEGATIVE.  - Repeat COVID pending     Hypokalemia   - Monitor and replete lytes, keep K>4, Mg>2.  - EKG shows T wave flattening initially which is improving     DVT prophylaxis   - Lovenox 40 mg BID per primary     - Monitor and replete lytes, keep K>4, Mg>2.  - All other medical needs as per primary team.  - Other cardiovascular workup will depend on clinical course.  - Will continue to follow.      Vignesh Willis, MS FNP, [facility]  Nurse Practitioner- Cardiology   Spectra #[phone] 85 yo female with PMHx of HTN, breast ca (s/p L lumpectomy 2018, currently on herceptin infusions q3 week, last 2 weeks ago) p/w AMS, admitted for UTI vs COVID-19 vs CVA. COVID NEGATIVE.    Cerebral vascular accident  - MRI shows multiple tiny acute bland posterior distribution infarcts   - Follow neurology recs  - Permissive HTN as per neurology   - Continue with aspirin  - Continue with Lipitor  - Obtain echo to assess for valvular abn   - Patient spiked fever, blood c/n NGD. Will defer [facility] for now   - Consider remote tele for 24 hours to assess for any occult arrhythmias   - ECHO from 10/2019 showed MAC, min MR, Mod dil LA, EF 70%, Mild diastolic dysfunction, mild pulm HTN  - EKG showed T wave flattening initially which is now resolving. Repeat EKG showed NSR @ 62. TWI V1-V6  - Had normal stress 12/2018    Hypertension  - Patient on amlodipine, atenolol, HCTZ, lisinopril at home  - Continue with hold parameters for permissive HTN for now x48 hours.    R/O SARS-associated coronavirus infection  - Saturating well on RA, COVID NEGATIVE.  - Repeat COVID pending     Hypokalemia   - Monitor and replete lytes, keep K>4, Mg>2.  - EKG shows T wave flattening initially which is improving     DVT prophylaxis   - Lovenox 40 mg BID per primary     - Monitor and replete lytes, keep K>4, Mg>2.  - All other medical needs as per primary team.  - Other cardiovascular workup will depend on clinical course.  - Will continue to follow.      Vignesh Willis, MS FNP, [facility]  Nurse Practitioner- Cardiology   Spectra #[phone]

## 2020-04-11 NOTE — PROGRESS NOTE ADULT - PROBLEM SELECTOR PLAN 2
potassium 2.7 on admission, repleted  -now 3.3, repleted again  -follow up BMP in AM, replete further as needed -likely due to CVA however also r/o UTI vs r/o COVID (2nd testing)  -COVID NEGATIVE first time, however in setting of new fever and already on abx will get repeat COVID testing, contact precautions  -On rocephin for  r/o UTI-Doubt UTI , BCx NGTD, febrile last night  -repeat UA and UCx ordered, f/u results  -f/u repeat COVID testing  -currently saturating well on RA  -ID, Dr. Randhawa, consulted -likely due to CVA however also r/o UTI vs r/o COVID (2nd testing)  -COVID NEGATIVE first time, however in setting of new fever and already on abx will get repeat COVID testing, contact precautions  -On rocephin for  r/o UTI-Doubt UTI , BCx NGTD, febrile last night continue for 3-5 days as per ID  -repeat UA and UCx ordered, f/u results  -f/u repeat COVID testing  -currently saturating well on RA  -ID, Dr. Randhawa, consulted -likely due to CVA however also r/o UTI vs r/o COVID (2nd testing)  -COVID NEGATIVE first time, however in setting of new fever and already on abx will get repeat COVID testing, contact precautions  -On rocephin for  r/o UTI-Doubt UTI , BCx NGTD, febrile last night continue for 3-5 days as per ID  -repeat UA and UCx ordered, f/u results  -f/u repeat COVID testing  -currently saturating well on RA  -ID, Dr. Nair consulted

## 2020-04-11 NOTE — PROGRESS NOTE ADULT - ATTENDING COMMENTS
Pt seen, examined, case & care plan d/w pt, residents at detail.  AM labs   D/W son & Dtr in Law at detail.  D/W DR Cooper at detail.  D/W Dr Nair- ID at detail.  Repeat COVID 19 PCR test, Isolation to continue. Pt seen, examined, case & care plan d/w pt, residents at detail.  AM labs   D/W son & Dtr in Law at detail.  D/W DR Cooper at detail.Start Plavix 75 mg daily, cardiology Eval, Remote Tele  D/W Dr Nair- ID at detail.  Repeat COVID 19 PCR test, Isolation to continue.

## 2020-04-11 NOTE — PROGRESS NOTE ADULT - SUBJECTIVE AND OBJECTIVE BOX
Neurology Follow up note    PREET [id number]    HPI:  85 yo female with PMHx of HTN, breast ca (s/p L lumpectomy 2018, currently on herceptin infusions q3 week, last 2 weeks ago) p/w AMS that started suddenly today. Patient is confused so daughter-in-law and son were called. Daughter-in-law states that around 7PM earlier today, when she was dropping off some food, when her mother-in-law answered the door she was noticeably disoriented and did not recognize her daughter-in-law.  Her speech was gibberish, pt was repeating same things, She complains of feeling lightheaded and dizzy, and repeatedly stated "how did you know I was here?" Last normal conversation was at 4:30PM. No recent trauma. Patient was complaining of not feeling well last Friday but attributed it to her chemotherapy. According to daughter, patient was complaining of very vague symptomatology, feeling lightheaded, dizzy with headaches, and nausea and diarrhea. Of note, last echo 3 months ago was normal, and patient has been wearing a pessary for the past few years, complaining of occasional discomfort but no acute changes recently.      ED vitals: T: 99.1 HR: 70, BP: 167/68 RR: 18 O2 Saturation: 98 on RA   Labs: k: 2.7, UA dirty  CT Head: No acute intracranial bleeding, mass effect, or shift. Mild chronic microvascular ischemic changes.   CT chest: Clear Lungs apart from minimal bibasilar dependent atelectasis.  In the ED patient received: 1000ml IVF bolus X1,Potassium 40X1 and Potassium 10 IV X3, IVF 1000 X1, Ceftriaxone X1   UA: Trace Leuk esterases, moderate bacteria,    EKG showing NSR with L axis deviation (10 Apr 2020 02:27)      Interval History - no new complaints    Patient is seen, chart was reviewed and case was discussed with the treatment team.  Pt is not in any distress.   Lying on bed comfortably.   ir bed comfortably.    is at bedside.    Vital Signs Last 24 Hrs  T(C): 37.1 (2020 16:10), Max: 38.5 (10 Apr 2020 18:03)  T(F): 98.8 (2020 16:10), Max: 101.3 (10 Apr 2020 18:03)  HR: 58 (2020 16:10) (58 - 86)  BP: 131/77 (2020 16:10) (116/69 - 188/74)  BP(mean): --  RR: 18 (2020 16:10) (18 - 19)  SpO2: 91% (2020 16:10) (91% - 96%)        REVIEW OF SYSTEMS:    Constitutional: No  weight loss or fatigue  Eyes: No eye pain, visual disturbances, o  ENT:  No difficulty hearing, tinnitus, vertigo; No sinus or throat pain  Neck: No pain or stiffness  Respiratory: No cough, wheezing, chills or hemoptysis  Cardiovascular: No chest pain, palpitations, shortness of breath  Gastrointestinal: No abdominal or epigastric pain. No nausea, vomiting or hematemesis;  Genitourinary: No dysuria, frequency, hematuria or incontinence  Neurological: No headaches,  Psychiatric: No d mood swings or difficulty sleeping  Musculoskeletal: No joint pain   Skin: No itching, burning, rashes or lesions   Lymph Nodes: No enlarged glands  Endocrine: No heat or cold intolerance; No hair loss,   Allergy and Immunologic: No hives or eczema    On Neurological Examination:    Mental Status - Pt is alert, awake, oriented X3. H Follows commands well and able to answer questions appropriately  .Mood and affect  normal    Speech -  Normal.     Cranial Nerves - Pupils 3 mm equal and reactive to light, extraocular eye movements intact   Pt has no visual field deficit.  Pt has no facial asymmetry. Facial sensation is intact  .Tongue - is in midline.    Muscle tone - is normal all ove      Motor Exam - 5/5 all over, No drift. No shaking or tremors.    Sensory Exam - Pin prick, temperature, joint position and vibration are intact on either side. Pt withdraws all extremities equally on stimulation. No asymmetry seen. No complaints of tingling, numbness.    Gait - unsteady    Hemiataxia right    coordination:    Finger to nose: normal      Deep tendon Reflexes - 2 plus all over.       Neck Supple -  Yes.     MEDICATIONS    acetaminophen   Tablet .. 650 milliGRAM(s) Oral every 4 hours PRN  amLODIPine   Tablet 5 milliGRAM(s) Oral daily  aspirin enteric coated 81 milliGRAM(s) Oral daily  ATENolol  Tablet 100 milliGRAM(s) Oral daily  atorvastatin 10 milliGRAM(s) Oral at bedtime  cefTRIAXone   IVPB 1000 milliGRAM(s) IV Intermittent every 24 hours  enoxaparin Injectable 40 milliGRAM(s) SubCutaneous two times a day  hydrochlorothiazide 25 milliGRAM(s) Oral daily  lisinopril 40 milliGRAM(s) Oral daily  potassium chloride    Tablet ER 40 milliEquivalent(s) Oral every 4 hours      Allergies    No Known Allergies    Intolerances        LABS:  CBC Full  -  ( 2020 06:07 )  WBC Count : 8.53 K/uL  RBC Count : 4.09 M/uL  Hemoglobin : 12.0 g/dL  Hematocrit : 34.3 %  Platelet Count - Automated : 150 K/uL  Mean Cell Volume : 83.9 fl  Mean Cell Hemoglobin : 29.3 pg  Mean Cell Hemoglobin Concentration : 35.0 gm/dL  Auto Neutrophil # : x  Auto Lymphocyte # : x      Urinalysis Basic - ( 2020 22:04 )    Color: Yellow / Appearance: Slightly Turbid / S.005 / pH: x  Gluc: x / Ketone: Small  / Bili: Negative / Urobili: Negative   Blood: x / Protein: 30 mg/dL / Nitrite: Negative   Leuk Esterase: Trace / RBC: x / WBC 3-5   Sq Epi: x / Non Sq Epi: Few / Bacteria: Moderate          136  |  101  |  14  ----------------------------<  96  3.3<L>   |  29  |  0.81    Ca    8.6      2020 06:07  Mg     2.5         TPro  7.6  /  Alb  3.2<L>  /  TBili  0.6  /  DBili  x   /  AST  19  /  ALT  14  /  AlkPhos  72  04-10    Hemoglobin A1C: Hemoglobin A1C, Whole Blood: 6.4 % ( @ 10:52)    Lipid Panel  @ 10:29  Total Cholesterol, Serum 161    Triglycerides 74    Vitamin B12     RADIOLOGY  < from: MR Head No Cont (20 @ 12:06) >    EXAM:  MR BRAIN                            PROCEDURE DATE:  2020          INTERPRETATION:  MRI brain without contrast    History altered mental status    Comparison CT performed the prior day    There or multiple tiny foci of edema and matching diffusion restriction involving the deep and peripheral cortical white matter of the cerebellar hemispheres on either side. There is a single small focus involving the medial right thalamus. There is no associated mass effect or hemorrhage. There is mild to moderate chronic microvascular ischemic change in the periventricular white matter. Overall volume is maintained. There is no hydrocephalus. The orbital and sellar contents and cerebellar tonsils are within normal limits.    IMPRESSION:    Multiple tiny acute bland posterior distribution infarcts as above            EDGAR HOOD M.D., ATTENDING RADIOLOGIST  This document has been electronically signed. 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  OOB to chair/ambulation with assistance only.  Pain is accessed and addressed.  Would continue to follow.

## 2020-04-12 LAB
ALBUMIN SERPL ELPH-MCNC: 3.1 G/DL — LOW (ref 3.3–5)
ALP SERPL-CCNC: 63 U/L — SIGNIFICANT CHANGE UP (ref 40–120)
ALT FLD-CCNC: 14 U/L — SIGNIFICANT CHANGE UP (ref 12–78)
ANION GAP SERPL CALC-SCNC: 9 MMOL/L — SIGNIFICANT CHANGE UP (ref 5–17)
AST SERPL-CCNC: 22 U/L — SIGNIFICANT CHANGE UP (ref 15–37)
BASOPHILS # BLD AUTO: 0.02 K/UL — SIGNIFICANT CHANGE UP (ref 0–0.2)
BASOPHILS NFR BLD AUTO: 0.2 % — SIGNIFICANT CHANGE UP (ref 0–2)
BILIRUB SERPL-MCNC: 0.5 MG/DL — SIGNIFICANT CHANGE UP (ref 0.2–1.2)
BUN SERPL-MCNC: 20 MG/DL — SIGNIFICANT CHANGE UP (ref 7–23)
CALCIUM SERPL-MCNC: 8.8 MG/DL — SIGNIFICANT CHANGE UP (ref 8.5–10.1)
CHLORIDE SERPL-SCNC: 104 MMOL/L — SIGNIFICANT CHANGE UP (ref 96–108)
CO2 SERPL-SCNC: 25 MMOL/L — SIGNIFICANT CHANGE UP (ref 22–31)
CREAT SERPL-MCNC: 0.66 MG/DL — SIGNIFICANT CHANGE UP (ref 0.5–1.3)
CULTURE RESULTS: NO GROWTH — SIGNIFICANT CHANGE UP
EOSINOPHIL # BLD AUTO: 0.05 K/UL — SIGNIFICANT CHANGE UP (ref 0–0.5)
EOSINOPHIL NFR BLD AUTO: 0.6 % — SIGNIFICANT CHANGE UP (ref 0–6)
GLUCOSE SERPL-MCNC: 95 MG/DL — SIGNIFICANT CHANGE UP (ref 70–99)
HCT VFR BLD CALC: 35.7 % — SIGNIFICANT CHANGE UP (ref 34.5–45)
HGB BLD-MCNC: 12.2 G/DL — SIGNIFICANT CHANGE UP (ref 11.5–15.5)
IMM GRANULOCYTES NFR BLD AUTO: 0.9 % — SIGNIFICANT CHANGE UP (ref 0–1.5)
LYMPHOCYTES # BLD AUTO: 2.44 K/UL — SIGNIFICANT CHANGE UP (ref 1–3.3)
LYMPHOCYTES # BLD AUTO: 30 % — SIGNIFICANT CHANGE UP (ref 13–44)
MAGNESIUM SERPL-MCNC: 2.2 MG/DL — SIGNIFICANT CHANGE UP (ref 1.6–2.6)
MCHC RBC-ENTMCNC: 29 PG — SIGNIFICANT CHANGE UP (ref 27–34)
MCHC RBC-ENTMCNC: 34.2 GM/DL — SIGNIFICANT CHANGE UP (ref 32–36)
MCV RBC AUTO: 85 FL — SIGNIFICANT CHANGE UP (ref 80–100)
MONOCYTES # BLD AUTO: 3.04 K/UL — HIGH (ref 0–0.9)
MONOCYTES NFR BLD AUTO: 37.4 % — HIGH (ref 2–14)
NEUTROPHILS # BLD AUTO: 2.5 K/UL — SIGNIFICANT CHANGE UP (ref 1.8–7.4)
NEUTROPHILS NFR BLD AUTO: 30.9 % — LOW (ref 43–77)
NRBC # BLD: 0 /100 WBCS — SIGNIFICANT CHANGE UP (ref 0–0)
PLATELET # BLD AUTO: 142 K/UL — LOW (ref 150–400)
POTASSIUM SERPL-MCNC: 3.4 MMOL/L — LOW (ref 3.5–5.3)
POTASSIUM SERPL-SCNC: 3.4 MMOL/L — LOW (ref 3.5–5.3)
PROT SERPL-MCNC: 7.3 G/DL — SIGNIFICANT CHANGE UP (ref 6–8.3)
RBC # BLD: 4.2 M/UL — SIGNIFICANT CHANGE UP (ref 3.8–5.2)
RBC # FLD: 13.1 % — SIGNIFICANT CHANGE UP (ref 10.3–14.5)
SARS-COV-2 RNA SPEC QL NAA+PROBE: SIGNIFICANT CHANGE UP
SODIUM SERPL-SCNC: 138 MMOL/L — SIGNIFICANT CHANGE UP (ref 135–145)
SPECIMEN SOURCE: SIGNIFICANT CHANGE UP
WBC # BLD: 8.12 K/UL — SIGNIFICANT CHANGE UP (ref 3.8–10.5)
WBC # FLD AUTO: 8.12 K/UL — SIGNIFICANT CHANGE UP (ref 3.8–10.5)

## 2020-04-12 PROCEDURE — 99232 SBSQ HOSP IP/OBS MODERATE 35: CPT

## 2020-04-12 RX ORDER — ASPIRIN/CALCIUM CARB/MAGNESIUM 324 MG
1 TABLET ORAL
Qty: 0 | Refills: 0 | DISCHARGE

## 2020-04-12 RX ORDER — POTASSIUM CHLORIDE 20 MEQ
40 PACKET (EA) ORAL EVERY 4 HOURS
Refills: 0 | Status: COMPLETED | OUTPATIENT
Start: 2020-04-12 | End: 2020-04-12

## 2020-04-12 RX ORDER — CLOPIDOGREL BISULFATE 75 MG/1
1 TABLET, FILM COATED ORAL
Qty: 30 | Refills: 0
Start: 2020-04-12 | End: 2020-05-11

## 2020-04-12 RX ADMIN — ENOXAPARIN SODIUM 40 MILLIGRAM(S): 100 INJECTION SUBCUTANEOUS at 05:17

## 2020-04-12 RX ADMIN — Medication 40 MILLIEQUIVALENT(S): at 12:47

## 2020-04-12 RX ADMIN — Medication 40 MILLIEQUIVALENT(S): at 10:29

## 2020-04-12 RX ADMIN — ENOXAPARIN SODIUM 40 MILLIGRAM(S): 100 INJECTION SUBCUTANEOUS at 16:59

## 2020-04-12 RX ADMIN — CEFTRIAXONE 100 MILLIGRAM(S): 500 INJECTION, POWDER, FOR SOLUTION INTRAMUSCULAR; INTRAVENOUS at 05:17

## 2020-04-12 RX ADMIN — ATORVASTATIN CALCIUM 10 MILLIGRAM(S): 80 TABLET, FILM COATED ORAL at 21:19

## 2020-04-12 RX ADMIN — CLOPIDOGREL BISULFATE 75 MILLIGRAM(S): 75 TABLET, FILM COATED ORAL at 10:30

## 2020-04-12 RX ADMIN — FAMOTIDINE 20 MILLIGRAM(S): 10 INJECTION INTRAVENOUS at 10:30

## 2020-04-12 NOTE — PROGRESS NOTE ADULT - PROBLEM SELECTOR PLAN 3
potassium 2.7 on admission, repleted  -now 3.4, repleted again  -follow up BMP in AM, replete further as needed -on amlodipine, atenolol, HCTZ, lisinopril at home, continue with hold parameters for permissive HTN for now x24 more hours

## 2020-04-12 NOTE — PROGRESS NOTE ADULT - PROBLEM SELECTOR PLAN 6
NO COVID -19 infection   -presented with AMS, CT chest with minimal bibasilar dependent atelectasis    -saturating well on RA, COVID NEGATIVE x2 S/P left Breast Lumpectomy. S/P RT   Now on Herceptin Rx q 3 weekly with Dr Esparza 10/12 Rx completed as per Dtr in Law

## 2020-04-12 NOTE — PROGRESS NOTE ADULT - PROBLEM SELECTOR PLAN 5
S/P left Breast Lumpectomy. S/P RT   Now on Herceptin Rx q 3 weekly with Dr Esparza 10/12 Rx completed as per Dtr in Law -Hypokalemia ,-patient is on multiple home meds, according to daughter-in-law, patient was not taking potassium chloride tabs every day, last prescribed on 11/6/19 for a 3 month supply  -now 3.4, repleted again  -follow up BMP in AM, replete further as needed

## 2020-04-12 NOTE — PROGRESS NOTE ADULT - PROBLEM SELECTOR PLAN 1
-MRI shows multiple tiny acute bland posterior distribution infarcts   -speech and swallow already performed- regular diet  -HbA1C 6.4; TSH 1.16 and lipid wnl ()  -continue atorvastatin 10mg qhs  -allow for permissive HTN x24 more hours, bp meds hold parameters changed accordingly  -echo showed LVEF 60%, no acute pathology  -CTA neck no carotid stenosis  -remote tele x24 hours to assess for occult arrhythmias   -patient failed asa 81mg, now on plavix  -fall precautions  -PT eval: no PT needs  -Neuro, Dr. Cooper, consulted  -Cardio, [facility], consulted -MRI shows multiple tiny acute bland posterior distribution infarcts   -speech and swallow already performed- regular diet  -HbA1C 6.4; TSH 1.16 and lipid wnl ()  -continue atorvastatin 10mg qhs  --patient failed asa 81mg, now on Plavix 75 mg daily  -allow for permissive HTN x24 more hours, bp meds hold parameters changed accordingly  -echo showed LVEF 60%, no acute pathology  -CTA neck no carotid stenosis  -remote tele x24 hours to assess for occult arrhythmias   -PT eval: no PT needs  -Neuro, Dr. Cooper, d/w -Follow ECHO  -Cardio, [facility], consulted

## 2020-04-12 NOTE — PROGRESS NOTE ADULT - SUBJECTIVE AND OBJECTIVE BOX
Neurology Follow up note    PREET [id number]    HPI:  85 yo female with PMHx of HTN, breast ca (s/p L lumpectomy 2018, currently on herceptin infusions q3 week, last 2 weeks ago) p/w AMS that started suddenly today. Patient is confused so daughter-in-law and son were called. Daughter-in-law states that around 7PM earlier today, when she was dropping off some food, when her mother-in-law answered the door she was noticeably disoriented and did not recognize her daughter-in-law.  Her speech was gibberish, pt was repeating same things, She complains of feeling lightheaded and dizzy, and repeatedly stated "how did you know I was here?" Last normal conversation was at 4:30PM. No recent trauma. Patient was complaining of not feeling well last Friday but attributed it to her chemotherapy. According to daughter, patient was complaining of very vague symptomatology, feeling lightheaded, dizzy with headaches, and nausea and diarrhea. Of note, last echo 3 months ago was normal, and patient has been wearing a pessary for the past few years, complaining of occasional discomfort but no acute changes recently.      ED vitals: T: 99.1 HR: 70, BP: 167/68 RR: 18 O2 Saturation: 98 on RA   Labs: k: 2.7, UA dirty  CT Head: No acute intracranial bleeding, mass effect, or shift. Mild chronic microvascular ischemic changes.   CT chest: Clear Lungs apart from minimal bibasilar dependent atelectasis.  In the ED patient received: 1000ml IVF bolus X1,Potassium 40X1 and Potassium 10 IV X3, IVF 1000 X1, Ceftriaxone X1   UA: Trace Leuk esterases, moderate bacteria,    EKG showing NSR with L axis deviation (10 Apr 2020 02:27)      Interval History - "am I going home tomorrow"; no complaints    Patient is seen, chart was reviewed and case was discussed with the treatment team.  Pt is not in any distress.   Lying on bed comfortably.   ir bed comfortably.    is at bedside.    Vital Signs Last 24 Hrs  T(C): 36.9 (12 Apr 2020 12:57), Max: 37.1 (11 Apr 2020 16:10)  T(F): 98.5 (12 Apr 2020 12:57), Max: 98.8 (11 Apr 2020 16:10)  HR: 65 (12 Apr 2020 12:57) (54 - 65)  BP: 147/77 (12 Apr 2020 12:57) (125/67 - 147/77)  BP(mean): --  RR: 17 (12 Apr 2020 12:57) (17 - 18)  SpO2: 96% (12 Apr 2020 12:57) (91% - 98%)        REVIEW OF SYSTEMS:    Constitutional: No  weight loss or fatigue  Eyes: No eye pain, visual disturbances, o  ENT:  No difficulty hearing, tinnitus, vertigo; No sinus or throat pain  Neck: No pain or stiffness  Respiratory: No cough, wheezing, chills or hemoptysis  Cardiovascular: No chest pain, palpitations, shortness of breath  Gastrointestinal: No abdominal or epigastric pain. No nausea, vomiting or hematemesis;  Genitourinary: No dysuria, frequency, hematuria or incontinence  Neurological: No headaches,  Psychiatric: No d mood swings or difficulty sleeping  Musculoskeletal: No joint pain   Skin: No itching, burning, rashes or lesions   Lymph Nodes: No enlarged glands  Endocrine: No heat or cold intolerance; No hair loss,   Allergy and Immunologic: No hives or eczema    On Neurological Examination:    Mental Status - Pt is alert, awake, oriented X3.  Follows commands well and able to answer questions appropriately  .Mood and affect  normal    Speech -  Normal.     Cranial Nerves - Pupils 3 mm equal and reactive to light, extraocular eye movements intact   Pt has no visual field deficit.  Pt has no facial asymmetry. Facial sensation is intact  .Tongue - is in midline.    Muscle tone - is normal all ove      Motor Exam - 5/5 all over, No drift. No shaking or tremors.    Sensory Exam - Pin prick, temperature, joint position and vibration are intact on either side. Pt withdraws all extremities equally on stimulation. No asymmetry seen. No complaints of tingling, numbness.    Gait - unsteady    Hemiataxia right    coordination:    Finger to nose: normal      Deep tendon Reflexes - 2 plus all over.       Neck Supple -  Yes.       MEDICATIONS  (STANDING):  amLODIPine   Tablet 5 milliGRAM(s) Oral daily  ATENolol  Tablet 100 milliGRAM(s) Oral daily  atorvastatin 10 milliGRAM(s) Oral at bedtime  cefTRIAXone   IVPB 1000 milliGRAM(s) IV Intermittent every 24 hours  clopidogrel Tablet 75 milliGRAM(s) Oral daily  enoxaparin Injectable 40 milliGRAM(s) SubCutaneous two times a day  famotidine    Tablet 20 milliGRAM(s) Oral daily  hydrochlorothiazide 25 milliGRAM(s) Oral daily  lisinopril 40 milliGRAM(s) Oral daily    MEDICATIONS  (PRN):  acetaminophen   Tablet .. 650 milliGRAM(s) Oral every 4 hours PRN Temp greater or equal to 38.5C (101.3F)        Allergies    No Known Allergies    Intolerances                            12.2   8.12  )-----------( 142      ( 12 Apr 2020 08:09 )             35.7       04-12    138  |  104  |  20  ----------------------------<  95  3.4<L>   |  25  |  0.66    Ca    8.8      12 Apr 2020 08:09  Mg     2.2     04-12    TPro  7.3  /  Alb  3.1<L>  /  TBili  0.5  /  DBili  x   /  AST  22  /  ALT  14  /  AlkPhos  63  04-12      Hemoglobin A1C: Hemoglobin A1C, Whole Blood: 6.4 % (04-11 @ 10:52)    Lipid Panel 04-11 @ 10:29  Total Cholesterol, Serum 161    Triglycerides 74    Vitamin B12     RADIOLOGY  < from: MR Head No Cont (04.11.20 @ 12:06) >    EXAM:  MR BRAIN                            PROCEDURE DATE:  04/11/2020          INTERPRETATION:  MRI brain without contrast    History altered mental status    Comparison CT performed the prior day    There or multiple tiny foci of edema and matching diffusion restriction involving the deep and peripheral cortical white matter of the cerebellar hemispheres on either side. There is a single small focus involving the medial right thalamus. There is no associated mass effect or hemorrhage. There is mild to moderate chronic microvascular ischemic change in the periventricular white matter. Overall volume is maintained. There is no hydrocephalus. The orbital and sellar contents and cerebellar tonsils are within normal limits.    IMPRESSION:    Multiple tiny acute bland posterior distribution infarcts as above            EDGAR HOOD M.D., ATTENDING RADIOLOGIST  This document has been electronically signed. Apr 11 2020 12:18PM          < from: CT Angio Neck w/ IV Cont (04.11.20 @ 15:14) >    EXAM:  CT ANGIO NECK (W)AW IC                            PROCEDURE DATE:  04/11/2020          INTERPRETATION:  CT angiogram neck with contrast  History CVA, dizziness and altered mental status  Contrast dose: Omnipaque 100cc  Multiplanar MIPS included    Examination of the cervical circulation is negative for carotid or vertebral artery stenosis, occlusion or dissection. The left vertebral artery is anatomically dominant.    IMPRESSION:    Normal arterial findings                EDGAR HOOD M.D., ATTENDING RADIOLOGIST  This document has been electronically signed. 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  OOB to chair/ambulation with assistance only.  Pain is accessed and addressed.  Would continue to follow.

## 2020-04-12 NOTE — PROGRESS NOTE ADULT - SUBJECTIVE AND OBJECTIVE BOX
NP Progress Note     [facility] -- Shanell Martinez, Easton Fink, Trion Matias Savella, [place]  Office # [phone]      Follow Up: AMS    HPI:  83 yo female with PMHx of HTN, breast ca (s/p L lumpectomy 2018, currently on herceptin infusions q3 week, last 2 weeks ago) p/w AMS that started suddenly today. Patient is confused so daughter-in-law and son were called. Daughter-in-law states that around 7PM earlier today, when she was dropping off some food, when her mother-in-law answered the door she was noticeably disoriented and did not recognize her daughter-in-law.  Her speech was gibberish, pt was repeating same things, She complains of feeling lightheaded and dizzy, and repeatedly stated "how did you know I was here?" Last normal conversation was at 4:30PM. No recent trauma. Patient was complaining of not feeling well last Friday but attributed it to her chemotherapy. According to daughter, patient was complaining of very vague symptomatology, feeling lightheaded, dizzy with headaches, and nausea and diarrhea. Of note, last echo 3 months ago was normal, and patient has been wearing a pessary for the past few years, complaining of occasional discomfort but no acute changes recently.      ED vitals: T: 99.1 HR: 70, BP: 167/68 RR: 18 O2 Saturation: 98 on RA   Labs: k: 2.7, UA dirty  CT Head: No acute intracranial bleeding, mass effect, or shift. Mild chronic microvascular ischemic changes.   CT chest: Clear Lungs apart from minimal bibasilar dependent atelectasis.  In the ED patient received: 1000ml IVF bolus X1,Potassium 40X1 and Potassium 10 IV X3, IVF 1000 X1, Ceftriaxone X1   UA: Trace Leuk esterases, moderate bacteria,    EKG showing NSR with L axis deviation (10 Apr 2020 02:27)        Subjective/Observations: Pt. seen and examined and evaluated. Pt. resting comfortably in bed in NAD, with no respiratory distress, no chest pain, dyspnea, palpitations, PND, or orthopnea.    REVIEW OF SYSTEMS: All other review of systems is negative unless indicated above    PAST MEDICAL & SURGICAL HISTORY:  Breast cancer  Hypertension  H/O lumpectomy: left      MEDICATIONS  (STANDING):  amLODIPine   Tablet 5 milliGRAM(s) Oral daily  ATENolol  Tablet 100 milliGRAM(s) Oral daily  atorvastatin 10 milliGRAM(s) Oral at bedtime  cefTRIAXone   IVPB 1000 milliGRAM(s) IV Intermittent every 24 hours  clopidogrel Tablet 75 milliGRAM(s) Oral daily  enoxaparin Injectable 40 milliGRAM(s) SubCutaneous two times a day  famotidine    Tablet 20 milliGRAM(s) Oral daily  hydrochlorothiazide 25 milliGRAM(s) Oral daily  lisinopril 40 milliGRAM(s) Oral daily  potassium chloride    Tablet ER 40 milliEquivalent(s) Oral every 4 hours    MEDICATIONS  (PRN):  acetaminophen   Tablet .. 650 milliGRAM(s) Oral every 4 hours PRN Temp greater or equal to 38.5C (101.3F)      Allergies: No Known Allergies    Intolerances    Vital Signs Last 24 Hrs  T(C): 36.6 (12 Apr 2020 05:27), Max: 37.1 (11 Apr 2020 16:10)  T(F): 97.8 (12 Apr 2020 05:27), Max: 98.8 (11 Apr 2020 16:10)  HR: 54 (12 Apr 2020 05:27) (54 - 58)  BP: 125/67 (12 Apr 2020 05:27) (125/67 - 139/68)  BP(mean): --  RR: 18 (12 Apr 2020 05:27) (18 - 18)  SpO2: 98% (12 Apr 2020 05:27) (91% - 98%)    I&O's Summary            LABS: All Labs Reviewed:                        12.2   8.12  )-----------( 142      ( 12 Apr 2020 08:09 )             35.7     12 Apr 2020 08:09    138    |  104    |  20     ----------------------------<  95     3.4     |  25     |  0.66   Ca    8.8        12 Apr 2020 08:09  Mg     2.2       12 Apr 2020 08:09  TPro  7.3    /  Alb  3.1    /  TBili  0.5    /  DBili  x      /  AST  22     /  ALT  14     /  AlkPhos  63     12 Apr 2020 08:09             Echo:    < from: TTE Echo Complete w/o contrast w/ Doppler (04.11.20 @ 14:46) >  OBSERVATIONS:  Technically difficult study  Mitral Valve: normal, trace physiologic MR.  Aortic Valve/Aorta: Sclerotic trileaflet aortic valve with normal opening.  Tricuspid Valve: normal with trace TR.  Pulmonic Valve: Trace PI  Left Atrium: Enlarged  Right Atrium: Not well-visualized  Mobile interatrial septum  Left Ventricle: normal LV size and systolic function, estimated LVEF of 60%. Mild LVH  Right Ventricle: Grossly normal size and systolic function.  Pericardium/Pleura: normal, no significant pericardial effusion.  Pulmonary/RV Pressure: estimated PA systolic pressure of 34 mmHg     Conclusion:   Technically difficult study  Mild concentric LVH with normal internal dimensions and systolic function, estimated LVEF of 60%.   Grossly normal RV size and systolic function.   Left atrial enlargement  Sclerotic trileaflet aortic valve, without AI.   Trace physiologic MR and TR.          Imaging:  < from: MR Head No Cont (04.11.20 @ 12:06) >  IMPRESSION:    Multiple tiny acute bland posterior distribution infarcts as above        Interpretation of Telemetry: Overnight on telemetry SR 58      Physical Exam:  In accordance with current standards limit patient contact please refer to the recent physical exam.

## 2020-04-12 NOTE — PROGRESS NOTE ADULT - ATTENDING COMMENTS
Pt seen, examined, case & care plan d/w pt, residents at detail.  D/W family at detail.  D/W DR Cooper- Neurology at detail., D/W DR Nair-ID -Repeat Blood c/s   AM labs

## 2020-04-12 NOTE — PROGRESS NOTE ADULT - PROBLEM SELECTOR PLAN 4
patient is on multiple home meds, according to daughter-in-law, patient was not taking potassium chloride tabs every day, last prescribed on 11/6/19 for a 3 month supply  -on amlodipine, atenolol, HCTZ, lisinopril at home, continue with hold parameters for permissive HTN for now x24 more hours ? Etiology  --On Rocephin for r/o UTI-Doubt UTI , BCx NGTD, continue for 3-5 days as per ID (today is day 3)  --repeat UCx ordered, f/u results

## 2020-04-12 NOTE — PROGRESS NOTE ADULT - ASSESSMENT
83 yo female with PMHx of HTN, breast ca (s/p L lumpectomy 2018, currently on herceptin infusions q3 week, last 2 weeks ago) p/w AMS, admitted for UTI vs COVID-19 vs CVA. COVID NEGATIVE.    Cerebral vascular accident  - MRI shows multiple tiny acute bland posterior distribution infarcts   - Follow neurology recs  - Permissive HTN as per neurology   - Continue with aspirin  - Continue with Lipitor  - ECHO revealed mild concentric LVH with normal internal dimensions and systolic function, estimated LVEF of 60%. Sclerotic trileaflet aortic valve, without AI. Trace physiologic MR and TR.    - Patient spiked fever, blood c/n NGD. Will defer MOSES for now   - Consider remote tele for 24 hours to assess for any occult arrhythmias   - ECHO from 10/2019 showed MAC, min MR, Mod dil LA, EF 70%, Mild diastolic dysfunction, mild pulm HTN  - EKG showed T wave flattening initially which is now resolving. Repeat EKG showed NSR @ 62. TWI V1-V6  - Had normal stress 12/2018    Hypertension  - Patient on amlodipine, atenolol, HCTZ, lisinopril at home  - Continue with hold parameters for permissive HTN for now x48 hours.    R/O SARS-associated coronavirus infection  - Saturating well on RA, COVID NEGATIVE.  - Repeat COVID pending     Hypokalemia   -Potassium today 3.4 supplement as needed   - Monitor and replete lytes, keep K>4, Mg>2.  - EKG shows T wave flattening initially which is improving     DVT prophylaxis   - Lovenox 40 mg BID per primary     - Monitor and replete lytes, keep K>4, Mg>2.  - All other medical needs as per primary team.  - Other cardiovascular workup will depend on clinical course.  - Will continue to follow.      Lesa Barrera DNP, ANP-c   Nurse Practitioner- Cardiology   Spectra #[phone]

## 2020-04-12 NOTE — PROGRESS NOTE ADULT - PROBLEM SELECTOR PLAN 7
had extensive discussion with daughter, daughter-in-law, and son  -son and daughter are HCP, MOLST form is in patient's home, son states that he will find the MOLST form (unsure whether or not patient is DNR/DNI) NO COVID -19 infection   -presented with AMS, CT chest with minimal bibasilar dependent atelectasis    -saturating well on RA, COVID NEGATIVE x2

## 2020-04-12 NOTE — PROGRESS NOTE ADULT - PROBLEM SELECTOR PLAN 2
-likely due to CVA however also r/o UTI  -COVID NEGATIVE x2  -On rocephin for r/o UTI-Doubt UTI , BCx NGTD, continue for 3-5 days as per ID (today is day 3)  -repeat UCx ordered, f/u results  -currently saturating well on RA  -ID, Dr. Nair consulted -likely due to CVA however also r/o UTI  -COVID NEGATIVE x2  -Pt is  back to self

## 2020-04-12 NOTE — PROGRESS NOTE ADULT - PROBLEM SELECTOR PLAN 8
Lovenox 40 mg BID   PT eval- no PT needs    IMPROVE VTE Individual Risk Assessment  RISK                                                                Points  [  ] Previous VTE                                                  3  [  ] Thrombophilia                                               2  [  ] Lower limb paralysis                                      2        (unable to hold up >15 seconds)    [ x ] Current Cancer                                              2         (within 6 months)  [  ] Immobilization > 24 hrs                                1  [  ] ICU/CCU stay > 24 hours                              1  [ x ] Age > 60                                                      1  IMPROVE VTE Score: 3    IMPROVE Score 0-1: Low Risk, No VTE prophylaxis required for most patients, encourage ambulation.   IMPROVE Score 2-3: At risk, pharmacologic VTE prophylaxis is indicated for most patients (in the absence of a contraindication)  IMPROVE Score > or = 4: High Risk, pharmacologic VTE prophylaxis is indicated for most patients (in the absence of a contraindication) had extensive discussion with daughter, daughter-in-law, and son  -son and daughter are HCP, MOLST form is in patient's home, son states that he will find the MOLST form (unsure whether or not patient is DNR/DNI)

## 2020-04-12 NOTE — PROGRESS NOTE ADULT - ASSESSMENT
85 yo female with PMHx of HTN, breast ca (s/p L lumpectomy 2018, currently on herceptin infusions q3 week, last 2 weeks ago) p/w AMS, admitted for metabolic encephalopathy 2/2 UTI vs. CVA vs. COVID-19, COVID NEGATIVE, found to have acute CVA.

## 2020-04-12 NOTE — PROGRESS NOTE ADULT - SUBJECTIVE AND OBJECTIVE BOX
Patient is a 84y old  Female who presents with a chief complaint of AMS (2020 16:31)    INTERVAL HPI:  85 yo female with PMHx of HTN, breast ca (s/p L lumpectomy 2018, currently on herceptin infusions q3 week, last 2 weeks ago) p/w AMS that started suddenly today. Patient is confused so daughter-in-law and son were called. Daughter-in-law states that around 7PM earlier today, when she was dropping off some food, when her mother-in-law answered the door she was noticeably disoriented and did not recognize her daughter-in-law. She complains of feeling lightheaded and dizzy, and repeatedly stated "how did you know I was here?" Last normal conversation was at 4:30PM. No recent trauma. Patient was complaining of not feeling well last Friday but attributed it to her chemotherapy. According to daughter, patient was complaining of very vague symptomatology, feeling lightheaded, dizzy with headaches, and nausea and diarrhea. Of note, last echo 3 months ago was normal, and patient has been wearing a pessary for the past few years, complaining of occasional discomfort but no acute changes recently.    4/10/2020: No acute events since admission over night.  Saturating in high 90s on room air.  COVID NEGATIVE.  MR head ordered by neuro. S&S --Regular Diet. K repleted.  2020: Fever 101.3F at 1800 on 4/10, already on rocephin.  BCx NGTD.  K repleted.  Saturating well on room air. Will get repeat COVID testing in setting of fever and possible exposure. 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  Cardio Dr Tong  and ID Dr Nair consulted.  Started on atorvastatin.  Echo & CD ordered.   Allow for permissive HTN.    2020: No acute events overnight, afebrile and saturating well on room air. Repeat COVID testing NEGATIVE. K repleted.  Asa switched to plavix.  Patient started on famotidine. CTA neck with no carotid stenosis and echo showed LVEF 60%, no acute pathology.    OVERNIGHT EVENTS: none  Home Medications:  amLODIPine 5 mg oral tablet: 1 tab(s) orally once a day (10 Apr 2020 02:56)  aspirin 81 mg oral tablet: 1 tab(s) orally once a day (10 Apr 2020 02:56)  atenolol 100 mg oral tablet: 1 tab(s) orally once a day (10 Apr 2020 03:00)  Herceptin: every 3 weeks  (10 Apr 2020 03:00)  hydroCHLOROthiazide 25 mg oral tablet: 1 tab(s) orally once a day (10 Apr 2020 02:56)  lisinopril 40 mg oral tablet: 1 tab(s) orally once a day (10 Apr 2020 03:00)  potassium chloride 10 mEq oral capsule, extended release: orally once a day (10 Apr 2020 03:00)      MEDICATIONS  (STANDING):  amLODIPine   Tablet 5 milliGRAM(s) Oral daily  ATENolol  Tablet 100 milliGRAM(s) Oral daily  atorvastatin 10 milliGRAM(s) Oral at bedtime  cefTRIAXone   IVPB 1000 milliGRAM(s) IV Intermittent every 24 hours  clopidogrel Tablet 75 milliGRAM(s) Oral daily  enoxaparin Injectable 40 milliGRAM(s) SubCutaneous two times a day  famotidine    Tablet 20 milliGRAM(s) Oral daily  hydrochlorothiazide 25 milliGRAM(s) Oral daily  lisinopril 40 milliGRAM(s) Oral daily  potassium chloride    Tablet ER 40 milliEquivalent(s) Oral every 4 hours    MEDICATIONS  (PRN):  acetaminophen   Tablet .. 650 milliGRAM(s) Oral every 4 hours PRN Temp greater or equal to 38.5C (101.3F)      Allergies    No Known Allergies    Intolerances      REVIEW OF SYSTEMS: i feel fine can I go home ?   CONSTITUTIONAL: No fever, No chills, No fatigue, admits to myalgia, No Body ache, No Weakness  EYES: No eye pain,  No visual disturbances, No discharge, No Redness  ENMT: No ear pain, No nose bleed, No vertigo; No sinus or throat pain, No Congestion  NECK: No pain, No stiffness  RESPIRATORY: no cough, No wheezing, No hemoptysis, no shortness of breath  CARDIOVASCULAR: No chest pain, admits to palpitations  GASTROINTESTINAL: No abdominal or epigastric pain. No nausea, No vomiting, No diarrhea or constipation; [  ] BM  GENITOURINARY: No dysuria, No frequency, No urgency, No hematuria or incontinence  NEUROLOGICAL: No headaches, No dizziness, No numbness, No tingling, No tremors, No weakness  EXT: No Swelling, No Pain, No Edema  SKIN: [ X ] No itching, burning, rashes, or lesions   MUSCULOSKELETAL: No joint pain or swelling; No muscle pain, No back pain, No extremity pain  PSYCHIATRIC: No depression, anxiety, mood swings or difficulty sleeping at night  PAIN SCALE: [X  ] None  [  ] Other-  ROS Unable to obtain due to: [  ] Dementia  [  ] Lethargy  [  ] Sedated  [  ]  non verbal [X ] AMS  REST OF REVIEW OF SYSTEMS: [ x ] Normal    Vital Signs Last 24 Hrs  T(C): 36.6 (2020 05:27), Max: 37.1 (2020 16:10)  T(F): 97.8 (2020 05:27), Max: 98.8 (2020 16:10)  HR: 54 (2020 05:27) (54 - 58)  BP: 125/67 (2020 05:27) (125/67 - 139/68)  BP(mean): --  RR: 18 (2020 05:27) (18 - 18)  SpO2: 98% (2020 05:27) (91% - 98%)    Finger Stick      PHYSICAL EXAM: No Dysarthria   GENERAL:  [ X ] NAD, [X  ] Well appearing, [  ] Agitated, [  ] Drowsy, [  ] Lethargy, [  ] Confused   HEAD:  [X  ] Normal, [  ] Other  EYES:  [ X ] EOMI, [X  ] PERRLA, [X  ] Conjunctiva and sclera clear normal, [  ] Other, [  ] Pallor, [  ] Discharge  ENMT:  [ X ] Normal, [ X ] Moist mucous membranes, [ X ] Good dentition, [  ] No thrush  NECK:  [ X ] Supple, [X  ] No JVD, [ x ] Normal thyroid, [  ] Lymphadenopathy, [  ] Other  CHEST/LUNG:  [X  ] Clear to auscultation bilaterally, [ x ] Breath Sounds equal B/L decreased, [X  ] No rales, [ X ] No rhonchi, [X  ] No wheezing  HEART:  [ X ] Regular rate and rhythm, [  ] Tachycardia, [  ] Bradycardia, [  ] Irregular, [X  ] No murmurs, No rubs, No gallops, [  ] PPM in place (Mfr:  )  ABDOMEN:  [ X ] Soft, [ X ] Nontender, [ X ] Nondistended, [X  ] No mass, [X  ] Bowel sounds present, [x  ] Obese  NERVOUS SYSTEM:  [ X ] Alert & Oriented x3, [X  ] Nonfocal, [  ] Confusion, [  ] Encephalopathic, [  ] Sedated, [  ] Unable to assess, [  ] Other-  EXTREMITIES:  [ X ] 2+ Peripheral Pulses, No clubbing, No cyanosis,  [  ] edema B/L lower EXT, [  ] PVD stasis skin changes B/L lower EXT  LYMPH:  No lymphadenopathy noted  SKIN:  [X  ] No rashes or lesions, [  ] Pressure ulcers, [  ] Ecchymosis, [  ] Skin tears, [  ] Other    DIET: DASH    LABS:                        12.2   8.12  )-----------( 142      ( 2020 08:09 )             35.7     2020 08:09    138    |  104    |  20     ----------------------------<  95     3.4     |  25     |  0.66     Ca    8.8        2020 08:09  Mg     2.2       2020 08:09    TPro  7.3    /  Alb  3.1    /  TBili  0.5    /  DBili  x      /  AST  22     /  ALT  14     /  AlkPhos  63     2020 08:09      Urinalysis Basic - ( 2020 17:22 )    Color: Yellow / Appearance: Clear / S.005 / pH: x  Gluc: x / Ketone: Negative  / Bili: Negative / Urobili: Negative   Blood: x / Protein: 30 mg/dL / Nitrite: Negative   Leuk Esterase: Trace / RBC: 0-2 /HPF / WBC 0-2   Sq Epi: x / Non Sq Epi: Occasional / Bacteria: Occasional        Culture Results:   No growth to date. (04-10 @ 00:22)  Culture Results:   No growth to date. (04-10 @ 00:22)          CARDIAC MARKERS ( 2020 21:56 )  <.015 ng/mL / x     / x     / x     / x              Culture - Blood (collected 10 Apr 2020 00:22)  Source: .Blood Blood-Venous  Preliminary Report (2020 01:03):    No growth to date.    Culture - Blood (collected 10 Apr 2020 00:22)  Source: .Blood Blood-Venous  Preliminary Report (2020 01:03):    No growth to date.         Anemia Panel:      Thyroid Panel:  Thyroid Stimulating Hormone, Serum: 1.16 uIU/mL (20 @ 06:07)        Lipase, Serum: 215 U/L (20 @ 21:56)          RADIOLOGY & ADDITIONAL TESTS:    HEALTH ISSUES - PROBLEM Dx:  Advanced directives, counseling/discussion: Advanced directives, counseling/discussion  Need for prophylactic measure: Need for prophylactic measure  Metabolic encephalopathy: Metabolic encephalopathy  Hypokalemia: Hypokalemia  HTN (hypertension): HTN (hypertension)  Fever: Fever  Breast cancer: Breast cancer  CVA (cerebral vascular accident): CVA (cerebral vascular accident)        Consultant(s) Notes Reviewed:  [X  ] YES     Care Discussed with [ x ] Consultants, [ X ] Patient, [  ] Family, [X  ] , [ X ] Social Service, [X  ] RN, [  ] Physical Therapy  DVT PPX: [X  ] Lovenox, [  ] S C Heparin, [  ] Coumadin, [  ] Xarelto, [  ] Eliquis, [  ] Pradaxa, [  ] IV Heparin drip, [  ] SCD, [  ] Contraindication secondary to GI Bleed, [  ] Ambulation  Advanced Directive: [X  ] None, [  ] DNR/DNI Patient is a 84y old  Female who presents with a chief complaint of AMS (2020 16:31)    INTERVAL HPI:  83 yo female with PMHx of HTN, breast ca (s/p L lumpectomy 2018, currently on herceptin infusions q3 week, last 2 weeks ago) p/w AMS that started suddenly today. Patient is confused so daughter-in-law and son were called. Daughter-in-law states that around 7PM earlier today, when she was dropping off some food, when her mother-in-law answered the door she was noticeably disoriented and did not recognize her daughter-in-law. She complains of feeling lightheaded and dizzy, and repeatedly stated "how did you know I was here?" Last normal conversation was at 4:30PM. No recent trauma. Patient was complaining of not feeling well last Friday but attributed it to her chemotherapy. According to daughter, patient was complaining of very vague symptomatology, feeling lightheaded, dizzy with headaches, and nausea and diarrhea. Of note, last echo 3 months ago was normal, and patient has been wearing a pessary for the past few years, complaining of occasional discomfort but no acute changes recently.    4/10/2020: No acute events since admission over night.  Saturating in high 90s on room air.  COVID NEGATIVE.  MR head ordered by neuro. S&S --Regular Diet. K repleted.  2020: Fever 101.3F at 1800 on 4/10, already on rocephin.  BCx NGTD.  K repleted.  Saturating well on room air. Will get repeat COVID testing in setting of fever and possible exposure. 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  Cardio Dr Tong  and ID Dr Nair consulted.  Started on atorvastatin.  Echo & CD ordered.   Allow for permissive HTN.    2020: No acute events overnight, afebrile and saturating well on room air. Repeat COVID testing NEGATIVE. K repleted.  Asa switched to plavix.  Patient started on famotidine. CTA neck with no carotid stenosis and echo showed LVEF 60%, no acute pathology.    OVERNIGHT EVENTS: none  Home Medications:  amLODIPine 5 mg oral tablet: 1 tab(s) orally once a day (10 Apr 2020 02:56)  aspirin 81 mg oral tablet: 1 tab(s) orally once a day (10 Apr 2020 02:56)  atenolol 100 mg oral tablet: 1 tab(s) orally once a day (10 Apr 2020 03:00)  Herceptin: every 3 weeks  (10 Apr 2020 03:00)  hydroCHLOROthiazide 25 mg oral tablet: 1 tab(s) orally once a day (10 Apr 2020 02:56)  lisinopril 40 mg oral tablet: 1 tab(s) orally once a day (10 Apr 2020 03:00)  potassium chloride 10 mEq oral capsule, extended release: orally once a day (10 Apr 2020 03:00)      MEDICATIONS  (STANDING):  amLODIPine   Tablet 5 milliGRAM(s) Oral daily  ATENolol  Tablet 100 milliGRAM(s) Oral daily  atorvastatin 10 milliGRAM(s) Oral at bedtime  cefTRIAXone   IVPB 1000 milliGRAM(s) IV Intermittent every 24 hours  clopidogrel Tablet 75 milliGRAM(s) Oral daily  enoxaparin Injectable 40 milliGRAM(s) SubCutaneous two times a day  famotidine    Tablet 20 milliGRAM(s) Oral daily  hydrochlorothiazide 25 milliGRAM(s) Oral daily  lisinopril 40 milliGRAM(s) Oral daily  potassium chloride    Tablet ER 40 milliEquivalent(s) Oral every 4 hours    MEDICATIONS  (PRN):  acetaminophen   Tablet .. 650 milliGRAM(s) Oral every 4 hours PRN Temp greater or equal to 38.5C (101.3F)      Allergies    No Known Allergies    Intolerances      REVIEW OF SYSTEMS: i feel fine can I go home ?   CONSTITUTIONAL: No fever, No chills, No fatigue, admits to myalgia, No Body ache, No Weakness  EYES: No eye pain,  No visual disturbances, No discharge, No Redness  ENMT: No ear pain, No nose bleed, No vertigo; No sinus or throat pain, No Congestion  NECK: No pain, No stiffness  RESPIRATORY: no cough, No wheezing, No hemoptysis, no shortness of breath  CARDIOVASCULAR: No chest pain, admits to palpitations  GASTROINTESTINAL: No abdominal or epigastric pain. No nausea, No vomiting, No diarrhea or constipation; [  ] BM  GENITOURINARY: No dysuria, No frequency, No urgency, No hematuria or incontinence  NEUROLOGICAL: No headaches, No dizziness, No numbness, No tingling, No tremors, No weakness  EXT: No Swelling, No Pain, No Edema  SKIN: [ X ] No itching, burning, rashes, or lesions   MUSCULOSKELETAL: No joint pain or swelling; No muscle pain, No back pain, No extremity pain  PSYCHIATRIC: No depression, anxiety, mood swings or difficulty sleeping at night  PAIN SCALE: [X  ] None  [  ] Other-  ROS Unable to obtain due to: [  ] Dementia  [  ] Lethargy  [  ] Sedated  [  ]  non verbal [X ] AMS  REST OF REVIEW OF SYSTEMS: [ x ] Normal    Vital Signs Last 24 Hrs  T(C): 36.6 (2020 05:27), Max: 37.1 (2020 16:10)  T(F): 97.8 (2020 05:27), Max: 98.8 (2020 16:10)  HR: 54 (2020 05:27) (54 - 58)  BP: 125/67 (2020 05:27) (125/67 - 139/68)  BP(mean): --  RR: 18 (2020 05:27) (18 - 18)  SpO2: 98% (2020 05:27) (91% - 98%)    Finger Stick      PHYSICAL EXAM: No Dysarthria   GENERAL:  [ X ] NAD, [X  ] Well appearing, [  ] Agitated, [  ] Drowsy, [  ] Lethargy, [  ] Confused   HEAD:  [X  ] Normal, [  ] Other  EYES:  [ X ] EOMI, [X  ] PERRLA, [X  ] Conjunctiva and sclera clear normal, [  ] Other, [  ] Pallor, [  ] Discharge  ENMT:  [ X ] Normal, [ X ] Moist mucous membranes, [ X ] Good dentition, [  ] No thrush  NECK:  [ X ] Supple, [X  ] No JVD, [ x ] Normal thyroid, [  ] Lymphadenopathy, [  ] Other  CHEST/LUNG:  [X  ] Clear to auscultation bilaterally, [ x ] Breath Sounds equal B/L decreased, [X  ] No rales, [ X ] No rhonchi, [X  ] No wheezing  HEART:  [ X ] Regular rate and rhythm, [  ] Tachycardia, [  ] Bradycardia, [  ] Irregular, [X  ] No murmurs, No rubs, No gallops, [  ] PPM in place (Mfr:  )  ABDOMEN:  [ X ] Soft, [ X ] Nontender, [ X ] Nondistended, [X  ] No mass, [X  ] Bowel sounds present, [x  ] Obese  NERVOUS SYSTEM:  [ X ] Alert & Oriented x3, [X  ] Nonfocal, [  ] Confusion, [  ] Encephalopathic, [  ] Sedated, [  ] Unable to assess, [  ] Other-  EXTREMITIES:  [ X ] 2+ Peripheral Pulses, No clubbing, No cyanosis,  [  ] edema B/L lower EXT, [  ] PVD stasis skin changes B/L lower EXT  LYMPH:  No lymphadenopathy noted  SKIN:  [X  ] No rashes or lesions, [  ] Pressure ulcers, [  ] Ecchymosis, [  ] Skin tears, [  ] Other    DIET: DASH    LABS:                        12.2   8.12  )-----------( 142      ( 2020 08:09 )             35.7     2020 08:09    138    |  104    |  20     ----------------------------<  95     3.4     |  25     |  0.66     Ca    8.8        2020 08:09  Mg     2.2       2020 08:09    TPro  7.3    /  Alb  3.1    /  TBili  0.5    /  DBili  x      /  AST  22     /  ALT  14     /  AlkPhos  63     2020 08:09      Urinalysis Basic - ( 2020 17:22 )    Color: Yellow / Appearance: Clear / S.005 / pH: x  Gluc: x / Ketone: Negative  / Bili: Negative / Urobili: Negative   Blood: x / Protein: 30 mg/dL / Nitrite: Negative   Leuk Esterase: Trace / RBC: 0-2 /HPF / WBC 0-2   Sq Epi: x / Non Sq Epi: Occasional / Bacteria: Occasional        Culture Results:   No growth to date. (04-10 @ 00:22)  Culture Results:   No growth to date. (04-10 @ 00:22)          CARDIAC MARKERS ( 2020 21:56 )  <.015 ng/mL / x     / x     / x     / x              Culture - Blood (collected 10 Apr 2020 00:22)  Source: .Blood Blood-Venous  Preliminary Report (2020 01:03):    No growth to date.    Culture - Blood (collected 10 Apr 2020 00:22)  Source: .Blood Blood-Venous  Preliminary Report (2020 01:03):    No growth to date.         Anemia Panel:      Thyroid Panel:  Thyroid Stimulating Hormone, Serum: 1.16 uIU/mL (20 @ 06:07)        Lipase, Serum: 215 U/L (20 @ 21:56)    RADIOLOGY & ADDITIONAL TESTS: NONE    HEALTH ISSUES - PROBLEM Dx:  Advanced directives, counseling/discussion: Advanced directives, counseling/discussion  Need for prophylactic measure: Need for prophylactic measure  Metabolic encephalopathy: Metabolic encephalopathy  Hypokalemia: Hypokalemia  HTN (hypertension): HTN (hypertension)  Fever: Fever  Breast cancer: Breast cancer  CVA (cerebral vascular accident): CVA (cerebral vascular accident)        Consultant(s) Notes Reviewed:  [X  ] YES     Care Discussed with [ x ] Consultants, [ X ] Patient, [  ] Family, [X  ] , [ X ] Social Service, [X  ] RN, [  ] Physical Therapy  DVT PPX: [X  ] Lovenox, [  ] S C Heparin, [  ] Coumadin, [  ] Xarelto, [  ] Eliquis, [  ] Pradaxa, [  ] IV Heparin drip, [  ] SCD, [  ] Contraindication secondary to GI Bleed, [  ] Ambulation  Advanced Directive: [X  ] None, [  ] DNR/DNI Patient is a 84y old  Female who presents with a chief complaint of AMS (2020 16:31)    INTERVAL HPI:  85 yo female with PMHx of HTN, breast ca (s/p L lumpectomy 2018, currently on herceptin infusions q3 week, last 2 weeks ago) p/w AMS that started suddenly today. Patient is confused so daughter-in-law and son were called. Daughter-in-law states that around 7PM earlier today, when she was dropping off some food, when her mother-in-law answered the door she was noticeably disoriented and did not recognize her daughter-in-law. She complains of feeling lightheaded and dizzy, and repeatedly stated "how did you know I was here?" Last normal conversation was at 4:30PM. No recent trauma. Patient was complaining of not feeling well last Friday but attributed it to her chemotherapy. According to daughter, patient was complaining of very vague symptomatology, feeling lightheaded, dizzy with headaches, and nausea and diarrhea. Of note, last echo 3 months ago was normal, and patient has been wearing a pessary for the past few years, complaining of occasional discomfort but no acute changes recently.    4/10/2020: No acute events since admission over night.  Saturating in high 90s on room air.  COVID NEGATIVE.  MR head ordered by neuro. S&S --Regular Diet. K repleted.  2020: Fever 101.3F at 1800 on 4/10, already on rocephin.  BCx NGTD.  K repleted.  Saturating well on room air. Will get repeat COVID testing in setting of fever and possible exposure. 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  Cardio Dr Tong  and ID Dr Nair consulted.  Started on atorvastatin.  Echo & CD ordered.   Allow for permissive HTN.    2020: No acute events overnight, afebrile and saturating well on room air. Repeat COVID testing NEGATIVE. K repleted.  Asa switched to plavix.  Patient started on famotidine. CTA neck with no carotid stenosis and echo showed LVEF 60%, no acute pathology.    OVERNIGHT EVENTS: none  Home Medications:  amLODIPine 5 mg oral tablet: 1 tab(s) orally once a day (10 Apr 2020 02:56)  aspirin 81 mg oral tablet: 1 tab(s) orally once a day (10 Apr 2020 02:56)  atenolol 100 mg oral tablet: 1 tab(s) orally once a day (10 Apr 2020 03:00)  Herceptin: every 3 weeks  (10 Apr 2020 03:00)  hydroCHLOROthiazide 25 mg oral tablet: 1 tab(s) orally once a day (10 Apr 2020 02:56)  lisinopril 40 mg oral tablet: 1 tab(s) orally once a day (10 Apr 2020 03:00)  potassium chloride 10 mEq oral capsule, extended release: orally once a day (10 Apr 2020 03:00)      MEDICATIONS  (STANDING):  amLODIPine   Tablet 5 milliGRAM(s) Oral daily  ATENolol  Tablet 100 milliGRAM(s) Oral daily  atorvastatin 10 milliGRAM(s) Oral at bedtime  cefTRIAXone   IVPB 1000 milliGRAM(s) IV Intermittent every 24 hours  clopidogrel Tablet 75 milliGRAM(s) Oral daily  enoxaparin Injectable 40 milliGRAM(s) SubCutaneous two times a day  famotidine    Tablet 20 milliGRAM(s) Oral daily  hydrochlorothiazide 25 milliGRAM(s) Oral daily  lisinopril 40 milliGRAM(s) Oral daily  potassium chloride    Tablet ER 40 milliEquivalent(s) Oral every 4 hours    MEDICATIONS  (PRN):  acetaminophen   Tablet .. 650 milliGRAM(s) Oral every 4 hours PRN Temp greater or equal to 38.5C (101.3F)      Allergies    No Known Allergies    Intolerances      REVIEW OF SYSTEMS: i feel fine can I go home ?   CONSTITUTIONAL: No fever, No chills, No fatigue, admits to myalgia, No Body ache, No Weakness  EYES: No eye pain,  No visual disturbances, No discharge, No Redness  ENMT: No ear pain, No nose bleed, No vertigo; No sinus or throat pain, No Congestion  NECK: No pain, No stiffness  RESPIRATORY: no cough, No wheezing, No hemoptysis, no shortness of breath  CARDIOVASCULAR: No chest pain, admits to palpitations  GASTROINTESTINAL: No abdominal or epigastric pain. No nausea, No vomiting, No diarrhea or constipation; [  ] BM  GENITOURINARY: No dysuria, No frequency, No urgency, No hematuria or incontinence  NEUROLOGICAL: No headaches, No dizziness, No numbness, No tingling, No tremors, No weakness  EXT: No Swelling, No Pain, No Edema  SKIN: [ X ] No itching, burning, rashes, or lesions   MUSCULOSKELETAL: No joint pain or swelling; No muscle pain, No back pain, No extremity pain  PSYCHIATRIC: No depression, anxiety, mood swings or difficulty sleeping at night  PAIN SCALE: [X  ] None  [  ] Other-  ROS Unable to obtain due to: [  ] Dementia  [  ] Lethargy  [  ] Sedated  [  ]  non verbal [X ] AMS  REST OF REVIEW OF SYSTEMS: [ x ] Normal    Vital Signs Last 24 Hrs  T(C): 36.6 (2020 05:27), Max: 37.1 (2020 16:10)  T(F): 97.8 (2020 05:27), Max: 98.8 (2020 16:10)  HR: 54 (2020 05:27) (54 - 58)  BP: 125/67 (2020 05:27) (125/67 - 139/68)  BP(mean): --  RR: 18 (2020 05:27) (18 - 18)  SpO2: 98% (2020 05:27) (91% - 98%)    Finger Stick      PHYSICAL EXAM: No Dysarthria   GENERAL:  [ X ] NAD, [X  ] Well appearing, [  ] Agitated, [  ] Drowsy, [  ] Lethargy, [  ] Confused   HEAD:  [X  ] Normal, [  ] Other  EYES:  [ X ] EOMI, [X  ] PERRLA, [X  ] Conjunctiva and sclera clear normal, [  ] Other, [  ] Pallor, [  ] Discharge  ENMT:  [ X ] Normal, [ X ] Moist mucous membranes, [ X ] Good dentition, [  ] No thrush  NECK:  [ X ] Supple, [X  ] No JVD, [ x ] Normal thyroid, [  ] Lymphadenopathy, [  ] Other  CHEST/LUNG:  [X  ] Clear to auscultation bilaterally, [ x ] Breath Sounds equal B/L decreased, [X  ] No rales, [ X ] No rhonchi, [X  ] No wheezing  HEART:  [ X ] Regular rate and rhythm, [  ] Tachycardia, [  ] Bradycardia, [  ] Irregular, [X  ] No murmurs, No rubs, No gallops, [  ] PPM in place (Mfr:  )  ABDOMEN:  [ X ] Soft, [ X ] Nontender, [ X ] Nondistended, [X  ] No mass, [X  ] Bowel sounds present, [x  ] Obese  NERVOUS SYSTEM:  [ X ] Alert & Oriented x3, [X  ] Nonfocal, [  ] Confusion, [  ] Encephalopathic, [  ] Sedated, [  ] Unable to assess, [  ] Other-  EXTREMITIES:  [ X ] 2+ Peripheral Pulses, No clubbing, No cyanosis,  [  ] edema B/L lower EXT, [  ] PVD stasis skin changes B/L lower EXT  LYMPH:  No lymphadenopathy noted  SKIN:  [X  ] No rashes or lesions, [  ] Pressure ulcers, [  ] Ecchymosis, [  ] Skin tears, [  ] Other    DIET: DASH    LABS:                        12.2   8.12  )-----------( 142      ( 2020 08:09 )             35.7     2020 08:09    138    |  104    |  20     ----------------------------<  95     3.4     |  25     |  0.66     Ca    8.8        2020 08:09  Mg     2.2       2020 08:09    TPro  7.3    /  Alb  3.1    /  TBili  0.5    /  DBili  x      /  AST  22     /  ALT  14     /  AlkPhos  63     2020 08:09      Urinalysis Basic - ( 2020 17:22 )    Color: Yellow / Appearance: Clear / S.005 / pH: x  Gluc: x / Ketone: Negative  / Bili: Negative / Urobili: Negative   Blood: x / Protein: 30 mg/dL / Nitrite: Negative   Leuk Esterase: Trace / RBC: 0-2 /HPF / WBC 0-2   Sq Epi: x / Non Sq Epi: Occasional / Bacteria: Occasional        Culture Results:   No growth to date. (04-10 @ 00:22)  Culture Results:   No growth to date. (04-10 @ 00:22)      CARDIAC MARKERS ( 2020 21:56 )  <.015 ng/mL / x     / x     / x     / x        Culture - Blood (collected 10 Apr 2020 00:22)  Source: .Blood Blood-Venous  Preliminary Report (2020 01:03):    No growth to date.    Culture - Blood (collected 10 Apr 2020 00:22)  Source: .Blood Blood-Venous  Preliminary Report (2020 01:03):    No growth to date.         Anemia Panel:      Thyroid Panel:  Thyroid Stimulating Hormone, Serum: 1.16 uIU/mL (20 @ 06:07)        Lipase, Serum: 215 U/L (20 @ 21:56)    RADIOLOGY & ADDITIONAL TESTS:   < from: CT Angio Neck w/ IV Cont (20 @ 15:14) >    EXAM:  CT ANGIO NECK (W)AW IC                            PROCEDURE DATE:  2020          INTERPRETATION:  CT angiogram neck with contrast  History CVA, dizziness and altered mental status  Contrast dose: Omnipaque 100cc  Multiplanar MIPS included    Examination of the cervical circulation is negative for carotid or vertebral artery stenosis, occlusion or dissection. The left vertebral artery is anatomically dominant.    IMPRESSION:    Normal arterial findings    < from: TTE Echo Complete w/o contrast w/ Doppler (20 @ 14:46) >  LVIDs 3.1         Normal Values: 1.8 - 4.0 cm      OBSERVATIONS:  Technically difficult study  Mitral Valve: normal, trace physiologic MR.  Aortic Valve/Aorta: Sclerotic trileaflet aortic valve with normal opening.  Tricuspid Valve: normal with trace TR.  Pulmonic Valve: Trace PI  Left Atrium: Enlarged  Right Atrium: Not well-visualized  Mobile interatrial septum  Left Ventricle: normal LV size and systolic function, estimated LVEF of 60%. Mild LVH  Right Ventricle: Grossly normal size and systolic function.  Pericardium/Pleura: normal, no significant pericardial effusion.  Pulmonary/RV Pressure: estimated PA systolic pressure of 34 mmHg     Conclusion:   Technically difficult study  Mild concentric LVH with normal internal dimensions and systolic function, estimated LVEF of 60%.   Grossly normal RV size and systolic function.   Left atrial enlargement  Sclerotic trileaflet aortic valve, without AI.   Trace physiologic MR and TR.          < end of copied text >      HEALTH ISSUES - PROBLEM Dx:  Advanced directives, counseling/discussion: Advanced directives, counseling/discussion  Need for prophylactic measure: Need for prophylactic measure  Metabolic encephalopathy: Metabolic encephalopathy  Hypokalemia: Hypokalemia  HTN (hypertension): HTN (hypertension)  Fever: Fever  Breast cancer: Breast cancer  CVA (cerebral vascular accident): CVA (cerebral vascular accident)        Consultant(s) Notes Reviewed:  [X  ] YES     Care Discussed with [ x ] Consultants, [ X ] Patient, [  ] Family, [X  ] , [ X ] Social Service, [X  ] RN, [  ] Physical Therapy  DVT PPX: [X  ] Lovenox, [  ] S C Heparin, [  ] Coumadin, [  ] Xarelto, [  ] Eliquis, [  ] Pradaxa, [  ] IV Heparin drip, [  ] SCD, [  ] Contraindication secondary to GI Bleed, [  ] Ambulation  Advanced Directive: [X  ] None, [  ] DNR/DNI

## 2020-04-13 LAB
ALBUMIN SERPL ELPH-MCNC: 3.4 G/DL — SIGNIFICANT CHANGE UP (ref 3.3–5)
ALP SERPL-CCNC: 68 U/L — SIGNIFICANT CHANGE UP (ref 40–120)
ALT FLD-CCNC: 20 U/L — SIGNIFICANT CHANGE UP (ref 12–78)
ANION GAP SERPL CALC-SCNC: 6 MMOL/L — SIGNIFICANT CHANGE UP (ref 5–17)
AST SERPL-CCNC: 27 U/L — SIGNIFICANT CHANGE UP (ref 15–37)
BILIRUB SERPL-MCNC: 0.5 MG/DL — SIGNIFICANT CHANGE UP (ref 0.2–1.2)
BUN SERPL-MCNC: 22 MG/DL — SIGNIFICANT CHANGE UP (ref 7–23)
CALCIUM SERPL-MCNC: 9.2 MG/DL — SIGNIFICANT CHANGE UP (ref 8.5–10.1)
CHLORIDE SERPL-SCNC: 107 MMOL/L — SIGNIFICANT CHANGE UP (ref 96–108)
CO2 SERPL-SCNC: 26 MMOL/L — SIGNIFICANT CHANGE UP (ref 22–31)
CREAT SERPL-MCNC: 0.75 MG/DL — SIGNIFICANT CHANGE UP (ref 0.5–1.3)
GLUCOSE SERPL-MCNC: 106 MG/DL — HIGH (ref 70–99)
HCT VFR BLD CALC: 39.5 % — SIGNIFICANT CHANGE UP (ref 34.5–45)
HGB BLD-MCNC: 13.4 G/DL — SIGNIFICANT CHANGE UP (ref 11.5–15.5)
MAGNESIUM SERPL-MCNC: 2.2 MG/DL — SIGNIFICANT CHANGE UP (ref 1.6–2.6)
MCHC RBC-ENTMCNC: 29.5 PG — SIGNIFICANT CHANGE UP (ref 27–34)
MCHC RBC-ENTMCNC: 33.9 GM/DL — SIGNIFICANT CHANGE UP (ref 32–36)
MCV RBC AUTO: 87 FL — SIGNIFICANT CHANGE UP (ref 80–100)
NRBC # BLD: 0 /100 WBCS — SIGNIFICANT CHANGE UP (ref 0–0)
PLATELET # BLD AUTO: 165 K/UL — SIGNIFICANT CHANGE UP (ref 150–400)
POTASSIUM SERPL-MCNC: 3.9 MMOL/L — SIGNIFICANT CHANGE UP (ref 3.5–5.3)
POTASSIUM SERPL-SCNC: 3.9 MMOL/L — SIGNIFICANT CHANGE UP (ref 3.5–5.3)
PROT SERPL-MCNC: 8 G/DL — SIGNIFICANT CHANGE UP (ref 6–8.3)
RBC # BLD: 4.54 M/UL — SIGNIFICANT CHANGE UP (ref 3.8–5.2)
RBC # FLD: 13.3 % — SIGNIFICANT CHANGE UP (ref 10.3–14.5)
SODIUM SERPL-SCNC: 139 MMOL/L — SIGNIFICANT CHANGE UP (ref 135–145)
WBC # BLD: 7.07 K/UL — SIGNIFICANT CHANGE UP (ref 3.8–10.5)
WBC # FLD AUTO: 7.07 K/UL — SIGNIFICANT CHANGE UP (ref 3.8–10.5)

## 2020-04-13 PROCEDURE — 99232 SBSQ HOSP IP/OBS MODERATE 35: CPT

## 2020-04-13 RX ORDER — AMLODIPINE BESYLATE 2.5 MG/1
5 TABLET ORAL DAILY
Refills: 0 | Status: DISCONTINUED | OUTPATIENT
Start: 2020-04-13 | End: 2020-04-14

## 2020-04-13 RX ORDER — POTASSIUM CHLORIDE 20 MEQ
40 PACKET (EA) ORAL ONCE
Refills: 0 | Status: COMPLETED | OUTPATIENT
Start: 2020-04-13 | End: 2020-04-13

## 2020-04-13 RX ORDER — HYDROCHLOROTHIAZIDE 25 MG
25 TABLET ORAL DAILY
Refills: 0 | Status: DISCONTINUED | OUTPATIENT
Start: 2020-04-13 | End: 2020-04-14

## 2020-04-13 RX ORDER — LISINOPRIL 2.5 MG/1
40 TABLET ORAL DAILY
Refills: 0 | Status: DISCONTINUED | OUTPATIENT
Start: 2020-04-13 | End: 2020-04-14

## 2020-04-13 RX ORDER — ATENOLOL 25 MG/1
100 TABLET ORAL DAILY
Refills: 0 | Status: DISCONTINUED | OUTPATIENT
Start: 2020-04-13 | End: 2020-04-14

## 2020-04-13 RX ADMIN — CEFTRIAXONE 100 MILLIGRAM(S): 500 INJECTION, POWDER, FOR SOLUTION INTRAMUSCULAR; INTRAVENOUS at 04:59

## 2020-04-13 RX ADMIN — ENOXAPARIN SODIUM 40 MILLIGRAM(S): 100 INJECTION SUBCUTANEOUS at 17:27

## 2020-04-13 RX ADMIN — FAMOTIDINE 20 MILLIGRAM(S): 10 INJECTION INTRAVENOUS at 11:09

## 2020-04-13 RX ADMIN — Medication 40 MILLIEQUIVALENT(S): at 17:27

## 2020-04-13 RX ADMIN — ENOXAPARIN SODIUM 40 MILLIGRAM(S): 100 INJECTION SUBCUTANEOUS at 04:59

## 2020-04-13 RX ADMIN — ATORVASTATIN CALCIUM 10 MILLIGRAM(S): 80 TABLET, FILM COATED ORAL at 22:25

## 2020-04-13 RX ADMIN — CLOPIDOGREL BISULFATE 75 MILLIGRAM(S): 75 TABLET, FILM COATED ORAL at 11:09

## 2020-04-13 NOTE — PROGRESS NOTE ADULT - SUBJECTIVE AND OBJECTIVE BOX
Neurology Follow up note    PREET [id number]    HPI:  83 yo female with PMHx of HTN, breast ca (s/p L lumpectomy 2018, currently on herceptin infusions q3 week, last 2 weeks ago) p/w AMS that started suddenly today. Patient is confused so daughter-in-law and son were called. Daughter-in-law states that around 7PM earlier today, when she was dropping off some food, when her mother-in-law answered the door she was noticeably disoriented and did not recognize her daughter-in-law.  Her speech was gibberish, pt was repeating same things, She complains of feeling lightheaded and dizzy, and repeatedly stated "how did you know I was here?" Last normal conversation was at 4:30PM. No recent trauma. Patient was complaining of not feeling well last Friday but attributed it to her chemotherapy. According to daughter, patient was complaining of very vague symptomatology, feeling lightheaded, dizzy with headaches, and nausea and diarrhea. Of note, last echo 3 months ago was normal, and patient has been wearing a pessary for the past few years, complaining of occasional discomfort but no acute changes recently.      ED vitals: T: 99.1 HR: 70, BP: 167/68 RR: 18 O2 Saturation: 98 on RA   Labs: k: 2.7, UA dirty  CT Head: No acute intracranial bleeding, mass effect, or shift. Mild chronic microvascular ischemic changes.   CT chest: Clear Lungs apart from minimal bibasilar dependent atelectasis.  In the ED patient received: 1000ml IVF bolus X1,Potassium 40X1 and Potassium 10 IV X3, IVF 1000 X1, Ceftriaxone X1   UA: Trace Leuk esterases, moderate bacteria,    EKG showing NSR with L axis deviation (10 Apr 2020 02:27)      Interval History - no new complaints    Patient is seen, chart was reviewed and case was discussed with the treatment team.  Pt is not in any distress.   Lying on bed comfortably.   ir bed comfortably.    is at bedside.    Vital Signs Last 24 Hrs  T(C): 36.6 (13 Apr 2020 04:39), Max: 36.9 (12 Apr 2020 21:14)  T(F): 97.9 (13 Apr 2020 04:39), Max: 98.4 (12 Apr 2020 21:14)  HR: 70 (13 Apr 2020 04:39) (66 - 70)  BP: 130/81 (13 Apr 2020 04:39) (130/81 - 144/81)  BP(mean): --  RR: 17 (13 Apr 2020 04:39) (17 - 18)  SpO2: 97% (13 Apr 2020 04:39) (97% - 98%)        REVIEW OF SYSTEMS:    Constitutional: No  weight loss or fatigue  Eyes: No eye pain, visual disturbances,   ENT:  No difficulty hearing, tinnitus, vertigo; No sinus or throat pain  Neck: No pain or stiffness  Respiratory: No cough, wheezing, chills or hemoptysis  Cardiovascular: No chest pain, palpitations, shortness of breath  Gastrointestinal: No abdominal or epigastric pain. No nausea, vomiting or hematemesis;  Genitourinary: No dysuria, frequency, hematuria or incontinence  Neurological: No headaches,  Psychiatric: No d mood swings or difficulty sleeping  Musculoskeletal: No joint pain   Skin: No itching, burning, rashes or lesions   Lymph Nodes: No enlarged glands  Endocrine: No heat or cold intolerance; No hair loss,   Allergy and Immunologic: No hives or eczema    On Neurological Examination:    Mental Status - Pt is alert, awake, oriented X3.  Follows commands well and able to answer questions appropriately  .Mood and affect  normal    Speech -  Normal.     Cranial Nerves - Pupils 3 mm equal and reactive to light, extraocular eye movements intact   Pt has no visual field deficit.  Pt has no facial asymmetry. Facial sensation is intact  .Tongue - is in midline.    Muscle tone - is normal all ove      Motor Exam - 5/5 all over, No drift. No shaking or tremors.    Sensory Exam - Pin prick, temperature, joint position and vibration are intact on either side. Pt withdraws all extremities equally on stimulation. No asymmetry seen. No complaints of tingling, numbness.    Gait - unsteady    Hemiataxia right    coordination:    Finger to nose: normal      Deep tendon Reflexes - 2 plus all over.       Neck Supple -  Yes.       MEDICATIONS  (STANDING):  amLODIPine   Tablet 5 milliGRAM(s) Oral daily  ATENolol  Tablet 100 milliGRAM(s) Oral daily  atorvastatin 10 milliGRAM(s) Oral at bedtime  cefTRIAXone   IVPB 1000 milliGRAM(s) IV Intermittent every 24 hours  clopidogrel Tablet 75 milliGRAM(s) Oral daily  enoxaparin Injectable 40 milliGRAM(s) SubCutaneous two times a day  famotidine    Tablet 20 milliGRAM(s) Oral daily  hydrochlorothiazide 25 milliGRAM(s) Oral daily  lisinopril 40 milliGRAM(s) Oral daily    MEDICATIONS  (PRN):  acetaminophen   Tablet .. 650 milliGRAM(s) Oral every 4 hours PRN Temp greater or equal to 38.5C (101.3F)        Allergies    No Known Allergies    Intolerances                                         13.4   7.07  )-----------( 165      ( 13 Apr 2020 08:16 )             39.5         Hemoglobin A1C: Hemoglobin A1C, Whole Blood: 6.4 % (04-11 @ 10:52)    Lipid Panel 04-11 @ 10:29  Total Cholesterol, Serum 161    Triglycerides 74    Vitamin B12     RADIOLOGY  < from: MR Head No Cont (04.11.20 @ 12:06) >    EXAM:  MR BRAIN                            PROCEDURE DATE:  04/11/2020          INTERPRETATION:  MRI brain without contrast    History altered mental status    Comparison CT performed the prior day    There or multiple tiny foci of edema and matching diffusion restriction involving the deep and peripheral cortical white matter of the cerebellar hemispheres on either side. There is a single small focus involving the medial right thalamus. There is no associated mass effect or hemorrhage. There is mild to moderate chronic microvascular ischemic change in the periventricular white matter. Overall volume is maintained. There is no hydrocephalus. The orbital and sellar contents and cerebellar tonsils are within normal limits.    IMPRESSION:    Multiple tiny acute bland posterior distribution infarcts as above            EDGAR HOOD M.D., ATTENDING RADIOLOGIST  This document has been electronically signed. Apr 11 2020 12:18PM          < from: CT Angio Neck w/ IV Cont (04.11.20 @ 15:14) >    EXAM:  CT ANGIO NECK (W)AW IC                            PROCEDURE DATE:  04/11/2020          INTERPRETATION:  CT angiogram neck with contrast  History CVA, dizziness and altered mental status  Contrast dose: Omnipaque 100cc  Multiplanar MIPS included    Examination of the cervical circulation is negative for carotid or vertebral artery stenosis, occlusion or dissection. The left vertebral artery is anatomically dominant.    IMPRESSION:    Normal arterial findings                EDGAR HOOD M.D., ATTENDING RADIOLOGIST  This document has been electronically signed. 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  OOB to chair/ambulation with assistance only.  Pain is accessed and addressed.  Would continue to follow.

## 2020-04-13 NOTE — PROGRESS NOTE ADULT - ASSESSMENT
83 yo female with PMHx of HTN, breast ca (s/p L lumpectomy 2018, currently on herceptin infusions q3 week, last 2 weeks ago) p/w AMS, admitted for metabolic encephalopathy 2/2 UTI vs. CVA vs. COVID-19, COVID NEGATIVE, found to have acute CVA.

## 2020-04-13 NOTE — PROGRESS NOTE ADULT - PROBLEM SELECTOR PLAN 4
Hypokalemia, 3.9 today, ordered KCl 40 x1 to maintain K > 4  -According to daughter-in-law, patient was not taking KCl tabs every day, last Rx on 11/6/19 for a 3 month supply  -Follow up BMP in AM, replete further as needed

## 2020-04-13 NOTE — PROGRESS NOTE ADULT - SUBJECTIVE AND OBJECTIVE BOX
Patient is a 84y old  Female who presents with a chief complaint of AMS (13 Apr 2020 13:30)    HPI:  83 yo female with PMHx of HTN, breast ca (s/p L lumpectomy 2018, currently on herceptin infusions q3 week, last 2 weeks ago) p/w AMS that started suddenly today. Patient is confused so daughter-in-law and son were called. Daughter-in-law states that around 7PM earlier today, when she was dropping off some food, when her mother-in-law answered the door she was noticeably disoriented and did not recognize her daughter-in-law.  Her speech was gibberish, pt was repeating same things, She complains of feeling lightheaded and dizzy, and repeatedly stated "how did you know I was here?" Last normal conversation was at 4:30PM. No recent trauma. Patient was complaining of not feeling well last Friday but attributed it to her chemotherapy. According to daughter, patient was complaining of very vague symptomatology, feeling lightheaded, dizzy with headaches, and nausea and diarrhea. Of note, last echo 3 months ago was normal, and patient has been wearing a pessary for the past few years, complaining of occasional discomfort but no acute changes recently.      ED vitals: T: 99.1 HR: 70, BP: 167/68 RR: 18 O2 Saturation: 98 on RA   Labs: k: 2.7, UA dirty  CT Head: No acute intracranial bleeding, mass effect, or shift. Mild chronic microvascular ischemic changes.   CT chest: Clear Lungs apart from minimal bibasilar dependent atelectasis.  In the ED patient received: 1000ml IVF bolus X1,Potassium 40X1 and Potassium 10 IV X3, IVF 1000 X1, Ceftriaxone X1   UA: Trace Leuk esterases, moderate bacteria,    EKG showing NSR with L axis deviation (10 Apr 2020 02:27)    INTERVAL HPI:  4/10/2020: No acute events since admission over night.  Saturating in high 90s on room air.  COVID NEGATIVE.  MR head ordered by neuro. S&S --Regular Diet. K repleted.  4/11/2020: Fever 101.3F at 1800 on 4/10, already on rocephin.  BCx NGTD.  K repleted.  Saturating well on room air. Will get repeat COVID testing in setting of fever and possible exposure. 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  Cardio Dr Tong  and ID Dr Nair consulted.  Started on atorvastatin.  Echo & CD ordered.   Allow for permissive HTN.    4/12/2020: No acute events overnight, afebrile and saturating well on room air. Repeat COVID testing NEGATIVE. K repleted.  Asa switched to plavix.  Patient started on famotidine. CTA neck with no carotid stenosis and echo showed LVEF 60%, no acute pathology.  4/13/2020: Pt seen and examined at bedside. Sat 97% on RA.    OVERNIGHT EVENTS: None    Home Medications:  amLODIPine 5 mg oral tablet: 1 tab(s) orally once a day (10 Apr 2020 02:56)  atenolol 100 mg oral tablet: 1 tab(s) orally once a day (10 Apr 2020 03:00)  Herceptin: every 3 weeks  (10 Apr 2020 03:00)  hydroCHLOROthiazide 25 mg oral tablet: 1 tab(s) orally once a day (10 Apr 2020 02:56)  lisinopril 40 mg oral tablet: 1 tab(s) orally once a day (10 Apr 2020 03:00)  potassium chloride 10 mEq oral capsule, extended release: orally once a day (10 Apr 2020 03:00)      MEDICATIONS  (STANDING):  amLODIPine   Tablet 5 milliGRAM(s) Oral daily  ATENolol  Tablet 100 milliGRAM(s) Oral daily  atorvastatin 10 milliGRAM(s) Oral at bedtime  cefTRIAXone   IVPB 1000 milliGRAM(s) IV Intermittent every 24 hours  clopidogrel Tablet 75 milliGRAM(s) Oral daily  enoxaparin Injectable 40 milliGRAM(s) SubCutaneous two times a day  famotidine    Tablet 20 milliGRAM(s) Oral daily  hydrochlorothiazide 25 milliGRAM(s) Oral daily  lisinopril 40 milliGRAM(s) Oral daily    MEDICATIONS  (PRN):  acetaminophen   Tablet .. 650 milliGRAM(s) Oral every 4 hours PRN Temp greater or equal to 38.5C (101.3F)      No Known Allergies      Social History:  , lives alone. ADLs independent, Walks independently, never smoker, denies alcohol use, denies recreational drug use. Flu vaccine up to date. (10 Apr 2020 02:27)      REVIEW OF SYSTEMS:  CONSTITUTIONAL: No fever, No chills, No fatigue, admits to myalgia, No Body ache, No Weakness  EYES: No eye pain,  No visual disturbances, No discharge, No Redness  ENMT: No ear pain, No nose bleed, No vertigo; No sinus or throat pain, No Congestion  NECK: No pain, No stiffness  RESPIRATORY: no cough, No wheezing, No hemoptysis, no shortness of breath  CARDIOVASCULAR: No chest pain, admits to palpitations  GASTROINTESTINAL: No abdominal or epigastric pain. No nausea, No vomiting, No diarrhea or constipation; [  ] BM  GENITOURINARY: No dysuria, No frequency, No urgency, No hematuria or incontinence  NEUROLOGICAL: No headaches, No dizziness, No numbness, No tingling, No tremors, No weakness  EXT: No Swelling, No Pain, No Edema  SKIN: [ X ] No itching, burning, rashes, or lesions   MUSCULOSKELETAL: No joint pain or swelling; No muscle pain, No back pain, No extremity pain  PSYCHIATRIC: No depression, anxiety, mood swings or difficulty sleeping at night  PAIN SCALE: [X  ] None  [  ] Other-  ROS Unable to obtain due to: [  ] Dementia  [  ] Lethargy  [  ] Sedated  [  ]  non verbal [X ] AMS  REST OF REVIEW OF SYSTEMS: [ x ] Normal      Vital Signs Last 24 Hrs  T(C): 36.9 (13 Apr 2020 13:45), Max: 36.9 (12 Apr 2020 21:14)  T(F): 98.5 (13 Apr 2020 13:45), Max: 98.5 (13 Apr 2020 13:45)  HR: 75 (13 Apr 2020 13:45) (66 - 75)  BP: 115/80 (13 Apr 2020 13:45) (115/80 - 144/81)  BP(mean): --  RR: 17 (13 Apr 2020 13:45) (17 - 18)  SpO2: 97% (13 Apr 2020 13:45) (97% - 98%)      PHYSICAL EXAM:  GENERAL:  [ X ] NAD, [X  ] Well appearing, [  ] Agitated, [  ] Drowsy, [  ] Lethargy, [  ] Confused   HEAD:  [X  ] Normal, [  ] Other  EYES:  [ X ] EOMI, [X  ] PERRLA, [X  ] Conjunctiva and sclera clear normal, [  ] Other, [  ] Pallor, [  ] Discharge  ENMT:  [ X ] Normal, [ X ] Moist mucous membranes, [ X ] Good dentition, [  ] No thrush  NECK:  [ X ] Supple, [X  ] No JVD, [ x ] Normal thyroid, [  ] Lymphadenopathy, [  ] Other  CHEST/LUNG:  [X  ] Clear to auscultation bilaterally, [ x ] Breath Sounds equal B/L decreased, [X  ] No rales, [ X ] No rhonchi, [X  ] No wheezing  HEART:  [ X ] Regular rate and rhythm, [  ] Tachycardia, [  ] Bradycardia, [  ] Irregular, [X  ] No murmurs, No rubs, No gallops, [  ] PPM in place (Mfr:  )  ABDOMEN:  [ X ] Soft, [ X ] Nontender, [ X ] Nondistended, [X  ] No mass, [X  ] Bowel sounds present, [x  ] Obese  NERVOUS SYSTEM:  [ X ] Alert & Oriented x3, [X  ] Nonfocal, [  ] Confusion, [  ] Encephalopathic, [  ] Sedated, [  ] Unable to assess, [  ] Other-  EXTREMITIES:  [ X ] 2+ Peripheral Pulses, No clubbing, No cyanosis,  [  ] edema B/L lower EXT, [  ] PVD stasis skin changes B/L lower EXT  LYMPH:  No lymphadenopathy noted  SKIN:  [X  ] No rashes or lesions, [  ] Pressure ulcers, [  ] Ecchymosis, [  ] Skin tears, [  ] Other    DIET: Diet, DASH/TLC:   Sodium & Cholesterol Restricted (04-10-20 @ 12:41)      LABS:                        13.4   7.07  )-----------( 165      ( 13 Apr 2020 08:16 )             39.5     13 Apr 2020 08:16    139    |  107    |  22     ----------------------------<  106    3.9     |  26     |  0.75     Ca    9.2        13 Apr 2020 08:16  Mg     2.2       13 Apr 2020 08:16    TPro  8.0    /  Alb  3.4    /  TBili  0.5    /  DBili  x      /  AST  27     /  ALT  20     /  AlkPhos  68     13 Apr 2020 08:16      CARDIAC MARKERS ( 09 Apr 2020 21:56 )  <.015 ng/mL / x     / x     / x     / x        Culture - Urine (collected 11 Apr 2020 20:40)  Source: .Urine Catheterized  Final Report (12 Apr 2020 18:35):    No growth    Culture - Blood (collected 10 Apr 2020 00:22)  Source: .Blood Blood-Venous  Preliminary Report (11 Apr 2020 01:03):    No growth to date.      Thyroid Panel:  Thyroid Stimulating Hormone, Serum: 1.16 uIU/mL (04-11-20 @ 06:07)    Lipase, Serum: 215 U/L (04-09-20 @ 21:56)      RADIOLOGY & ADDITIONAL TESTS: No new test      HEALTH ISSUES - PROBLEM Dx:  Fever: Fever  CVA (cerebral vascular accident): CVA (cerebral vascular accident)  Breast cancer: Breast cancer  Advanced directives, counseling/discussion: Advanced directives, counseling/discussion  Hypokalemia: Hypokalemia  Need for prophylactic measure: Need for prophylactic measure  HTN (hypertension): HTN (hypertension)  Metabolic encephalopathy: Metabolic encephalopathy      Consultant(s) Notes Reviewed:  [X  ] YES     Care Discussed with [ x ] Consultants, [ X ] Patient, [  ] Family, [X  ] , [ X ] Social Service, [X  ] RN, [  ] Physical Therapy  DVT PPX: [X  ] Lovenox, [  ] S C Heparin, [  ] Coumadin, [  ] Xarelto, [  ] Eliquis, [  ] Pradaxa, [  ] IV Heparin drip, [  ] SCD, [  ] Contraindication secondary to GI Bleed, [  ] Ambulation  Advanced Directive: [X  ] None, [  ] DNR/DNI Patient is a 84y old  Female who presents with a chief complaint of AMS (13 Apr 2020 13:30)    HPI:  83 yo female with PMHx of HTN, breast ca (s/p L lumpectomy 2018, currently on herceptin infusions q3 week, last 2 weeks ago) p/w AMS that started suddenly today. Patient is confused so daughter-in-law and son were called. Daughter-in-law states that around 7PM earlier today, when she was dropping off some food, when her mother-in-law answered the door she was noticeably disoriented and did not recognize her daughter-in-law.  Her speech was gibberish, pt was repeating same things, She complains of feeling lightheaded and dizzy, and repeatedly stated "how did you know I was here?" Last normal conversation was at 4:30PM. No recent trauma. Patient was complaining of not feeling well last Friday but attributed it to her chemotherapy. According to daughter, patient was complaining of very vague symptomatology, feeling lightheaded, dizzy with headaches, and nausea and diarrhea. Of note, last echo 3 months ago was normal, and patient has been wearing a pessary for the past few years, complaining of occasional discomfort but no acute changes recently.      ED vitals: T: 99.1 HR: 70, BP: 167/68 RR: 18 O2 Saturation: 98 on RA   Labs: k: 2.7, UA dirty  CT Head: No acute intracranial bleeding, mass effect, or shift. Mild chronic microvascular ischemic changes.   CT chest: Clear Lungs apart from minimal bibasilar dependent atelectasis.  In the ED patient received: 1000ml IVF bolus X1,Potassium 40X1 and Potassium 10 IV X3, IVF 1000 X1, Ceftriaxone X1   UA: Trace Leuk esterases, moderate bacteria,    EKG showing NSR with L axis deviation (10 Apr 2020 02:27)    INTERVAL HPI:  4/10/2020: No acute events since admission over night.  Saturating in high 90s on room air.  COVID NEGATIVE.  MR head ordered by neuro. S&S --Regular Diet. K repleted.  4/11/2020: Fever 101.3F at 1800 on 4/10, already on rocephin.  BCx NGTD.  K repleted.  Saturating well on room air. Will get repeat COVID testing in setting of fever and possible exposure. 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  Cardio Dr Tong  and ID Dr Nair consulted.  Started on atorvastatin.  Echo & CD ordered.   Allow for permissive HTN.    4/12/2020: No acute events overnight, afebrile and saturating well on room air. Repeat COVID testing NEGATIVE. K repleted.  Asa switched to plavix.  Patient started on famotidine. CTA neck with no carotid stenosis and echo showed LVEF 60%, no acute pathology.  4/13/2020: Pt seen and examined at bedside. Sat 97% on RA. D/W Cardiology- plan for CT chest with Contrast to look for Left atrial thrombus, as d/w cardiology    OVERNIGHT EVENTS: None    Home Medications:  amLODIPine 5 mg oral tablet: 1 tab(s) orally once a day (10 Apr 2020 02:56)  atenolol 100 mg oral tablet: 1 tab(s) orally once a day (10 Apr 2020 03:00)  Herceptin: every 3 weeks  (10 Apr 2020 03:00)  hydroCHLOROthiazide 25 mg oral tablet: 1 tab(s) orally once a day (10 Apr 2020 02:56)  lisinopril 40 mg oral tablet: 1 tab(s) orally once a day (10 Apr 2020 03:00)  potassium chloride 10 mEq oral capsule, extended release: orally once a day (10 Apr 2020 03:00)      MEDICATIONS  (STANDING):  amLODIPine   Tablet 5 milliGRAM(s) Oral daily  ATENolol  Tablet 100 milliGRAM(s) Oral daily  atorvastatin 10 milliGRAM(s) Oral at bedtime  cefTRIAXone   IVPB 1000 milliGRAM(s) IV Intermittent every 24 hours  clopidogrel Tablet 75 milliGRAM(s) Oral daily  enoxaparin Injectable 40 milliGRAM(s) SubCutaneous two times a day  famotidine    Tablet 20 milliGRAM(s) Oral daily  hydrochlorothiazide 25 milliGRAM(s) Oral daily  lisinopril 40 milliGRAM(s) Oral daily    MEDICATIONS  (PRN):  acetaminophen   Tablet .. 650 milliGRAM(s) Oral every 4 hours PRN Temp greater or equal to 38.5C (101.3F)      No Known Allergies      Social History:  , lives alone. ADLs independent, Walks independently, never smoker, denies alcohol use, denies recreational drug use. Flu vaccine up to date. (10 Apr 2020 02:27)      REVIEW OF SYSTEMS: i am feeling OK  CONSTITUTIONAL: No fever, No chills, No fatigue, admits to myalgia, No Body ache, No Weakness  EYES: No eye pain,  No visual disturbances, No discharge, No Redness  ENMT: No ear pain, No nose bleed, No vertigo; No sinus or throat pain, No Congestion  NECK: No pain, No stiffness  RESPIRATORY: no cough, No wheezing, No hemoptysis, no shortness of breath  CARDIOVASCULAR: No chest pain, admits to palpitations  GASTROINTESTINAL: No abdominal or epigastric pain. No nausea, No vomiting, No diarrhea or constipation; [  ] BM  GENITOURINARY: No dysuria, No frequency, No urgency, No hematuria or incontinence  NEUROLOGICAL: No headaches, No dizziness, No numbness, No tingling, No tremors, No weakness  EXT: No Swelling, No Pain, No Edema  SKIN: [ X ] No itching, burning, rashes, or lesions   MUSCULOSKELETAL: No joint pain or swelling; No muscle pain, No back pain, No extremity pain  PSYCHIATRIC: No depression, anxiety, mood swings or difficulty sleeping at night  PAIN SCALE: [X  ] None  [  ] Other-  ROS Unable to obtain due to: [  ] Dementia  [  ] Lethargy  [  ] Sedated  [  ]  non verbal [X ] AMS  REST OF REVIEW OF SYSTEMS: [ x ] Normal      Vital Signs Last 24 Hrs  T(C): 36.9 (13 Apr 2020 13:45), Max: 36.9 (12 Apr 2020 21:14)  T(F): 98.5 (13 Apr 2020 13:45), Max: 98.5 (13 Apr 2020 13:45)  HR: 75 (13 Apr 2020 13:45) (66 - 75)  BP: 115/80 (13 Apr 2020 13:45) (115/80 - 144/81)  BP(mean): --  RR: 17 (13 Apr 2020 13:45) (17 - 18)  SpO2: 97% (13 Apr 2020 13:45) (97% - 98%)      PHYSICAL EXAM:  GENERAL:  [ X ] NAD, [X  ] Well appearing, [  ] Agitated, [  ] Drowsy, [  ] Lethargy, [  ] Confused   HEAD:  [X  ] Normal, [  ] Other  EYES:  [ X ] EOMI, [X  ] PERRLA, [X  ] Conjunctiva and sclera clear normal, [  ] Other, [  ] Pallor, [  ] Discharge  ENMT:  [ X ] Normal, [ X ] Moist mucous membranes, [ X ] Good dentition, [  ] No thrush  NECK:  [ X ] Supple, [X  ] No JVD, [ x ] Normal thyroid, [  ] Lymphadenopathy, [  ] Other  CHEST/LUNG:  [X  ] Clear to auscultation bilaterally, [ x ] Breath Sounds equal B/L decreased, [X  ] No rales, [ X ] No rhonchi, [X  ] No wheezing  HEART:  [ X ] Regular rate and rhythm, [  ] Tachycardia, [  ] Bradycardia, [  ] Irregular, [X  ] No murmurs, No rubs, No gallops, [  ] PPM in place (Mfr:  )  ABDOMEN:  [ X ] Soft, [ X ] Nontender, [ X ] Nondistended, [X  ] No mass, [X  ] Bowel sounds present, [x  ] Obese  NERVOUS SYSTEM:  [ X ] Alert & Oriented x3, [X  ] Nonfocal, [  ] Confusion, [  ] Encephalopathic, [  ] Sedated, [  ] Unable to assess, [  ] Other-  EXTREMITIES:  [ X ] 2+ Peripheral Pulses, No clubbing, No cyanosis,  [  ] edema B/L lower EXT, [  ] PVD stasis skin changes B/L lower EXT  LYMPH:  No lymphadenopathy noted  SKIN:  [X  ] No rashes or lesions, [  ] Pressure ulcers, [  ] Ecchymosis, [  ] Skin tears, [  ] Other    DIET: Diet, DASH/TLC:   Sodium & Cholesterol Restricted (04-10-20 @ 12:41)      LABS:                        13.4   7.07  )-----------( 165      ( 13 Apr 2020 08:16 )             39.5     13 Apr 2020 08:16    139    |  107    |  22     ----------------------------<  106    3.9     |  26     |  0.75     Ca    9.2        13 Apr 2020 08:16  Mg     2.2       13 Apr 2020 08:16    TPro  8.0    /  Alb  3.4    /  TBili  0.5    /  DBili  x      /  AST  27     /  ALT  20     /  AlkPhos  68     13 Apr 2020 08:16      CARDIAC MARKERS ( 09 Apr 2020 21:56 )  <.015 ng/mL / x     / x     / x     / x        Culture - Urine (collected 11 Apr 2020 20:40)  Source: .Urine Catheterized  Final Report (12 Apr 2020 18:35):    No growth    Culture - Blood (collected 10 Apr 2020 00:22)  Source: .Blood Blood-Venous  Preliminary Report (11 Apr 2020 01:03):    No growth to date.      Thyroid Panel:  Thyroid Stimulating Hormone, Serum: 1.16 uIU/mL (04-11-20 @ 06:07)    Lipase, Serum: 215 U/L (04-09-20 @ 21:56)      RADIOLOGY & ADDITIONAL TESTS: No new test      HEALTH ISSUES - PROBLEM Dx:  Fever: Fever  CVA (cerebral vascular accident): CVA (cerebral vascular accident)  Breast cancer: Breast cancer  Advanced directives, counseling/discussion: Advanced directives, counseling/discussion  Hypokalemia: Hypokalemia  Need for prophylactic measure: Need for prophylactic measure  HTN (hypertension): HTN (hypertension)  Metabolic encephalopathy: Metabolic encephalopathy      Consultant(s) Notes Reviewed:  [X  ] YES     Care Discussed with [ x ] Consultants, [ X ] Patient, [  ] Family, [X  ] , [ X ] Social Service, [X  ] RN, [  ] Physical Therapy  DVT PPX: [X  ] Lovenox, [  ] S C Heparin, [  ] Coumadin, [  ] Xarelto, [  ] Eliquis, [  ] Pradaxa, [  ] IV Heparin drip, [  ] SCD, [  ] Contraindication secondary to GI Bleed, [  ] Ambulation  Advanced Directive: [X  ] None, [  ] DNR/DNI

## 2020-04-13 NOTE — PROGRESS NOTE ADULT - PROBLEM SELECTOR PLAN 1
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 Cooper  -Cardio, [facility], following  -May consider loop recorder and MOSES as outpatient, cardio deferring MOSES for now

## 2020-04-13 NOTE — PROGRESS NOTE ADULT - PROBLEM SELECTOR PLAN 7
Had extensive discussion with daughter, daughter-in-law, and son  -son and daughter are HCP, MOLST form is in patient's home, son states that he will find the MOLST form (unsure whether or not patient is DNR/DNI)

## 2020-04-13 NOTE — PROGRESS NOTE ADULT - SUBJECTIVE AND OBJECTIVE BOX
[facility], Division of Infectious Diseases  [place]  [phone]    Name: PREET CHUA  Age: 84y  Gender: Female  MRN: [id number]    Interval History--  Notes reviewed  no new events      Past Medical History--  Breast cancer  Hypertension  H/O lumpectomy        For details regarding the patient's social history, family history, and other miscellaneous elements, please refer the initial infectious diseases consultation and/or the admitting history and physical examination for this admission.    Allergies    No Known Allergies    Intolerances        Medications--  Antibiotics:  cefTRIAXone   IVPB 1000 milliGRAM(s) IV Intermittent every 24 hours    Immunologic:    Other:  acetaminophen   Tablet .. PRN  amLODIPine   Tablet  ATENolol  Tablet  atorvastatin  clopidogrel Tablet  enoxaparin Injectable  famotidine    Tablet  hydrochlorothiazide  lisinopril  potassium chloride    Tablet ER      Review of Systems--  A 10-point review of systems was obtained.     Pertinent positives and negatives--  Constitutional: No fevers. No Chills. No Rigors.   Cardiovascular: No chest pain. No palpitations.  Respiratory: No shortness of breath. No cough.  Gastrointestinal: No nausea or vomiting. No diarrhea or constipation.   Psychiatric: no depression  Review of systems otherwise negative except as previously noted.    Physical Examination--  Vital Signs: T(F): 98.5 (04-13-20 @ 13:45), Max: 98.5 (04-13-20 @ 13:45)  HR: 75 (04-13-20 @ 13:45)  BP: 115/80 (04-13-20 @ 13:45)  RR: 17 (04-13-20 @ 13:45)  SpO2: 97% (04-13-20 @ 13:45)  Wt(kg): --  General: Nontoxic-appearing Female in no acute distress.  HEENT: AT/NC.Anicteric. ir.  Neck: Not rigid. No sense of mass.  Nodes: None palpable.  Lungs: Clear bilaterally without rales,   Heart: Regular rate and rhythm.  Abdomen: Bowel sounds present and normoactive. Soft. Nondistended.   Extremities: No cyanosis or clubbing. No edema.   Skin: Warm. Dry. Good turgor. No rash. No vasculitic stigmata.  Psychiatric: Appropriate affect and mood for situation.         Laboratory Studies--  CBC                        13.4   7.07  )-----------( 165      ( 13 Apr 2020 08:16 )             39.5       Chemistries  04-13    139  |  107  |  22  ----------------------------<  106<H>  3.9   |  26  |  0.75    Ca    9.2      13 Apr 2020 08:16  Mg     2.2     04-13    TPro  8.0  /  Alb  3.4  /  TBili  0.5  /  DBili  x   /  AST  27  /  ALT  20  /  AlkPhos  68  04-13      Culture Data    Culture - Urine (collected 11 Apr 2020 20:40)  Source: .Urine Catheterized  Final Report (12 Apr 2020 18:35):    No growth    Culture - Blood (collected 10 Apr 2020 00:22)  Source: .Blood Blood-Venous  Preliminary Report (11 Apr 2020 01:03):    No growth to date.    Culture - Blood (collected 10 Apr 2020 00:22)  Source: .Blood Blood-Venous  Preliminary Report (11 Apr 2020 01:03):    No growth to date.          < from: CT Angio Neck w/ IV Cont (04.11.20 @ 15:14) >    EXAM:  CT ANGIO NECK (W)AW IC                            PROCEDURE DATE:  04/11/2020          INTERPRETATION:  CT angiogram neck with contrast  History CVA, dizziness and altered mental status  Contrast dose: Omnipaque 100cc  Multiplanar MIPS included    Examination of the cervical circulation is negative for carotid or vertebral artery stenosis, occlusion or dissection. The left vertebral artery is anatomically dominant.    IMPRESSION:    Normal arterial findings    < end of copied text >        < from: TTE Echo Complete w/o contrast w/ Doppler (04.11.20 @ 14:46) >    Dimensions:    LA 4.2       Normal Values: 2.0 - 4.0 cm    Ao 3.3        Normal Values: 2.0 - 3.8 cm  SEPTUM 1.3       Normal Values: 0.6 - 1.2 cm  PWT 1.3       Normal Values: 0.6 - 1.1 cm  LVIDd 4.4         Normal Values: 3.0 - 5.6 cm  LVIDs 3.1         Normal Values: 1.8 - 4.0 cm      OBSERVATIONS:  Technically difficult study  Mitral Valve: normal, trace physiologic MR.  Aortic Valve/Aorta: Sclerotic trileaflet aortic valve with normal opening.  Tricuspid Valve: normal with trace TR.  Pulmonic Valve: Trace PI  Left Atrium: Enlarged  Right Atrium: Not well-visualized  Mobile interatrial septum  Left Ventricle: normal LV size and systolic function, estimated LVEF of 60%. Mild LVH  Right Ventricle: Grossly normal size and systolic function.  Pericardium/Pleura: normal, no significant pericardial effusion.  Pulmonary/RV Pressure: estimated PA systolic pressure of 34 mmHg     Conclusion:   Technically difficult study  Mild concentric LVH with normal internal dimensions and systolic function, estimated LVEF of 60%.   Grossly normal RV size and systolic function.   Left atrial enlargement  Sclerotic trileaflet aortic valve, without AI.   Trace physiologic MR and TR.            < end of copied text >

## 2020-04-13 NOTE — PROGRESS NOTE ADULT - PROBLEM SELECTOR PLAN 8
- Lovenox 40 mg BID   - PT eval- no PT needs    IMPROVE VTE Individual Risk Assessment  RISK                                                                Points  [  ] Previous VTE                                                  3  [  ] Thrombophilia                                               2  [  ] Lower limb paralysis                                      2        (unable to hold up >15 seconds)    [ x ] Current Cancer                                              2         (within 6 months)  [  ] Immobilization > 24 hrs                                1  [  ] ICU/CCU stay > 24 hours                              1  [ x ] Age > 60                                                      1  IMPROVE VTE Score: 3    IMPROVE Score 0-1: Low Risk, No VTE prophylaxis required for most patients, encourage ambulation.   IMPROVE Score 2-3: At risk, pharmacologic VTE prophylaxis is indicated for most patients (in the absence of a contraindication)  IMPROVE Score > or = 4: High Risk, pharmacologic VTE prophylaxis is indicated for most patients (in the absence of a contraindication)

## 2020-04-13 NOTE — PROGRESS NOTE ADULT - PROBLEM SELECTOR PLAN 3
? Etiology, remains afebrile  -F/u repeat BCx x2 4/12 per ID Dr Nair, concern for possible endocarditis  -On Rocephin for r/o UTI-Doubt UTI, BCx NGTD, repeat UCx negative, continue for 3-5 days as per ID (today is day 4)  -COVID PCR negative x2

## 2020-04-13 NOTE — PROGRESS NOTE ADULT - PROBLEM SELECTOR PLAN 5
-Continue Amlodipine 5 mg daily, Atenolol 100 mg daily, HCTZ 25 mg daily, Lisinopril 20 mg daily.  -S/p period of permissive HTN

## 2020-04-13 NOTE — PROGRESS NOTE ADULT - ASSESSMENT
85 yo female with PMHx of HTN, breast ca (s/p L lumpectomy 2018, currently on herceptin infusions q3 week, last 2 weeks ago) p/w AMS, admitted for UTI vs COVID-19 vs CVA. COVID NEGATIVE.    Cerebral vascular accident  - MRI shows multiple tiny acute bland posterior distribution infarcts   - Follow neurology recs  - Permissive HTN as per neurology   - Continue with aspirin  - Continue with Lipitor  - Patient spiked fever after admission, no more fevers, blood culture NGD. Will defer MOSES for now   - Patient was on remote tele. Noted to be in NSR. No occult arrhythmias contributing to stroke noted.   - May discontinue telemetry.   - ECHO from 10/2019 showed MAC, min MR, Mod dil LA, EF 70%, Mild diastolic dysfunction, mild pulm HTN. Repeat ECHO done which showed mild concentric LVH,  LVEF of 60%, LA enlargement, Trace physiologic MR and TR  - EKG showed T wave flattening initially which is now resolving. Repeat EKG showed NSR @ 62. TWI V1-V6  - Had normal stress 12/2018    Hypertension  - Continue with amlodipine, atenolol, HCTZ, lisinopril at home  - Monitor routine hemodynamics    Hypokalemia   - Resolved   - Monitor and replete lytes, keep K>4, Mg>2.  - EKG shows T wave flattening initially which is improving     DVT prophylaxis   - Lovenox 40 mg BID per primary     - Monitor and replete lytes, keep K>4, Mg>2.  - All other medical needs as per primary team.  - Other cardiovascular workup will depend on clinical course.  - Will continue to follow.      Vignesh Willis, MS FNP, AGAP  Nurse Practitioner- Cardiology   Spectra #[phone] 85 yo female with PMHx of HTN, breast ca (s/p L lumpectomy 2018, currently on herceptin infusions q3 week, last 2 weeks ago) p/w AMS, admitted for UTI vs COVID-19 vs CVA. COVID NEGATIVE.    Cerebral vascular accident  - MRI shows multiple tiny acute bland posterior distribution infarcts   - Follow neurology recs  - Permissive HTN as per neurology   - Continue with plavix  - Continue with Lipitor  - Patient spiked fever after admission, no more fevers, blood culture NGD. Will defer MOSES for now   - Patient was on remote tele. Noted to be in NSR. No occult arrhythmias contributing to stroke noted.   - May discontinue telemetry.   - ECHO from 10/2019 showed MAC, min MR, Mod dil LA, EF 70%, Mild diastolic dysfunction, mild pulm HTN. Repeat ECHO done which showed mild concentric LVH,  LVEF of 60%, LA enlargement, Trace physiologic MR and TR  - EKG showed T wave flattening initially which is now resolving. Repeat EKG showed NSR @ 62. TWI V1-V6  - Had normal stress 12/2018    Hypertension  - Continue with amlodipine, atenolol, HCTZ, lisinopril at home  - Monitor routine hemodynamics    Hypokalemia   - Resolved   - Monitor and replete lytes, keep K>4, Mg>2.  - EKG shows T wave flattening initially which is improving     DVT prophylaxis   - Lovenox 40 mg BID per primary     - Monitor and replete lytes, keep K>4, Mg>2.  - All other medical needs as per primary team.  - Other cardiovascular workup will depend on clinical course.  - Will continue to follow.      Vignesh Willis, MS FNP, AGAP  Nurse Practitioner- Cardiology   Spectra #[phone]

## 2020-04-13 NOTE — PROGRESS NOTE ADULT - PROBLEM SELECTOR PLAN 6
S/P left Breast Lumpectomy. S/P RT.  -Now on Herceptin Rx q 3 weekly with Dr Esparza 10/12. Rx completed as per Dtr in Law.

## 2020-04-13 NOTE — PROGRESS NOTE ADULT - SUBJECTIVE AND OBJECTIVE BOX
[facility] -- Shanell Martinez, Danae, [place], Trino Matias Savella, [place]: Office # [phone]    Follow Up:  Stroke    Subjective/Observations: Patient seen and examined. Patient sitting on chair. No complaints. Had dizziness yesterday, but resolved today Tolerating room air.     REVIEW OF SYSTEMS: All review of systems is negative for eye, ENT, GI, , allergic, dermatologic, musculoskeletal and neurologic except as described above    PAST MEDICAL & SURGICAL HISTORY:  Breast cancer  Hypertension  Hypertension  H/O lumpectomy: left    MEDICATIONS  (STANDING):  amLODIPine   Tablet 5 milliGRAM(s) Oral daily  ATENolol  Tablet 100 milliGRAM(s) Oral daily  atorvastatin 10 milliGRAM(s) Oral at bedtime  cefTRIAXone   IVPB 1000 milliGRAM(s) IV Intermittent every 24 hours  clopidogrel Tablet 75 milliGRAM(s) Oral daily  enoxaparin Injectable 40 milliGRAM(s) SubCutaneous two times a day  famotidine    Tablet 20 milliGRAM(s) Oral daily  hydrochlorothiazide 25 milliGRAM(s) Oral daily  lisinopril 40 milliGRAM(s) Oral daily    MEDICATIONS  (PRN):  acetaminophen   Tablet .. 650 milliGRAM(s) Oral every 4 hours PRN Temp greater or equal to 38.5C (101.3F)    Allergies  No Known Allergies    Vital Signs Last 24 Hrs  T(C): 36.6 (13 Apr 2020 04:39), Max: 36.9 (12 Apr 2020 12:57)  T(F): 97.9 (13 Apr 2020 04:39), Max: 98.5 (12 Apr 2020 12:57)  HR: 70 (13 Apr 2020 04:39) (65 - 70)  BP: 130/81 (13 Apr 2020 04:39) (130/81 - 147/77)  BP(mean): --  RR: 17 (13 Apr 2020 04:39) (17 - 18)  SpO2: 97% (13 Apr 2020 04:39) (96% - 98%)    I&O's Summary      TELE: NSR 50-80s. No Afib   PHYSICAL EXAM:  Appearance: NAD, no distress, alert,  HEENT: Moist Mucous Membranes, Anicteric  Cardiovascular: Regular rate and rhythm, Normal S1 S2, No JVD, No murmurs, No rubs, gallops or clicks  Respiratory: Non-labored, Clear to auscultation, No rales, No rhonchi, No wheezing.   Gastrointestinal:  Soft, Non-tender, + BS  Neurologic: Non-focal, A&Ox3  Skin: Warm and dry, No visible rashes or ulcers, No ecchymosis, No cyanosis  Musculoskeletal: No clubbing, No cyanosis, No joint swelling/tenderness  Psychiatry: Mood & affect appropriate  Lymph: No peripheral edema.     LABS: All Labs Reviewed:                        13.4   7.07  )-----------( 165      ( 13 Apr 2020 08:16 )             39.5                         12.2   8.12  )-----------( 142      ( 12 Apr 2020 08:09 )             35.7                         12.0   8.53  )-----------( 150      ( 11 Apr 2020 06:07 )             34.3     12 Apr 2020 08:09    138    |  104    |  20     ----------------------------<  95     3.4     |  25     |  0.66   11 Apr 2020 06:07    136    |  101    |  14     ----------------------------<  96     3.3     |  29     |  0.81   10 Apr 2020 12:40    135    |  99     |  12     ----------------------------<  119    2.9     |  27     |  0.71     Ca    8.8        12 Apr 2020 08:09  Ca    8.6        11 Apr 2020 06:07  Ca    8.7        10 Apr 2020 12:40  Mg     2.2       12 Apr 2020 08:09  Mg     2.5       11 Apr 2020 06:07    TPro  7.3    /  Alb  3.1    /  TBili  0.5    /  DBili  x      /  AST  22     /  ALT  14     /  AlkPhos  63     12 Apr 2020 08:09  TPro  7.6    /  Alb  3.2    /  TBili  0.6    /  DBili  x      /  AST  19     /  ALT  14     /  AlkPhos  72     10 Apr 2020 12:40    12 Lead ECG:   Ventricular Rate 62 BPM  Atrial Rate 62 BPM  P-R Interval 170 ms  QRS Duration 86 ms  Q-T Interval 408 ms  QTC Calculation(Bezet) 414 ms  P Axis 71 degrees  R Axis -49 degrees  T Axis -22 degrees  Diagnosis Line Normal sinus rhythm  Left axis deviation  T wave abnormality, consider anterolateral ischemia  Abnormal ECG  When compared with ECG of 09-APR-2020 20:40, (Unconfirmed)  Inverted T waves have replaced nonspecific T wave abnormality in Lateral leads  QT has shortened  Confirmed by Oskar Fink MD ([id number]) on 4/10/2020 1:47:57 PM (04-10-20 @ 10:39)    ECHOCARDIOGRAM  < from: TTE Echo Complete w/o contrast w/ Doppler (04.11.20 @ 14:46) >  Dimensions:    LA 4.2       Normal Values: 2.0 - 4.0 cm    Ao 3.3        Normal Values: 2.0 - 3.8 cm  SEPTUM 1.3       Normal Values: 0.6 - 1.2 cm  PWT 1.3       Normal Values: 0.6 - 1.1 cm  LVIDd 4.4         Normal Values: 3.0 - 5.6 cm  LVIDs 3.1         Normal Values: 1.8 - 4.0 cm      OBSERVATIONS:  Technically difficult study  Mitral Valve: normal, trace physiologic MR.  Aortic Valve/Aorta: Sclerotic trileaflet aortic valve with normal opening.  Tricuspid Valve: normal with trace TR.  Pulmonic Valve: Trace PI  Left Atrium: Enlarged  Right Atrium: Not well-visualized  Mobile interatrial septum  Left Ventricle: normal LV size and systolic function, estimated LVEF of 60%. Mild LVH  Right Ventricle: Grossly normal size and systolic function.  Pericardium/Pleura: normal, no significant pericardial effusion.  Pulmonary/RV Pressure: estimated PA systolic pressure of 34 mmHg     Conclusion:   Technically difficult study  Mild concentric LVH with normal internal dimensions and systolic function, estimated LVEF of 60%.   Grossly normal RV size and systolic function.   Left atrial enlargement  Sclerotic trileaflet aortic valve, without AI.   Trace physiologic MR and TR    < from: Xray Chest 1 View-PORTABLE IMMEDIATE (04.09.20 @ 22:08) >  FINDINGS:   The examination is limited by single view portable technique and patient rotation..  The lungs are  grossly clear.  No evidence of a pleural effusion or pneumothorax.   The cardiomediastinal silhouette is suboptimally assessed due to patient rotation.  No acute bony or soft tissue abnormality is recognized.    IMPRESSION:  No radiographic evidence of pneumonia.    < end of copied text >      < from: CT Chest No Cont (04.10.20 @ 01:42) >  FINDINGS:  LUNGS AND AIRWAYS: Patent central airways.  Minimal bibasilar dependent atelectasis. No pulmonary consolidation or infiltrates.  PLEURA: No pleural effusion.  MEDIASTINUM AND SHASHI: No lymphadenopathy.  VESSELS: Within normal limits.  HEART: Heart size is mildly enlarged. No pericardial effusion.  CHEST WALL AND LOWER NECK: 2 cm right thyroid lobe hypodense nodule.  VISUALIZED UPPER ABDOMEN: Small hiatal hernia. Colonic diverticuli.  BONES: Degenerative changes with minimal retrolisthesis of L1 on L2.    IMPRESSION:  Clear Lungs apart from minimal bibasilar dependent atelectasis.    < end of copied text >    < from: MR Angio Head No Cont (04.11.20 @ 12:09) > There is no flow disturbance that would imply stenosis or aneurysm on either side. The anterior, posterior and middle cerebral circulation appears roughly symmetric. Both posterior cerebral arteries are fed from the basilar.    Impression:  Normal study.    < end of copied text >  < from: MR Head No Cont (04.11.20 @ 12:06) >  There or multiple tiny foci of edema and matching diffusion restriction involving the deep and peripheral cortical white matter of the cerebellar hemispheres on either side. There is a single small focus involving the medial right thalamus. There is no associated mass effect or hemorrhage. There is mild to moderate chronic microvascular ischemic change in the periventricular white matter. Overall volume is maintained. There is no hydrocephalus. The orbital and sellar contents and cerebellar tonsils are within normal limits.    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 Gray-white differentiation is maintained.  Mild chronic microvascular ischemic changes are noted.  Ventricles, sulci, and cisterns are normal in size for the patient's age without hydrocephalus. Basal cisterns are patent.   Visualized paranasal sinuses  and mastoid air cells are clear. Calvarium is intact. Mild hyperostosis frontalis interna.    IMPRESSION:  No acute intracranial bleeding, mass effect, or shift. Mild chronic microvascular ischemic changes.

## 2020-04-14 ENCOUNTER — TRANSCRIPTION ENCOUNTER (OUTPATIENT)
Age: 85
End: 2020-04-14

## 2020-04-14 VITALS
RESPIRATION RATE: 18 BRPM | HEART RATE: 66 BPM | TEMPERATURE: 98 F | SYSTOLIC BLOOD PRESSURE: 136 MMHG | DIASTOLIC BLOOD PRESSURE: 84 MMHG | OXYGEN SATURATION: 96 %

## 2020-04-14 LAB
ALBUMIN SERPL ELPH-MCNC: 3.3 G/DL — SIGNIFICANT CHANGE UP (ref 3.3–5)
ALP SERPL-CCNC: 71 U/L — SIGNIFICANT CHANGE UP (ref 40–120)
ALT FLD-CCNC: 27 U/L — SIGNIFICANT CHANGE UP (ref 12–78)
ANION GAP SERPL CALC-SCNC: 6 MMOL/L — SIGNIFICANT CHANGE UP (ref 5–17)
AST SERPL-CCNC: 37 U/L — SIGNIFICANT CHANGE UP (ref 15–37)
BILIRUB SERPL-MCNC: 0.4 MG/DL — SIGNIFICANT CHANGE UP (ref 0.2–1.2)
BUN SERPL-MCNC: 16 MG/DL — SIGNIFICANT CHANGE UP (ref 7–23)
CALCIUM SERPL-MCNC: 9 MG/DL — SIGNIFICANT CHANGE UP (ref 8.5–10.1)
CHLORIDE SERPL-SCNC: 104 MMOL/L — SIGNIFICANT CHANGE UP (ref 96–108)
CO2 SERPL-SCNC: 27 MMOL/L — SIGNIFICANT CHANGE UP (ref 22–31)
CREAT SERPL-MCNC: 0.78 MG/DL — SIGNIFICANT CHANGE UP (ref 0.5–1.3)
CULTURE RESULTS: SIGNIFICANT CHANGE UP
CULTURE RESULTS: SIGNIFICANT CHANGE UP
GLUCOSE SERPL-MCNC: 109 MG/DL — HIGH (ref 70–99)
HCT VFR BLD CALC: 38.2 % — SIGNIFICANT CHANGE UP (ref 34.5–45)
HGB BLD-MCNC: 12.9 G/DL — SIGNIFICANT CHANGE UP (ref 11.5–15.5)
MAGNESIUM SERPL-MCNC: 2 MG/DL — SIGNIFICANT CHANGE UP (ref 1.6–2.6)
MCHC RBC-ENTMCNC: 29.5 PG — SIGNIFICANT CHANGE UP (ref 27–34)
MCHC RBC-ENTMCNC: 33.8 GM/DL — SIGNIFICANT CHANGE UP (ref 32–36)
MCV RBC AUTO: 87.2 FL — SIGNIFICANT CHANGE UP (ref 80–100)
NRBC # BLD: 0 /100 WBCS — SIGNIFICANT CHANGE UP (ref 0–0)
PLATELET # BLD AUTO: 170 K/UL — SIGNIFICANT CHANGE UP (ref 150–400)
POTASSIUM SERPL-MCNC: 3.7 MMOL/L — SIGNIFICANT CHANGE UP (ref 3.5–5.3)
POTASSIUM SERPL-SCNC: 3.7 MMOL/L — SIGNIFICANT CHANGE UP (ref 3.5–5.3)
PROT SERPL-MCNC: 7.8 G/DL — SIGNIFICANT CHANGE UP (ref 6–8.3)
RBC # BLD: 4.38 M/UL — SIGNIFICANT CHANGE UP (ref 3.8–5.2)
RBC # FLD: 13.2 % — SIGNIFICANT CHANGE UP (ref 10.3–14.5)
SODIUM SERPL-SCNC: 137 MMOL/L — SIGNIFICANT CHANGE UP (ref 135–145)
SPECIMEN SOURCE: SIGNIFICANT CHANGE UP
SPECIMEN SOURCE: SIGNIFICANT CHANGE UP
WBC # BLD: 6.66 K/UL — SIGNIFICANT CHANGE UP (ref 3.8–10.5)
WBC # FLD AUTO: 6.66 K/UL — SIGNIFICANT CHANGE UP (ref 3.8–10.5)

## 2020-04-14 PROCEDURE — 81001 URINALYSIS AUTO W/SCOPE: CPT

## 2020-04-14 PROCEDURE — 99232 SBSQ HOSP IP/OBS MODERATE 35: CPT

## 2020-04-14 PROCEDURE — 87086 URINE CULTURE/COLONY COUNT: CPT

## 2020-04-14 PROCEDURE — 70551 MRI BRAIN STEM W/O DYE: CPT

## 2020-04-14 PROCEDURE — 92610 EVALUATE SWALLOWING FUNCTION: CPT

## 2020-04-14 PROCEDURE — 83690 ASSAY OF LIPASE: CPT

## 2020-04-14 PROCEDURE — 70450 CT HEAD/BRAIN W/O DYE: CPT

## 2020-04-14 PROCEDURE — 71045 X-RAY EXAM CHEST 1 VIEW: CPT

## 2020-04-14 PROCEDURE — 70498 CT ANGIOGRAPHY NECK: CPT

## 2020-04-14 PROCEDURE — 80053 COMPREHEN METABOLIC PANEL: CPT

## 2020-04-14 PROCEDURE — 71250 CT THORAX DX C-: CPT

## 2020-04-14 PROCEDURE — 70544 MR ANGIOGRAPHY HEAD W/O DYE: CPT

## 2020-04-14 PROCEDURE — 93306 TTE W/DOPPLER COMPLETE: CPT

## 2020-04-14 PROCEDURE — 93005 ELECTROCARDIOGRAM TRACING: CPT

## 2020-04-14 PROCEDURE — 83735 ASSAY OF MAGNESIUM: CPT

## 2020-04-14 PROCEDURE — 97161 PT EVAL LOW COMPLEX 20 MIN: CPT

## 2020-04-14 PROCEDURE — 85027 COMPLETE CBC AUTOMATED: CPT

## 2020-04-14 PROCEDURE — 84443 ASSAY THYROID STIM HORMONE: CPT

## 2020-04-14 PROCEDURE — 80048 BASIC METABOLIC PNL TOTAL CA: CPT

## 2020-04-14 PROCEDURE — 87635 SARS-COV-2 COVID-19 AMP PRB: CPT

## 2020-04-14 PROCEDURE — 36415 COLL VENOUS BLD VENIPUNCTURE: CPT

## 2020-04-14 PROCEDURE — 87040 BLOOD CULTURE FOR BACTERIA: CPT

## 2020-04-14 PROCEDURE — 83036 HEMOGLOBIN GLYCOSYLATED A1C: CPT

## 2020-04-14 PROCEDURE — 80061 LIPID PANEL: CPT

## 2020-04-14 PROCEDURE — 84484 ASSAY OF TROPONIN QUANT: CPT

## 2020-04-14 PROCEDURE — 82962 GLUCOSE BLOOD TEST: CPT

## 2020-04-14 PROCEDURE — 99285 EMERGENCY DEPT VISIT HI MDM: CPT

## 2020-04-14 PROCEDURE — 83605 ASSAY OF LACTIC ACID: CPT

## 2020-04-14 RX ORDER — APIXABAN 2.5 MG/1
1 TABLET, FILM COATED ORAL
Qty: 60 | Refills: 0
Start: 2020-04-14 | End: 2020-05-13

## 2020-04-14 RX ORDER — ATORVASTATIN CALCIUM 80 MG/1
1 TABLET, FILM COATED ORAL
Qty: 30 | Refills: 0
Start: 2020-04-14 | End: 2020-05-13

## 2020-04-14 RX ORDER — CLOPIDOGREL BISULFATE 75 MG/1
1 TABLET, FILM COATED ORAL
Qty: 30 | Refills: 0
Start: 2020-04-14 | End: 2020-05-13

## 2020-04-14 RX ORDER — TRASTUZUMAB-DKST 420 MG/20ML
0 INJECTION, POWDER, LYOPHILIZED, FOR SOLUTION INTRAVENOUS
Qty: 0 | Refills: 0 | DISCHARGE

## 2020-04-14 RX ORDER — FAMOTIDINE 10 MG/ML
1 INJECTION INTRAVENOUS
Qty: 60 | Refills: 0
Start: 2020-04-14 | End: 2020-06-12

## 2020-04-14 RX ORDER — POTASSIUM CHLORIDE 20 MEQ
0 PACKET (EA) ORAL
Qty: 0 | Refills: 0 | DISCHARGE

## 2020-04-14 RX ORDER — APIXABAN 2.5 MG/1
5 TABLET, FILM COATED ORAL
Refills: 0 | Status: DISCONTINUED | OUTPATIENT
Start: 2020-04-14 | End: 2020-04-14

## 2020-04-14 RX ORDER — POTASSIUM CHLORIDE 20 MEQ
1 PACKET (EA) ORAL
Qty: 30 | Refills: 0
Start: 2020-04-14 | End: 2020-05-13

## 2020-04-14 RX ADMIN — Medication 25 MILLIGRAM(S): at 05:59

## 2020-04-14 RX ADMIN — APIXABAN 5 MILLIGRAM(S): 2.5 TABLET, FILM COATED ORAL at 17:01

## 2020-04-14 RX ADMIN — CEFTRIAXONE 100 MILLIGRAM(S): 500 INJECTION, POWDER, FOR SOLUTION INTRAMUSCULAR; INTRAVENOUS at 05:59

## 2020-04-14 RX ADMIN — ENOXAPARIN SODIUM 40 MILLIGRAM(S): 100 INJECTION SUBCUTANEOUS at 05:59

## 2020-04-14 RX ADMIN — FAMOTIDINE 20 MILLIGRAM(S): 10 INJECTION INTRAVENOUS at 10:22

## 2020-04-14 RX ADMIN — CLOPIDOGREL BISULFATE 75 MILLIGRAM(S): 75 TABLET, FILM COATED ORAL at 10:22

## 2020-04-14 RX ADMIN — LISINOPRIL 40 MILLIGRAM(S): 2.5 TABLET ORAL at 05:59

## 2020-04-14 RX ADMIN — AMLODIPINE BESYLATE 5 MILLIGRAM(S): 2.5 TABLET ORAL at 05:59

## 2020-04-14 RX ADMIN — ATENOLOL 100 MILLIGRAM(S): 25 TABLET ORAL at 05:59

## 2020-04-14 NOTE — PROGRESS NOTE ADULT - ATTENDING COMMENTS
Pt seen, Examined, case & care plan d/w pt, residents at detail.  D/C Home today D/W both sons at detail.  D/W DR Tong, DR Fink, DR Esparza at detail. D/W PMD DR Callahan.  Total d/c care time is 45 minutes.

## 2020-04-14 NOTE — DISCHARGE NOTE NURSING/CASE MANAGEMENT/SOCIAL WORK - PATIENT PORTAL LINK FT
You can access the FollowMyHealth Patient Portal offered by [facility] by registering at the following website: [email or web address]. By joining [employer]’s FollowMyHealth portal, you will also be able to view your health information using other applications (apps) compatible with our system.

## 2020-04-14 NOTE — PROGRESS NOTE ADULT - PROBLEM SELECTOR PLAN 4
Hypokalemia resolved  -According to daughter-in-law, patient was not taking KCl tabs every day, last Rx on 11/6/19 for a 3 month supply  -Follow up BMP, replete further as needed

## 2020-04-14 NOTE — PROGRESS NOTE ADULT - SUBJECTIVE AND OBJECTIVE BOX
[facility] -- Shanell Martinez, Danae, [place], Trino Matias Savella  Office # [phone]      Follow Up:    CVA  Subjective/Observations:   No events overnight resting comfortably in bed.  No complaints of chest pain, dyspnea, or palpitations reported. No signs of orthopnea or PND.     REVIEW OF SYSTEMS: All other review of systems is negative unless indicated above    PAST MEDICAL & SURGICAL HISTORY:  Breast cancer  Hypertension  H/O lumpectomy: left      MEDICATIONS  (STANDING):  amLODIPine   Tablet 5 milliGRAM(s) Oral daily  ATENolol  Tablet 100 milliGRAM(s) Oral daily  atorvastatin 10 milliGRAM(s) Oral at bedtime  cefTRIAXone   IVPB 1000 milliGRAM(s) IV Intermittent every 24 hours  clopidogrel Tablet 75 milliGRAM(s) Oral daily  enoxaparin Injectable 40 milliGRAM(s) SubCutaneous two times a day  famotidine    Tablet 20 milliGRAM(s) Oral daily  hydrochlorothiazide 25 milliGRAM(s) Oral daily  lisinopril 40 milliGRAM(s) Oral daily    MEDICATIONS  (PRN):  acetaminophen   Tablet .. 650 milliGRAM(s) Oral every 4 hours PRN Temp greater or equal to 38.5C (101.3F)      Allergies    No Known Allergies    Intolerances        Vital Signs Last 24 Hrs  T(C): 36.5 (14 Apr 2020 04:52), Max: 36.9 (13 Apr 2020 13:45)  T(F): 97.7 (14 Apr 2020 04:52), Max: 98.5 (13 Apr 2020 13:45)  HR: 73 (14 Apr 2020 04:52) (73 - 80)  BP: 170/82 (14 Apr 2020 04:52) (115/80 - 170/82)  BP(mean): --  RR: 17 (14 Apr 2020 04:52) (17 - 17)  SpO2: 96% (14 Apr 2020 04:52) (96% - 97%)    I&O's Summary        PHYSICAL EXAM:  TELE: SB  Constitutional: NAD, awake and alert, well-developed  HEENT: Moist Mucous Membranes, Anicteric  Pulmonary: Non-labored, breath sounds are clear bilaterally, No wheezing, crackles or rhonchi  Cardiovascular: Regular, S1 and S2 nl, No murmurs, rubs, gallops or clicks  Gastrointestinal: Bowel Sounds present, soft, nontender.   Lymph: No lymphadenopathy. No peripheral edema.  Skin: No visible rashes or ulcers.  Psych:  Mood & affect appropriate    LABS: All Labs Reviewed:                        12.9   6.66  )-----------( 170      ( 14 Apr 2020 09:03 )             38.2                         13.4   7.07  )-----------( 165      ( 13 Apr 2020 08:16 )             39.5                         12.2   8.12  )-----------( 142      ( 12 Apr 2020 08:09 )             35.7     14 Apr 2020 09:03    137    |  104    |  16     ----------------------------<  109    3.7     |  27     |  0.78   13 Apr 2020 08:16    139    |  107    |  22     ----------------------------<  106    3.9     |  26     |  0.75   12 Apr 2020 08:09    138    |  104    |  20     ----------------------------<  95     3.4     |  25     |  0.66     Ca    9.0        14 Apr 2020 09:03  Ca    9.2        13 Apr 2020 08:16  Ca    8.8        12 Apr 2020 08:09  Mg     2.0       14 Apr 2020 09:03  Mg     2.2       13 Apr 2020 08:16  Mg     2.2       12 Apr 2020 08:09    TPro  7.8    /  Alb  3.3    /  TBili  0.4    /  DBili  x      /  AST  37     /  ALT  27     /  AlkPhos  71     14 Apr 2020 09:03  TPro  8.0    /  Alb  3.4    /  TBili  0.5    /  DBili  x      /  AST  27     /  ALT  20     /  AlkPhos  68     13 Apr 2020 08:16  TPro  7.3    /  Alb  3.1    /  TBili  0.5    /  DBili  x      /  AST  22     /  ALT  14     /  AlkPhos  63     12 Apr 2020 08:09           12 Lead ECG:   Ventricular Rate 62 BPM  Atrial Rate 62 BPM  P-R Interval 170 ms  QRS Duration 86 ms  Q-T Interval 408 ms  QTC Calculation(Bezet) 414 ms  P Axis 71 degrees  R Axis -49 degrees  T Axis -22 degrees  Diagnosis Line Normal sinus rhythm  Left axis deviation  T wave abnormality, consider anterolateral ischemia  Abnormal ECG  When compared with ECG of 09-APR-2020 20:40, (Unconfirmed)  Inverted T waves have replaced nonspecific T wave abnormality in Lateral leads  QT has shortened  Confirmed by Oskar Fink MD ([id number]) on 4/10/2020 1:47:57 PM (04-10-20 @ 10:39)    ECHOCARDIOGRAM  < from: TTE Echo Complete w/o contrast w/ Doppler (04.11.20 @ 14:46) >  Dimensions:    LA 4.2       Normal Values: 2.0 - 4.0 cm    Ao 3.3        Normal Values: 2.0 - 3.8 cm  SEPTUM 1.3       Normal Values: 0.6 - 1.2 cm  PWT 1.3       Normal Values: 0.6 - 1.1 cm  LVIDd 4.4         Normal Values: 3.0 - 5.6 cm  LVIDs 3.1         Normal Values: 1.8 - 4.0 cm      OBSERVATIONS:  Technically difficult study  Mitral Valve: normal, trace physiologic MR.  Aortic Valve/Aorta: Sclerotic trileaflet aortic valve with normal opening.  Tricuspid Valve: normal with trace TR.  Pulmonic Valve: Trace PI  Left Atrium: Enlarged  Right Atrium: Not well-visualized  Mobile interatrial septum  Left Ventricle: normal LV size and systolic function, estimated LVEF of 60%. Mild LVH  Right Ventricle: Grossly normal size and systolic function.  Pericardium/Pleura: normal, no significant pericardial effusion.  Pulmonary/RV Pressure: estimated PA systolic pressure of 34 mmHg     Conclusion:   Technically difficult study  Mild concentric LVH with normal internal dimensions and systolic function, estimated LVEF of 60%.   Grossly normal RV size and systolic function.   Left atrial enlargement  Sclerotic trileaflet aortic valve, without AI.   Trace physiologic MR and TR    < from: Xray Chest 1 View-PORTABLE IMMEDIATE (04.09.20 @ 22:08) >  FINDINGS:   The examination is limited by single view portable technique and patient rotation..  The lungs are  grossly clear.  No evidence of a pleural effusion or pneumothorax.   The cardiomediastinal silhouette is suboptimally assessed due to patient rotation.  No acute bony or soft tissue abnormality is recognized.    IMPRESSION:  No radiographic evidence of pneumonia.    < end of copied text >      < from: CT Chest No Cont (04.10.20 @ 01:42) >  FINDINGS:  LUNGS AND AIRWAYS: Patent central airways.  Minimal bibasilar dependent atelectasis. No pulmonary consolidation or infiltrates.  PLEURA: No pleural effusion.  MEDIASTINUM AND SHASHI: No lymphadenopathy.  VESSELS: Within normal limits.  HEART: Heart size is mildly enlarged. No pericardial effusion.  CHEST WALL AND LOWER NECK: 2 cm right thyroid lobe hypodense nodule.  VISUALIZED UPPER ABDOMEN: Small hiatal hernia. Colonic diverticuli.  BONES: Degenerative changes with minimal retrolisthesis of L1 on L2.    IMPRESSION:  Clear Lungs apart from minimal bibasilar dependent atelectasis.    < end of copied text >    < from: MR Angio Head No Cont (04.11.20 @ 12:09) > There is no flow disturbance that would imply stenosis or aneurysm on either side. The anterior, posterior and middle cerebral circulation appears roughly symmetric. Both posterior cerebral arteries are fed from the basilar.    Impression:  Normal study.    < end of copied text >  < from: MR Head No Cont (04.11.20 @ 12:06) >  There or multiple tiny foci of edema and matching diffusion restriction involving the deep and peripheral cortical white matter of the cerebellar hemispheres on either side. There is a single small focus involving the medial right thalamus. There is no associated mass effect or hemorrhage. There is mild to moderate chronic microvascular ischemic change in the periventricular white matter. Overall volume is maintained. There is no hydrocephalus. The orbital and sellar contents and cerebellar tonsils are within normal limits.    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 Gray-white differentiation is maintained.  Mild chronic microvascular ischemic changes are noted.  Ventricles, sulci, and cisterns are normal in size for the patient's age without hydrocephalus. Basal cisterns are patent.   Visualized paranasal sinuses  and mastoid air cells are clear. Calvarium is intact. Mild hyperostosis frontalis interna.    IMPRESSION:  No acute intracranial bleeding, mass effect, or shift. Mild chronic microvascular ischemic changes.

## 2020-04-14 NOTE — PROGRESS NOTE ADULT - PROBLEM SELECTOR PLAN 3
? Etiology, remains afebrile  -F/u repeat BCx x2 4/12 per ID Dr Nair, concern for possible endocarditis  -On Rocephin for r/o UTI-Doubt UTI, BCx NGTD, repeat UCx negative, continue for 3-5 days as per ID  -COVID PCR negative x2 ? Etiology, remains afebrile  -F/u repeat BCx x2 4/12 per ID Dr Nair,- concern for possible endocarditis, pt does NOT look toxic, all Blood cultures -NEG so far  -On Rocephin for r/o UTI-Doubt UTI, BCx NGTD, repeat UCx negative, continue for 3-5 days as per ID  -COVID PCR negative x2

## 2020-04-14 NOTE — PROGRESS NOTE ADULT - SUBJECTIVE AND OBJECTIVE BOX
Patient is a 84y old  Female who presents with a chief complaint of AMS (14 Apr 2020 13:27)    HPI:  85 yo female with PMHx of HTN, breast ca (s/p L lumpectomy 2018, currently on herceptin infusions q3 week, last 2 weeks ago) p/w AMS that started suddenly today. Patient is confused so daughter-in-law and son were called. Daughter-in-law states that around 7PM earlier today, when she was dropping off some food, when her mother-in-law answered the door she was noticeably disoriented and did not recognize her daughter-in-law.  Her speech was gibberish, pt was repeating same things, She complains of feeling lightheaded and dizzy, and repeatedly stated "how did you know I was here?" Last normal conversation was at 4:30PM. No recent trauma. Patient was complaining of not feeling well last Friday but attributed it to her chemotherapy. According to daughter, patient was complaining of very vague symptomatology, feeling lightheaded, dizzy with headaches, and nausea and diarrhea. Of note, last echo 3 months ago was normal, and patient has been wearing a pessary for the past few years, complaining of occasional discomfort but no acute changes recently.      ED vitals: T: 99.1 HR: 70, BP: 167/68 RR: 18 O2 Saturation: 98 on RA   Labs: k: 2.7, UA dirty  CT Head: No acute intracranial bleeding, mass effect, or shift. Mild chronic microvascular ischemic changes.   CT chest: Clear Lungs apart from minimal bibasilar dependent atelectasis.  In the ED patient received: 1000ml IVF bolus X1,Potassium 40X1 and Potassium 10 IV X3, IVF 1000 X1, Ceftriaxone X1   UA: Trace Leuk esterases, moderate bacteria,    EKG showing NSR with L axis deviation (10 Apr 2020 02:27)    INTERVAL HPI:  4/10/2020: No acute events since admission over night.  Saturating in high 90s on room air.  COVID NEGATIVE.  MR head ordered by neuro. S&S --Regular Diet. K repleted.  4/11/2020: Fever 101.3F at 1800 on 4/10, already on rocephin.  BCx NGTD.  K repleted.  Saturating well on room air. Will get repeat COVID testing in setting of fever and possible exposure. 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  Cardio Dr Tong  and ID Dr Nair consulted.  Started on atorvastatin.  Echo & CD ordered.   Allow for permissive HTN.    4/12/2020: No acute events overnight, afebrile and saturating well on room air. Repeat COVID testing NEGATIVE. K repleted.  Asa switched to plavix.  Patient started on famotidine. CTA neck with no carotid stenosis and echo showed LVEF 60%, no acute pathology.  4/13/2020: Pt seen and examined at bedside. Sat 97% on RA. D/W Cardiology- plan for CT chest with Contrast to look for Left atrial thrombus, as d/w cardiology.  4/14/2020: Pt seen and examined at bedside. Sat stable on RA. D/w radiology Dr Rodgers who recommended NOT to have CT done for LA thrombus. D/w cardio Dr Fink-started Eliquis.    OVERNIGHT EVENTS: None      OVERNIGHT EVENTS:    Home Medications:  amLODIPine 5 mg oral tablet: 1 tab(s) orally once a day (10 Apr 2020 02:56)  atenolol 100 mg oral tablet: 1 tab(s) orally once a day (10 Apr 2020 03:00)  Herceptin: every 3 weeks  (10 Apr 2020 03:00)  hydroCHLOROthiazide 25 mg oral tablet: 1 tab(s) orally once a day (10 Apr 2020 02:56)  lisinopril 40 mg oral tablet: 1 tab(s) orally once a day (10 Apr 2020 03:00)      MEDICATIONS  (STANDING):  amLODIPine   Tablet 5 milliGRAM(s) Oral daily  apixaban 5 milliGRAM(s) Oral two times a day  ATENolol  Tablet 100 milliGRAM(s) Oral daily  atorvastatin 10 milliGRAM(s) Oral at bedtime  cefTRIAXone   IVPB 1000 milliGRAM(s) IV Intermittent every 24 hours  clopidogrel Tablet 75 milliGRAM(s) Oral daily  famotidine    Tablet 20 milliGRAM(s) Oral daily  hydrochlorothiazide 25 milliGRAM(s) Oral daily  lisinopril 40 milliGRAM(s) Oral daily    MEDICATIONS  (PRN):  acetaminophen   Tablet .. 650 milliGRAM(s) Oral every 4 hours PRN Temp greater or equal to 38.5C (101.3F)      No Known Allergies      Social History:  , lives alone. ADLs independent, Walks independently, never smoker, denies alcohol use, denies recreational drug use. Flu vaccine up to date. (10 Apr 2020 02:27)      REVIEW OF SYSTEMS: I am feeling OK  CONSTITUTIONAL: No fever, No chills, No fatigue, admits to myalgia, No Body ache, No Weakness  EYES: No eye pain,  No visual disturbances, No discharge, No Redness  ENMT: No ear pain, No nose bleed, No vertigo; No sinus or throat pain, No Congestion  NECK: No pain, No stiffness  RESPIRATORY: no cough, No wheezing, No hemoptysis, no shortness of breath  CARDIOVASCULAR: No chest pain, admits to palpitations  GASTROINTESTINAL: No abdominal or epigastric pain. No nausea, No vomiting, No diarrhea or constipation; [  ] BM  GENITOURINARY: No dysuria, No frequency, No urgency, No hematuria or incontinence  NEUROLOGICAL: No headaches, No dizziness, No numbness, No tingling, No tremors, No weakness  EXT: No Swelling, No Pain, No Edema  SKIN: [ X ] No itching, burning, rashes, or lesions   MUSCULOSKELETAL: No joint pain or swelling; No muscle pain, No back pain, No extremity pain  PSYCHIATRIC: No depression, anxiety, mood swings or difficulty sleeping at night  PAIN SCALE: [X  ] None  [  ] Other-  ROS Unable to obtain due to: [  ] Dementia  [  ] Lethargy  [  ] Sedated  [  ]  non verbal [X ] AMS  REST OF REVIEW OF SYSTEMS: [ x ] Normal        Vital Signs Last 24 Hrs  T(C): 36.9 (14 Apr 2020 12:15), Max: 36.9 (14 Apr 2020 12:15)  T(F): 98.5 (14 Apr 2020 12:15), Max: 98.5 (14 Apr 2020 12:15)  HR: 66 (14 Apr 2020 12:15) (66 - 80)  BP: 136/84 (14 Apr 2020 12:15) (136/84 - 170/82)  BP(mean): --  RR: 18 (14 Apr 2020 12:15) (17 - 18)  SpO2: 96% (14 Apr 2020 12:15) (96% - 96%)      PHYSICAL EXAM:  GENERAL:  [ X ] NAD, [X  ] Well appearing, [  ] Agitated, [  ] Drowsy, [  ] Lethargy, [  ] Confused   HEAD:  [X  ] Normal, [  ] Other  EYES:  [ X ] EOMI, [X  ] PERRLA, [X  ] Conjunctiva and sclera clear normal, [  ] Other, [  ] Pallor, [  ] Discharge  ENMT:  [ X ] Normal, [ X ] Moist mucous membranes, [ X ] Good dentition, [  ] No thrush  NECK:  [ X ] Supple, [X  ] No JVD, [ x ] Normal thyroid, [  ] Lymphadenopathy, [  ] Other  CHEST/LUNG:  [X  ] Clear to auscultation bilaterally, [ x ] Breath Sounds equal B/L decreased, [X  ] No rales, [ X ] No rhonchi, [X  ] No wheezing  HEART:  [ X ] Regular rate and rhythm, [  ] Tachycardia, [  ] Bradycardia, [  ] Irregular, [X  ] No murmurs, No rubs, No gallops, [  ] PPM in place (Mfr:  )  ABDOMEN:  [ X ] Soft, [ X ] Nontender, [ X ] Nondistended, [X  ] No mass, [X  ] Bowel sounds present, [x  ] Obese  NERVOUS SYSTEM:  [ X ] Alert & Oriented x3, [X  ] Nonfocal, [  ] Confusion, [  ] Encephalopathic, [  ] Sedated, [  ] Unable to assess, [  ] Other-  EXTREMITIES:  [ X ] 2+ Peripheral Pulses, No clubbing, No cyanosis,  [  ] edema B/L lower EXT, [  ] PVD stasis skin changes B/L lower EXT  LYMPH:  No lymphadenopathy noted  SKIN:  [X  ] No rashes or lesions, [  ] Pressure ulcers, [  ] Ecchymosis, [  ] Skin tears, [  ] Other      DIET: Diet, DASH/TLC:   Sodium & Cholesterol Restricted (04-10-20 @ 12:41)      LABS:                        12.9   6.66  )-----------( 170      ( 14 Apr 2020 09:03 )             38.2     14 Apr 2020 09:03    137    |  104    |  16     ----------------------------<  109    3.7     |  27     |  0.78     Ca    9.0        14 Apr 2020 09:03  Mg     2.0       14 Apr 2020 09:03    TPro  7.8    /  Alb  3.3    /  TBili  0.4    /  DBili  x      /  AST  37     /  ALT  27     /  AlkPhos  71     14 Apr 2020 09:03    CARDIAC MARKERS ( 09 Apr 2020 21:56 )  <.015 ng/mL / x     / x     / x     / x        Culture - Blood (collected 12 Apr 2020 20:52)  Source: .Blood Blood-Venous  Preliminary Report (13 Apr 2020 21:01):    No growth to date.    Culture - Blood (collected 12 Apr 2020 20:52)  Source: .Blood Blood-Peripheral  Preliminary Report (13 Apr 2020 21:01):    No growth to date.       Thyroid Panel:  Thyroid Stimulating Hormone, Serum: 1.16 uIU/mL (04-11-20 @ 06:07)          RADIOLOGY & ADDITIONAL TESTS: No new      HEALTH ISSUES - PROBLEM Dx:  Fever: Fever  CVA (cerebral vascular accident): CVA (cerebral vascular accident)  Advanced directives, counseling/discussion: Advanced directives, counseling/discussion  Hypokalemia: Hypokalemia  Need for prophylactic measure: Need for prophylactic measure  HTN (hypertension): HTN (hypertension)  Metabolic encephalopathy: Metabolic encephalopathy  Breast cancer: Breast cancer      Consultant(s) Notes Reviewed:  [X  ] YES     Care Discussed with [ x ] Consultants, [ X ] Patient, [  ] Family, [X  ] , [ X ] Social Service, [X  ] RN, [  ] Physical Therapy  DVT PPX: [X  ] Lovenox, [  ] S C Heparin, [  ] Coumadin, [  ] Xarelto, [  ] Eliquis, [  ] Pradaxa, [  ] IV Heparin drip, [  ] SCD, [  ] Contraindication secondary to GI Bleed, [  ] Ambulation  Advanced Directive: [X  ] None, [  ] DNR/DNI Patient is a 84y old  Female who presents with a chief complaint of AMS (14 Apr 2020 13:27)    HPI:  83 yo female with PMHx of HTN, breast ca (s/p L lumpectomy 2018, currently on herceptin infusions q3 week, last 2 weeks ago) p/w AMS that started suddenly today. Patient is confused so daughter-in-law and son were called. Daughter-in-law states that around 7PM earlier today, when she was dropping off some food, when her mother-in-law answered the door she was noticeably disoriented and did not recognize her daughter-in-law.  Her speech was gibberish, pt was repeating same things, She complains of feeling lightheaded and dizzy, and repeatedly stated "how did you know I was here?" Last normal conversation was at 4:30PM. No recent trauma. Patient was complaining of not feeling well last Friday but attributed it to her chemotherapy. According to daughter, patient was complaining of very vague symptomatology, feeling lightheaded, dizzy with headaches, and nausea and diarrhea. Of note, last echo 3 months ago was normal, and patient has been wearing a pessary for the past few years, complaining of occasional discomfort but no acute changes recently.      ED vitals: T: 99.1 HR: 70, BP: 167/68 RR: 18 O2 Saturation: 98 on RA   Labs: k: 2.7, UA dirty  CT Head: No acute intracranial bleeding, mass effect, or shift. Mild chronic microvascular ischemic changes.   CT chest: Clear Lungs apart from minimal bibasilar dependent atelectasis.  In the ED patient received: 1000ml IVF bolus X1,Potassium 40X1 and Potassium 10 IV X3, IVF 1000 X1, Ceftriaxone X1   UA: Trace Leuk esterases, moderate bacteria,    EKG showing NSR with L axis deviation (10 Apr 2020 02:27)    INTERVAL HPI:  4/10/2020: No acute events since admission over night.  Saturating in high 90s on room air.  COVID NEGATIVE.  MR head ordered by neuro. S&S --Regular Diet. K repleted.  4/11/2020: Fever 101.3F at 1800 on 4/10, already on rocephin.  BCx NGTD.  K repleted.  Saturating well on room air. Will get repeat COVID testing in setting of fever and possible exposure. 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  Cardio Dr Tong  and ID Dr Nair consulted.  Started on atorvastatin.  Echo & CD ordered.   Allow for permissive HTN.    4/12/2020: No acute events overnight, afebrile and saturating well on room air. Repeat COVID testing NEGATIVE. K repleted.  Asa switched to plavix.  Patient started on famotidine. CTA neck with no carotid stenosis and echo showed LVEF 60%, no acute pathology.  4/13/2020: Pt seen and examined at bedside. Sat 97% on RA. D/W Cardiology- plan for CT chest with Contrast to look for Left atrial thrombus, as d/w cardiology.  4/14/2020: Pt seen and examined at bedside. Sat stable on RA. D/w radiology Dr Rodgers who recommended NOT to have CT done for LA thrombus. D[facility] cardio [facility] stable for d/c home today,     OVERNIGHT EVENTS: None      Home Medications:  amLODIPine 5 mg oral tablet: 1 tab(s) orally once a day (10 Apr 2020 02:56)  atenolol 100 mg oral tablet: 1 tab(s) orally once a day (10 Apr 2020 03:00)  Herceptin: every 3 weeks  (10 Apr 2020 03:00)  hydroCHLOROthiazide 25 mg oral tablet: 1 tab(s) orally once a day (10 Apr 2020 02:56)  lisinopril 40 mg oral tablet: 1 tab(s) orally once a day (10 Apr 2020 03:00)      MEDICATIONS  (STANDING):  amLODIPine   Tablet 5 milliGRAM(s) Oral daily  apixaban 5 milliGRAM(s) Oral two times a day  ATENolol  Tablet 100 milliGRAM(s) Oral daily  atorvastatin 10 milliGRAM(s) Oral at bedtime  cefTRIAXone   IVPB 1000 milliGRAM(s) IV Intermittent every 24 hours  clopidogrel Tablet 75 milliGRAM(s) Oral daily  famotidine    Tablet 20 milliGRAM(s) Oral daily  hydrochlorothiazide 25 milliGRAM(s) Oral daily  lisinopril 40 milliGRAM(s) Oral daily    MEDICATIONS  (PRN):  acetaminophen   Tablet .. 650 milliGRAM(s) Oral every 4 hours PRN Temp greater or equal to 38.5C (101.3F)      No Known Allergies      Social History:  , lives alone. ADLs independent, Walks independently, never smoker, denies alcohol use, denies recreational drug use. Flu vaccine up to date. (10 Apr 2020 02:27)      REVIEW OF SYSTEMS: I am feeling OK  CONSTITUTIONAL: No fever, No chills, No fatigue, admits to myalgia, No Body ache, No Weakness  EYES: No eye pain,  No visual disturbances, No discharge, No Redness  ENMT: No ear pain, No nose bleed, No vertigo; No sinus or throat pain, No Congestion  NECK: No pain, No stiffness  RESPIRATORY: no cough, No wheezing, No hemoptysis, no shortness of breath  CARDIOVASCULAR: No chest pain, admits to palpitations  GASTROINTESTINAL: No abdominal or epigastric pain. No nausea, No vomiting, No diarrhea or constipation; [  ] BM  GENITOURINARY: No dysuria, No frequency, No urgency, No hematuria or incontinence  NEUROLOGICAL: No headaches, No dizziness, No numbness, No tingling, No tremors, No weakness  EXT: No Swelling, No Pain, No Edema  SKIN: [ X ] No itching, burning, rashes, or lesions   MUSCULOSKELETAL: No joint pain or swelling; No muscle pain, No back pain, No extremity pain  PSYCHIATRIC: No depression, anxiety, mood swings or difficulty sleeping at night  PAIN SCALE: [X  ] None  [  ] Other-  ROS Unable to obtain due to: [  ] Dementia  [  ] Lethargy  [  ] Sedated  [  ]  non verbal [X ] AMS  REST OF REVIEW OF SYSTEMS: [ x ] Normal        Vital Signs Last 24 Hrs  T(C): 36.9 (14 Apr 2020 12:15), Max: 36.9 (14 Apr 2020 12:15)  T(F): 98.5 (14 Apr 2020 12:15), Max: 98.5 (14 Apr 2020 12:15)  HR: 66 (14 Apr 2020 12:15) (66 - 80)  BP: 136/84 (14 Apr 2020 12:15) (136/84 - 170/82)  BP(mean): --  RR: 18 (14 Apr 2020 12:15) (17 - 18)  SpO2: 96% (14 Apr 2020 12:15) (96% - 96%)      PHYSICAL EXAM:  GENERAL:  [ X ] NAD, [X  ] Well appearing, [  ] Agitated, [  ] Drowsy, [  ] Lethargy, [  ] Confused   HEAD:  [X  ] Normal, [  ] Other  EYES:  [ X ] EOMI, [X  ] PERRLA, [X  ] Conjunctiva and sclera clear normal, [  ] Other, [  ] Pallor, [  ] Discharge  ENMT:  [ X ] Normal, [ X ] Moist mucous membranes, [ X ] Good dentition, [  ] No thrush  NECK:  [ X ] Supple, [X  ] No JVD, [ x ] Normal thyroid, [  ] Lymphadenopathy, [  ] Other  CHEST/LUNG:  [X  ] Clear to auscultation bilaterally, [ x ] Breath Sounds equal B/L decreased, [X  ] No rales, [ X ] No rhonchi, [X  ] No wheezing  HEART:  [ X ] Regular rate and rhythm, [  ] Tachycardia, [  ] Bradycardia, [  ] Irregular, [X  ] No murmurs, No rubs, No gallops, [  ] PPM in place (Mfr:  )  ABDOMEN:  [ X ] Soft, [ X ] Nontender, [ X ] Nondistended, [X  ] No mass, [X  ] Bowel sounds present, [x  ] Obese  NERVOUS SYSTEM:  [ X ] Alert & Oriented x3, [X  ] Nonfocal, [  ] Confusion, [  ] Encephalopathic, [  ] Sedated, [  ] Unable to assess, [  ] Other-  EXTREMITIES:  [ X ] 2+ Peripheral Pulses, No clubbing, No cyanosis,  [  ] edema B/L lower EXT, [  ] PVD stasis skin changes B/L lower EXT  LYMPH:  No lymphadenopathy noted  SKIN:  [X  ] No rashes or lesions, [  ] Pressure ulcers, [  ] Ecchymosis, [  ] Skin tears, [  ] Other      DIET: Diet, DASH/TLC:   Sodium & Cholesterol Restricted (04-10-20 @ 12:41)      LABS:                        12.9   6.66  )-----------( 170      ( 14 Apr 2020 09:03 )             38.2     14 Apr 2020 09:03    137    |  104    |  16     ----------------------------<  109    3.7     |  27     |  0.78     Ca    9.0        14 Apr 2020 09:03  Mg     2.0       14 Apr 2020 09:03    TPro  7.8    /  Alb  3.3    /  TBili  0.4    /  DBili  x      /  AST  37     /  ALT  27     /  AlkPhos  71     14 Apr 2020 09:03    CARDIAC MARKERS ( 09 Apr 2020 21:56 )  <.015 ng/mL / x     / x     / x     / x        Culture - Blood (collected 12 Apr 2020 20:52)  Source: .Blood Blood-Venous  Preliminary Report (13 Apr 2020 21:01):    No growth to date.    Culture - Blood (collected 12 Apr 2020 20:52)  Source: .Blood Blood-Peripheral  Preliminary Report (13 Apr 2020 21:01):    No growth to date.       Thyroid Panel:  Thyroid Stimulating Hormone, Serum: 1.16 uIU/mL (04-11-20 @ 06:07)          RADIOLOGY & ADDITIONAL TESTS: No new      HEALTH ISSUES - PROBLEM Dx:  Fever: Fever  CVA (cerebral vascular accident): CVA (cerebral vascular accident)  Advanced directives, counseling/discussion: Advanced directives, counseling/discussion  Hypokalemia: Hypokalemia  Need for prophylactic measure: Need for prophylactic measure  HTN (hypertension): HTN (hypertension)  Metabolic encephalopathy: Metabolic encephalopathy  Breast cancer: Breast cancer      Consultant(s) Notes Reviewed:  [X  ] YES     Care Discussed with [ x ] Consultants, [ X ] Patient, [  ] Family, [X  ] , [ X ] Social Service, [X  ] RN, [  ] Physical Therapy  DVT PPX: [X  ] Lovenox, [  ] S C Heparin, [  ] Coumadin, [  ] Xarelto, [  ] Eliquis, [  ] Pradaxa, [  ] IV Heparin drip, [  ] SCD, [  ] Contraindication secondary to GI Bleed, [  ] Ambulation  Advanced Directive: [X  ] None, [  ] DNR/DNI

## 2020-04-14 NOTE — PROGRESS NOTE ADULT - PROBLEM SELECTOR PLAN 2
Likely due to CVA - resolved  -COVID NEGATIVE x2 Likely due to CVA - resolved  -COVID NEGATIVE x2, afebrile

## 2020-04-14 NOTE — PROGRESS NOTE ADULT - PROBLEM SELECTOR PLAN 1
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 D/w cardio Dr Fink - initiated AC with Eliquis 5 mg BID for presumed emboli prophylaxis.   -Continue Plavix 75 mg daily and Atorvastatin 10mg qhs  -Regular diet per swallow eval  -Echo showed LVEF 60%, no acute pathology  -NSR on tele monitor, DC telemetry per cardio  -HbA1C 6.4 - prediabetic range  -PT eval - no PT needs  -D/w Neuro Dr. Cooper  -Cardio, [facility], following  -Patient to f/u with cardio outpatient for loop recorder and MOSES, cardio deferring MOSES for now.

## 2020-04-14 NOTE — PROGRESS NOTE ADULT - SUBJECTIVE AND OBJECTIVE BOX
Neurology Follow up note    PREET [id number]    HPI:  83 yo female with PMHx of HTN, breast ca (s/p L lumpectomy 2018, currently on herceptin infusions q3 week, last 2 weeks ago) p/w AMS that started suddenly today. Patient is confused so daughter-in-law and son were called. Daughter-in-law states that around 7PM earlier today, when she was dropping off some food, when her mother-in-law answered the door she was noticeably disoriented and did not recognize her daughter-in-law.  Her speech was gibberish, pt was repeating same things, She complains of feeling lightheaded and dizzy, and repeatedly stated "how did you know I was here?" Last normal conversation was at 4:30PM. No recent trauma. Patient was complaining of not feeling well last Friday but attributed it to her chemotherapy. According to daughter, patient was complaining of very vague symptomatology, feeling lightheaded, dizzy with headaches, and nausea and diarrhea. Of note, last echo 3 months ago was normal, and patient has been wearing a pessary for the past few years, complaining of occasional discomfort but no acute changes recently.      ED vitals: T: 99.1 HR: 70, BP: 167/68 RR: 18 O2 Saturation: 98 on RA   Labs: k: 2.7, UA dirty  CT Head: No acute intracranial bleeding, mass effect, or shift. Mild chronic microvascular ischemic changes.   CT chest: Clear Lungs apart from minimal bibasilar dependent atelectasis.  In the ED patient received: 1000ml IVF bolus X1,Potassium 40X1 and Potassium 10 IV X3, IVF 1000 X1, Ceftriaxone X1   UA: Trace Leuk esterases, moderate bacteria,    EKG showing NSR with L axis deviation (10 Apr 2020 02:27)      Interval History - reported tinnitus    Patient is seen, chart was reviewed and case was discussed with the treatment team.  Pt is not in any distress.   Lying on bed comfortably.   ir bed comfortably.    is at bedside.      Vital Signs Last 24 Hrs  T(C): 36.9 (14 Apr 2020 12:15), Max: 36.9 (13 Apr 2020 13:45)  T(F): 98.5 (14 Apr 2020 12:15), Max: 98.5 (13 Apr 2020 13:45)  HR: 66 (14 Apr 2020 12:15) (66 - 80)  BP: 136/84 (14 Apr 2020 12:15) (115/80 - 170/82)  BP(mean): --  RR: 18 (14 Apr 2020 12:15) (17 - 18)  SpO2: 96% (14 Apr 2020 12:15) (96% - 97%)        REVIEW OF SYSTEMS:    Constitutional: No  weight loss or fatigue  Eyes: No eye pain, visual disturbances,   ENT:  No difficulty hearing, tinnitus, vertigo; No sinus or throat pain  Neck: No pain or stiffness  Respiratory: No cough, wheezing, chills or hemoptysis  Cardiovascular: No chest pain, palpitations, shortness of breath  Gastrointestinal: No abdominal or epigastric pain. No nausea, vomiting or hematemesis;  Genitourinary: No dysuria, frequency, hematuria or incontinence  Neurological: No headaches,  Psychiatric: No d mood swings or difficulty sleeping  Musculoskeletal: No joint pain   Skin: No itching, burning, rashes or lesions   Lymph Nodes: No enlarged glands  Endocrine: No heat or cold intolerance; No hair loss,   Allergy and Immunologic: No hives or eczema    On Neurological Examination:    Mental Status - Pt is alert, awake, oriented X3.  Follows commands well and able to answer questions appropriately  .Mood and affect  normal    Speech -  Normal.     Cranial Nerves - Pupils 3 mm equal and reactive to light, extraocular eye movements intact   Pt has no visual field deficit.  Pt has no facial asymmetry. Facial sensation is intact  .Tongue - is in midline.    Muscle tone - is normal all ove      Motor Exam - 5/5 all over, No drift. No shaking or tremors.    Sensory Exam - Pin prick, temperature, joint position and vibration are intact on either side. Pt withdraws all extremities equally on stimulation. No asymmetry seen. No complaints of tingling, numbness.    Gait - unsteady    Hemiataxia right    coordination:    Finger to nose: normal      Deep tendon Reflexes - 2 plus all over.       Neck Supple -  Yes.       MEDICATIONS  (STANDING):  amLODIPine   Tablet 5 milliGRAM(s) Oral daily  ATENolol  Tablet 100 milliGRAM(s) Oral daily  atorvastatin 10 milliGRAM(s) Oral at bedtime  cefTRIAXone   IVPB 1000 milliGRAM(s) IV Intermittent every 24 hours  clopidogrel Tablet 75 milliGRAM(s) Oral daily  enoxaparin Injectable 40 milliGRAM(s) SubCutaneous two times a day  famotidine    Tablet 20 milliGRAM(s) Oral daily  hydrochlorothiazide 25 milliGRAM(s) Oral daily  lisinopril 40 milliGRAM(s) Oral daily    MEDICATIONS  (PRN):  acetaminophen   Tablet .. 650 milliGRAM(s) Oral every 4 hours PRN Temp greater or equal to 38.5C (101.3F)        Allergies    No Known Allergies    Intolerances                            12.9   6.66  )-----------( 170      ( 14 Apr 2020 09:03 )             38.2                                 Hemoglobin A1C: Hemoglobin A1C, Whole Blood: 6.4 % (04-11 @ 10:52)    Lipid Panel 04-11 @ 10:29  Total Cholesterol, Serum 161    Triglycerides 74    Vitamin B12     RADIOLOGY  < from: MR Head No Cont (04.11.20 @ 12:06) >    EXAM:  MR BRAIN                            PROCEDURE DATE:  04/11/2020          INTERPRETATION:  MRI brain without contrast    History altered mental status    Comparison CT performed the prior day    There or multiple tiny foci of edema and matching diffusion restriction involving the deep and peripheral cortical white matter of the cerebellar hemispheres on either side. There is a single small focus involving the medial right thalamus. There is no associated mass effect or hemorrhage. There is mild to moderate chronic microvascular ischemic change in the periventricular white matter. Overall volume is maintained. There is no hydrocephalus. The orbital and sellar contents and cerebellar tonsils are within normal limits.    IMPRESSION:    Multiple tiny acute bland posterior distribution infarcts as above            EDGAR HOOD M.D., ATTENDING RADIOLOGIST  This document has been electronically signed. Apr 11 2020 12:18PM          < from: CT Angio Neck w/ IV Cont (04.11.20 @ 15:14) >    EXAM:  CT ANGIO NECK (W)AW IC                            PROCEDURE DATE:  04/11/2020          INTERPRETATION:  CT angiogram neck with contrast  History CVA, dizziness and altered mental status  Contrast dose: Omnipaque 100cc  Multiplanar MIPS included    Examination of the cervical circulation is negative for carotid or vertebral artery stenosis, occlusion or dissection. The left vertebral artery is anatomically dominant.    IMPRESSION:    Normal arterial findings        EDGAR HOOD M.D., ATTENDING RADIOLOGIST  This document has been electronically signed. 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  OOB to chair/ambulation with assistance only.  Pain is accessed and addressed.  Would continue to follow.

## 2020-04-14 NOTE — PROGRESS NOTE ADULT - ASSESSMENT
85 yo female with PMHx of HTN, breast ca (s/p L lumpectomy 2018, currently on herceptin infusions q3 week, last 2 weeks ago) p/w AMS, admitted for UTI vs COVID-19 vs CVA. COVID NEGATIVE.    Cerebral vascular accident  - MRI shows multiple tiny acute bland posterior distribution infarcts   - Follow neurology recs  - Permissive HTN as per neurology   - Would D/C on  plavix and eliquis  - Continue with Lipitor  - Patient spiked fever after admission, no more fevers, blood culture NGD. Will defer MOSSE will be arrranged as outpt  - Patient on remote tele. Noted to be in NSR. No occult arrhythmias contributing to stroke noted.   - ECHO from 10/2019 showed MAC, min MR, Mod dil LA, EF 70%, Mild diastolic dysfunction, mild pulm HTN. Repeat ECHO done which showed mild concentric LVH,  LVEF of 60%, LA enlargement, Trace physiologic MR and TR  - EKG showed T wave flattening initially which is now resolving. Repeat EKG showed NSR @ 62. TWI V1-V6  - Had normal stress 12/2018    Hypertension  - Continue with amlodipine, atenolol, HCTZ, lisinopril at home  - Monitor routine hemodynamics    Hypokalemia   - Resolved   - Monitor and replete lytes, keep K>4, Mg>2.  - EKG shows T wave flattening initially which is improving     DVT prophylaxis   - Lovenox 40 mg BID per primary     -From a cardiac standpoint the patient may be discharged home  - To follow up w/ cardiology in office and MOSES and/or CT will be arranged as outpt    - Monitor and replete lytes, keep K>4, Mg>2.  - All other medical needs as per primary team.  - Other cardiovascular workup will depend on clinical course.  - Will continue to follow.  Oskar Stone [facility]  Cardiology   Spectra #[phone]

## 2020-04-16 PROBLEM — C50.919 MALIGNANT NEOPLASM OF UNSPECIFIED SITE OF UNSPECIFIED FEMALE BREAST: Chronic | Status: ACTIVE | Noted: 2020-04-10

## 2020-04-17 LAB
CULTURE RESULTS: SIGNIFICANT CHANGE UP
CULTURE RESULTS: SIGNIFICANT CHANGE UP
SPECIMEN SOURCE: SIGNIFICANT CHANGE UP
SPECIMEN SOURCE: SIGNIFICANT CHANGE UP

## 2020-04-22 ENCOUNTER — APPOINTMENT (OUTPATIENT)
Dept: CARDIOLOGY | Facility: CLINIC | Age: 85
End: 2020-04-22
Payer: MEDICARE

## 2020-04-22 ENCOUNTER — NON-APPOINTMENT (OUTPATIENT)
Age: 85
End: 2020-04-22

## 2020-04-22 VITALS
BODY MASS INDEX: 24.24 KG/M2 | WEIGHT: 142 LBS | SYSTOLIC BLOOD PRESSURE: 157 MMHG | DIASTOLIC BLOOD PRESSURE: 80 MMHG | HEIGHT: 64 IN | HEART RATE: 60 BPM

## 2020-04-22 PROCEDURE — 99215 OFFICE O/P EST HI 40 MIN: CPT

## 2020-04-22 PROCEDURE — 93000 ELECTROCARDIOGRAM COMPLETE: CPT

## 2020-04-22 NOTE — PHYSICAL EXAM
[General Appearance - Well Developed] : well developed [Normal Appearance] : normal appearance [General Appearance - Well Nourished] : well nourished [Well Groomed] : well groomed [General Appearance - In No Acute Distress] : no acute distress [No Deformities] : no deformities [Eyelids - No Xanthelasma] : the eyelids demonstrated no xanthelasmas [Normal Conjunctiva] : the conjunctiva exhibited no abnormalities [Normal Oral Mucosa] : normal oral mucosa [No Oral Cyanosis] : no oral cyanosis [No Oral Pallor] : no oral pallor [Normal Jugular Venous A Waves Present] : normal jugular venous A waves present [Normal Jugular Venous V Waves Present] : normal jugular venous V waves present [No Jugular Venous Ahmadi A Waves] : no jugular venous ahmadi A waves [Respiration, Rhythm And Depth] : normal respiratory rhythm and effort [Auscultation Breath Sounds / Voice Sounds] : lungs were clear to auscultation bilaterally [Exaggerated Use Of Accessory Muscles For Inspiration] : no accessory muscle use [Abdomen Tenderness] : non-tender [Abdomen Soft] : soft [Abdomen Mass (___ Cm)] : no abdominal mass palpated [Abnormal Walk] : normal gait [Gait - Sufficient For Exercise Testing] : the gait was sufficient for exercise testing [Cyanosis, Localized] : no localized cyanosis [Nail Clubbing] : no clubbing of the fingernails [Petechial Hemorrhages (___cm)] : no petechial hemorrhages [] : no rash [Skin Color & Pigmentation] : normal skin color and pigmentation [No Venous Stasis] : no venous stasis [Skin Lesions] : no skin lesions [No Skin Ulcers] : no skin ulcer [No Xanthoma] : no  xanthoma was observed [Affect] : the affect was normal [Oriented To Time, Place, And Person] : oriented to person, place, and time [Mood] : the mood was normal [No Anxiety] : not feeling anxious [Normal S1] : normal S1 [Normal Rate] : normal [Normal S2] : normal S2 [No Murmur] : no murmurs heard [2+] : left 2+ [No Abnormalities] : the abdominal aorta was not enlarged and no bruit was heard [No Pitting Edema] : no pitting edema present [S3] : no S3 [S4] : no S4 [Right Carotid Bruit] : no bruit heard over the right carotid [Left Carotid Bruit] : no bruit heard over the left carotid [Left Femoral Bruit] : no bruit heard over the left femoral artery [Right Femoral Bruit] : no bruit heard over the right femoral artery

## 2020-04-22 NOTE — HISTORY OF PRESENT ILLNESS
[FreeTextEntry1] : Kelsey is an 84 year old female with HTN, here for follow up after hospitalization for CVA..She presented to the hospital this month with symptoms of confusion and disorientation. 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 CT angiogram and MRA of the neck were negative. TTE showed an ejection fraction of 65% with LVH. There was left atrial enlargement with calcification of aortic valve leaflets. The study was felt to be poor in quality. She was discharged on request and Plavix. Blood work in the hospital was unremarkable. Cholesterol 161, triglycerides 74, HDL 47, and . She has made a good recovery.\par \par Her past medical history notable for hypertension, and breast cancer status post radiation. She has been treated with Herceptin.\par \par She was previously seen in the office in December 2018 following an episode of syncope.  This was felt possibly do to dehydration at that time her diuretic was stopped.She also was complaining of atypical chest pain had a chemical nuclear stress test that was normal.\par \par ECG shows sinus rhythm without change.

## 2020-04-22 NOTE — REASON FOR VISIT
[Follow-Up - From Hospitalization] : follow-up of a recent hospitalization for [Syncope] : syncope [Family Member] : family member [FreeTextEntry1] : CVA

## 2020-05-11 ENCOUNTER — APPOINTMENT (OUTPATIENT)
Dept: CARDIOLOGY | Facility: CLINIC | Age: 85
End: 2020-05-11

## 2020-06-30 ENCOUNTER — NON-APPOINTMENT (OUTPATIENT)
Age: 85
End: 2020-06-30

## 2020-06-30 ENCOUNTER — APPOINTMENT (OUTPATIENT)
Dept: CARDIOLOGY | Facility: CLINIC | Age: 85
End: 2020-06-30
Payer: MEDICARE

## 2020-06-30 VITALS
DIASTOLIC BLOOD PRESSURE: 82 MMHG | HEIGHT: 64 IN | BODY MASS INDEX: 24.59 KG/M2 | WEIGHT: 144 LBS | SYSTOLIC BLOOD PRESSURE: 147 MMHG

## 2020-06-30 PROCEDURE — 93000 ELECTROCARDIOGRAM COMPLETE: CPT

## 2020-06-30 PROCEDURE — 99214 OFFICE O/P EST MOD 30 MIN: CPT

## 2020-06-30 RX ORDER — ANASTROZOLE TABLETS 1 MG/1
1 TABLET ORAL
Qty: 90 | Refills: 0 | Status: ACTIVE | COMMUNITY
Start: 2020-06-16

## 2020-06-30 NOTE — HISTORY OF PRESENT ILLNESS
[FreeTextEntry1] : Kelsey is an 85 year old female with HTN, here for follow up after hospitalization for CVA back in 4/2020. \par She was last seen in the office in 4/22/2020.\par She presented to the hospital with symptoms of confusion and disorientation. 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 CT angiogram and MRA of the neck were negative. TTE showed an ejection fraction of 65% with LVH. There was left atrial enlargement with calcification of aortic valve leaflets. The study was felt to be poor in quality. She was discharged on Eliquis and Plavix. Blood work in the hospital was unremarkable. Cholesterol 161, triglycerides 74, HDL 47, and . She has made a good recovery.\par \par Today, she is here for follow up. She reports occasional dizziness and rare palpitations. She has no chest pain or dyspnea. She reports no abnormal bleeding. She is no longer on Herceptin per her oncologist.\par \par Her past medical history notable for hypertension, and breast cancer status post radiation. She has been treated with Herceptin.\par \par She was previously seen in the office in December 2018 following an episode of syncope.  This was felt possibly do to dehydration at that time her diuretic was stopped.She also was complaining of atypical chest pain had a chemical nuclear stress test that was normal.\par \par ECG shows sinus rhythm without change.

## 2020-06-30 NOTE — PHYSICAL EXAM
[Normal Appearance] : normal appearance [General Appearance - Well Developed] : well developed [Well Groomed] : well groomed [General Appearance - Well Nourished] : well nourished [Normal Conjunctiva] : the conjunctiva exhibited no abnormalities [No Deformities] : no deformities [General Appearance - In No Acute Distress] : no acute distress [Eyelids - No Xanthelasma] : the eyelids demonstrated no xanthelasmas [Normal Oral Mucosa] : normal oral mucosa [No Oral Cyanosis] : no oral cyanosis [No Oral Pallor] : no oral pallor [Normal Jugular Venous A Waves Present] : normal jugular venous A waves present [Normal Jugular Venous V Waves Present] : normal jugular venous V waves present [No Jugular Venous Ahmadi A Waves] : no jugular venous ahmadi A waves [Exaggerated Use Of Accessory Muscles For Inspiration] : no accessory muscle use [Respiration, Rhythm And Depth] : normal respiratory rhythm and effort [Abdomen Soft] : soft [Auscultation Breath Sounds / Voice Sounds] : lungs were clear to auscultation bilaterally [Abdomen Tenderness] : non-tender [Gait - Sufficient For Exercise Testing] : the gait was sufficient for exercise testing [Abdomen Mass (___ Cm)] : no abdominal mass palpated [Abnormal Walk] : normal gait [Cyanosis, Localized] : no localized cyanosis [Nail Clubbing] : no clubbing of the fingernails [Petechial Hemorrhages (___cm)] : no petechial hemorrhages [Skin Color & Pigmentation] : normal skin color and pigmentation [] : no rash [No Skin Ulcers] : no skin ulcer [No Venous Stasis] : no venous stasis [Skin Lesions] : no skin lesions [No Xanthoma] : no  xanthoma was observed [Oriented To Time, Place, And Person] : oriented to person, place, and time [Affect] : the affect was normal [Mood] : the mood was normal [No Anxiety] : not feeling anxious [Normal Rate] : normal [Normal S1] : normal S1 [Normal S2] : normal S2 [No Murmur] : no murmurs heard [No Abnormalities] : the abdominal aorta was not enlarged and no bruit was heard [2+] : left 2+ [No Pitting Edema] : no pitting edema present [S3] : no S3 [S4] : no S4 [Right Carotid Bruit] : no bruit heard over the right carotid [Left Carotid Bruit] : no bruit heard over the left carotid [Right Femoral Bruit] : no bruit heard over the right femoral artery [Left Femoral Bruit] : no bruit heard over the left femoral artery

## 2020-06-30 NOTE — DISCUSSION/SUMMARY
[FreeTextEntry1] : The patient is doing well with good recovery from the recent CVA. Her neurological workup was negative for etiology. She had no significant cerebrovascular disease. There were multiple areas in the brain felt to possibly be due to emboli. She is on both Plavix and Eliquis to treat any possible cardiac etiology.\par \par We discussed further evaluation to rule out PFO and atrial fibrillation, with a MOSES and ILR respectively. She is hesitant to proceed with a MOSES or ILR (as they require going to a hospital), and we have decided to set her up for a 30 day event monitor in the meantime to evaluate for PAF.\par \par Her BP is satisfactory today, and she will continue her current regimen.\par We went over her medications in detail I answered her questions.\par \par I will see her again after the monitor. She and her daughter in law know to call with any issues or concerns.

## 2020-06-30 NOTE — REASON FOR VISIT
[Follow-Up - Clinic] : a clinic follow-up of [Syncope] : syncope [Family Member] : family member [FreeTextEntry1] : CVA

## 2020-08-03 ENCOUNTER — APPOINTMENT (OUTPATIENT)
Dept: INTERNAL MEDICINE | Facility: CLINIC | Age: 85
End: 2020-08-03
Payer: MEDICARE

## 2020-08-03 VITALS
OXYGEN SATURATION: 95 % | RESPIRATION RATE: 12 BRPM | HEIGHT: 62 IN | BODY MASS INDEX: 26.57 KG/M2 | DIASTOLIC BLOOD PRESSURE: 85 MMHG | SYSTOLIC BLOOD PRESSURE: 169 MMHG | WEIGHT: 144.38 LBS | HEART RATE: 60 BPM

## 2020-08-03 VITALS — SYSTOLIC BLOOD PRESSURE: 138 MMHG | DIASTOLIC BLOOD PRESSURE: 70 MMHG

## 2020-08-03 PROCEDURE — 36415 COLL VENOUS BLD VENIPUNCTURE: CPT

## 2020-08-03 PROCEDURE — 99213 OFFICE O/P EST LOW 20 MIN: CPT | Mod: 25

## 2020-08-03 NOTE — HISTORY OF PRESENT ILLNESS
[FreeTextEntry1] : ROUTINE BP CHECK  [de-identified] : HTN, H/O BREAST CA , CVA , REDIABETES , HYPERLIPIDEMIA

## 2020-08-03 NOTE — PHYSICAL EXAM
[No Lymphadenopathy] : no lymphadenopathy [Thyroid Normal, No Nodules] : the thyroid was normal and there were no nodules present [No Carotid Bruits] : no carotid bruits [Normal] : soft, non-tender, non-distended, no masses palpated, no HSM and normal bowel sounds [No Edema] : there was no peripheral edema

## 2020-08-04 LAB
ANION GAP SERPL CALC-SCNC: 12 MMOL/L
BUN SERPL-MCNC: 13 MG/DL
CALCIUM SERPL-MCNC: 9.1 MG/DL
CHLORIDE SERPL-SCNC: 101 MMOL/L
CO2 SERPL-SCNC: 26 MMOL/L
CREAT SERPL-MCNC: 0.72 MG/DL
GLUCOSE SERPL-MCNC: 158 MG/DL
POTASSIUM SERPL-SCNC: 3.2 MMOL/L
SODIUM SERPL-SCNC: 139 MMOL/L

## 2020-08-04 RX ORDER — POTASSIUM CHLORIDE 750 MG/1
10 TABLET, EXTENDED RELEASE ORAL
Refills: 0 | Status: DISCONTINUED | COMMUNITY
End: 2020-08-04

## 2020-08-04 RX ORDER — POTASSIUM CHLORIDE 750 MG/1
10 CAPSULE, EXTENDED RELEASE ORAL
Qty: 30 | Refills: 0 | Status: DISCONTINUED | COMMUNITY
Start: 2020-04-14

## 2020-08-12 ENCOUNTER — RX RENEWAL (OUTPATIENT)
Age: 85
End: 2020-08-12

## 2020-08-17 ENCOUNTER — APPOINTMENT (OUTPATIENT)
Dept: INTERNAL MEDICINE | Facility: CLINIC | Age: 85
End: 2020-08-17
Payer: MEDICARE

## 2020-08-17 VITALS
SYSTOLIC BLOOD PRESSURE: 160 MMHG | RESPIRATION RATE: 12 BRPM | WEIGHT: 144.38 LBS | BODY MASS INDEX: 26.57 KG/M2 | HEIGHT: 62 IN | OXYGEN SATURATION: 95 % | DIASTOLIC BLOOD PRESSURE: 82 MMHG | HEART RATE: 59 BPM

## 2020-08-17 VITALS — SYSTOLIC BLOOD PRESSURE: 128 MMHG | DIASTOLIC BLOOD PRESSURE: 70 MMHG

## 2020-08-17 PROCEDURE — 99213 OFFICE O/P EST LOW 20 MIN: CPT

## 2020-08-17 NOTE — PHYSICAL EXAM
[No Edema] : there was no peripheral edema [Normal] : no respiratory distress, lungs were clear to auscultation bilaterally and no accessory muscle use

## 2020-08-18 LAB
ANION GAP SERPL CALC-SCNC: 10 MMOL/L
BUN SERPL-MCNC: 16 MG/DL
CALCIUM SERPL-MCNC: 9 MG/DL
CHLORIDE SERPL-SCNC: 104 MMOL/L
CO2 SERPL-SCNC: 28 MMOL/L
CREAT SERPL-MCNC: 0.68 MG/DL
GLUCOSE SERPL-MCNC: 148 MG/DL
POTASSIUM SERPL-SCNC: 3.7 MMOL/L
SODIUM SERPL-SCNC: 142 MMOL/L

## 2020-08-24 ENCOUNTER — RX RENEWAL (OUTPATIENT)
Age: 85
End: 2020-08-24

## 2020-08-25 ENCOUNTER — RX RENEWAL (OUTPATIENT)
Age: 85
End: 2020-08-25

## 2020-09-02 ENCOUNTER — APPOINTMENT (OUTPATIENT)
Dept: CARDIOLOGY | Facility: CLINIC | Age: 85
End: 2020-09-02
Payer: MEDICARE

## 2020-09-02 VITALS — SYSTOLIC BLOOD PRESSURE: 142 MMHG | DIASTOLIC BLOOD PRESSURE: 78 MMHG

## 2020-09-02 VITALS
HEART RATE: 67 BPM | SYSTOLIC BLOOD PRESSURE: 176 MMHG | DIASTOLIC BLOOD PRESSURE: 90 MMHG | WEIGHT: 145 LBS | HEIGHT: 62 IN | BODY MASS INDEX: 26.68 KG/M2 | OXYGEN SATURATION: 94 %

## 2020-09-02 PROCEDURE — 99214 OFFICE O/P EST MOD 30 MIN: CPT

## 2020-09-02 NOTE — DISCUSSION/SUMMARY
[___ Month(s)] : [unfilled] month(s) [FreeTextEntry1] : The patient is doing well with good recovery from the recent CVA. There were multiple areas in the brain felt to possibly be due to emboli. She is on both Plavix and Eliquis to treat any possible cardiac etiology.\par \par A 2 week event monitor demonstrated self-limited runs of an irregular appearing narrow complex tachycardia, the longest lasting 24 seconds in duration, suggestive of possible paroxysmal atrial fibrillation. Because of this, she will remain on anticoagulation.\par \par Her BP is satisfactory elevated, though improved on repeat evaluation. She will continue her current regimen.\par We went over her medications in detail I answered her questions. She can try to take the statin every other day to see if it helps with her muscle cramps.\par \par I will see her again in 3 months. She and her daughter in law know to call with any issues or concerns.

## 2020-09-02 NOTE — REASON FOR VISIT
[Follow-Up - Clinic] : a clinic follow-up of [Family Member] : family member [FreeTextEntry1] : CVA [Syncope] : syncope

## 2020-09-04 ENCOUNTER — APPOINTMENT (OUTPATIENT)
Dept: INTERNAL MEDICINE | Facility: CLINIC | Age: 85
End: 2020-09-04

## 2020-09-08 ENCOUNTER — RX RENEWAL (OUTPATIENT)
Age: 85
End: 2020-09-08

## 2020-09-10 ENCOUNTER — APPOINTMENT (OUTPATIENT)
Dept: INTERNAL MEDICINE | Facility: CLINIC | Age: 85
End: 2020-09-10
Payer: MEDICARE

## 2020-09-10 ENCOUNTER — RX RENEWAL (OUTPATIENT)
Age: 85
End: 2020-09-10

## 2020-09-10 VITALS
HEART RATE: 64 BPM | DIASTOLIC BLOOD PRESSURE: 78 MMHG | HEIGHT: 62 IN | WEIGHT: 144.5 LBS | BODY MASS INDEX: 26.59 KG/M2 | OXYGEN SATURATION: 93 % | RESPIRATION RATE: 12 BRPM | SYSTOLIC BLOOD PRESSURE: 147 MMHG

## 2020-09-10 VITALS — DIASTOLIC BLOOD PRESSURE: 70 MMHG | SYSTOLIC BLOOD PRESSURE: 120 MMHG

## 2020-09-10 PROCEDURE — 99213 OFFICE O/P EST LOW 20 MIN: CPT | Mod: 25

## 2020-09-10 PROCEDURE — 36415 COLL VENOUS BLD VENIPUNCTURE: CPT

## 2020-09-10 PROCEDURE — 90653 IIV ADJUVANT VACCINE IM: CPT

## 2020-09-10 PROCEDURE — G0008: CPT

## 2020-09-10 NOTE — PHYSICAL EXAM
[No Edema] : there was no peripheral edema [No Carotid Bruits] : no carotid bruits [Normal] : soft, non-tender, non-distended, no masses palpated, no HSM and normal bowel sounds

## 2020-09-10 NOTE — HISTORY OF PRESENT ILLNESS
[FreeTextEntry1] : ROUTINE FOLLOW UP FOR BP CHECK AND BLOOD WORK  [de-identified] : PATIENT W/ H/O HTN , HYPOKALEMIA FOR ROUTINE FOLLOW UP

## 2020-09-11 LAB
ANION GAP SERPL CALC-SCNC: 14 MMOL/L
BUN SERPL-MCNC: 16 MG/DL
CALCIUM SERPL-MCNC: 9.2 MG/DL
CHLORIDE SERPL-SCNC: 102 MMOL/L
CO2 SERPL-SCNC: 25 MMOL/L
CREAT SERPL-MCNC: 0.7 MG/DL
GLUCOSE SERPL-MCNC: 149 MG/DL
POTASSIUM SERPL-SCNC: 3.6 MMOL/L
SODIUM SERPL-SCNC: 140 MMOL/L

## 2020-09-28 ENCOUNTER — RX RENEWAL (OUTPATIENT)
Age: 85
End: 2020-09-28

## 2020-10-22 ENCOUNTER — RX RENEWAL (OUTPATIENT)
Age: 85
End: 2020-10-22

## 2020-11-05 ENCOUNTER — RX RENEWAL (OUTPATIENT)
Age: 85
End: 2020-11-05

## 2020-11-12 ENCOUNTER — APPOINTMENT (OUTPATIENT)
Dept: INTERNAL MEDICINE | Facility: CLINIC | Age: 85
End: 2020-11-12
Payer: MEDICARE

## 2020-11-12 VITALS
WEIGHT: 147.31 LBS | OXYGEN SATURATION: 97 % | HEIGHT: 62 IN | DIASTOLIC BLOOD PRESSURE: 91 MMHG | HEART RATE: 63 BPM | RESPIRATION RATE: 12 BRPM | BODY MASS INDEX: 27.11 KG/M2 | SYSTOLIC BLOOD PRESSURE: 171 MMHG

## 2020-11-12 VITALS — DIASTOLIC BLOOD PRESSURE: 80 MMHG | SYSTOLIC BLOOD PRESSURE: 130 MMHG

## 2020-11-12 PROCEDURE — 99213 OFFICE O/P EST LOW 20 MIN: CPT | Mod: 25

## 2020-11-12 PROCEDURE — 36415 COLL VENOUS BLD VENIPUNCTURE: CPT

## 2020-11-13 LAB
ANION GAP SERPL CALC-SCNC: 17 MMOL/L
BUN SERPL-MCNC: 14 MG/DL
CALCIUM SERPL-MCNC: 9.1 MG/DL
CHLORIDE SERPL-SCNC: 100 MMOL/L
CO2 SERPL-SCNC: 24 MMOL/L
CREAT SERPL-MCNC: 0.69 MG/DL
GLUCOSE SERPL-MCNC: 162 MG/DL
POTASSIUM SERPL-SCNC: 3.6 MMOL/L
SODIUM SERPL-SCNC: 141 MMOL/L

## 2020-11-19 ENCOUNTER — RX RENEWAL (OUTPATIENT)
Age: 85
End: 2020-11-19

## 2020-12-04 ENCOUNTER — NON-APPOINTMENT (OUTPATIENT)
Age: 85
End: 2020-12-04

## 2020-12-04 ENCOUNTER — APPOINTMENT (OUTPATIENT)
Dept: CARDIOLOGY | Facility: CLINIC | Age: 85
End: 2020-12-04
Payer: MEDICARE

## 2020-12-04 VITALS
OXYGEN SATURATION: 95 % | HEIGHT: 62 IN | DIASTOLIC BLOOD PRESSURE: 77 MMHG | HEART RATE: 55 BPM | WEIGHT: 148 LBS | BODY MASS INDEX: 27.23 KG/M2 | SYSTOLIC BLOOD PRESSURE: 147 MMHG

## 2020-12-04 VITALS — SYSTOLIC BLOOD PRESSURE: 132 MMHG | DIASTOLIC BLOOD PRESSURE: 70 MMHG

## 2020-12-04 PROCEDURE — 93000 ELECTROCARDIOGRAM COMPLETE: CPT

## 2020-12-04 PROCEDURE — 99214 OFFICE O/P EST MOD 30 MIN: CPT

## 2020-12-04 NOTE — HISTORY OF PRESENT ILLNESS
[FreeTextEntry1] : Kelsey is an 85 year old female with HTN, here for follow up.\par She was last seen in the office in 9/2020.\par In 4/2020, she presented to the hospital with symptoms of confusion and disorientation. 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 CT angiogram and MRA of the neck were negative. TTE showed an ejection fraction of 65% with LVH. There was left atrial enlargement with calcification of aortic valve leaflets. The study was felt to be poor in quality. She was discharged on Eliquis and Plavix. Blood work in the hospital was unremarkable. Cholesterol 161, triglycerides 74, HDL 47, and . She has made a good recovery.\par \par Today, she is here for follow up. She feels well. She has no chest pain or dyspnea. She reports no abnormal bleeding.\par She reports some ringing in her ears and occasional dizziness.\par She had a 2 week event monitor that demonstrated self-limited runs of an irregular appearing narrow complex tachycardia, the longest lasting 24 seconds in duration, suggestive of possible paroxysmal atrial fibrillation.\par \par Her past medical history notable for hypertension, and breast cancer status post radiation. She had been treated with Herceptin.\par

## 2020-12-04 NOTE — DISCUSSION/SUMMARY
[___ Month(s)] : [unfilled] month(s) [FreeTextEntry1] : The patient is doing well with good recovery from the recent CVA in 4/2020. There were multiple areas in the brain felt to possibly be due to emboli. She is on both Plavix and Eliquis to treat any possible cardiac etiology.\par \par A 2 week event monitor demonstrated self-limited runs of an irregular appearing narrow complex tachycardia, the longest lasting 24 seconds in duration, suggestive of possible paroxysmal atrial fibrillation. Because of this, she will remain on anticoagulation.\par \par Her BP is improved on repeat evaluation. She will continue her current regimen.\par We went over her medications in detail I answered her questions. She will continue to take her statin every other day. She will check her BP when she has the symptoms of ringing in her ears.\par Her EKG remains abnormal, and she will have a pharm nuclear stress test in our office for further evaluation.\par \par I will see her again in 3 months.

## 2020-12-04 NOTE — PHYSICAL EXAM
[General Appearance - Well Developed] : well developed [Normal Appearance] : normal appearance [Well Groomed] : well groomed [General Appearance - Well Nourished] : well nourished [No Deformities] : no deformities [General Appearance - In No Acute Distress] : no acute distress [Normal Conjunctiva] : the conjunctiva exhibited no abnormalities [Eyelids - No Xanthelasma] : the eyelids demonstrated no xanthelasmas [Normal Oral Mucosa] : normal oral mucosa [No Oral Pallor] : no oral pallor [No Oral Cyanosis] : no oral cyanosis [Normal Jugular Venous A Waves Present] : normal jugular venous A waves present [Normal Jugular Venous V Waves Present] : normal jugular venous V waves present [No Jugular Venous Ahmadi A Waves] : no jugular venous ahmadi A waves [Respiration, Rhythm And Depth] : normal respiratory rhythm and effort [Exaggerated Use Of Accessory Muscles For Inspiration] : no accessory muscle use [Auscultation Breath Sounds / Voice Sounds] : lungs were clear to auscultation bilaterally [Abdomen Soft] : soft [Abdomen Tenderness] : non-tender [Abdomen Mass (___ Cm)] : no abdominal mass palpated [Abnormal Walk] : normal gait [Gait - Sufficient For Exercise Testing] : the gait was sufficient for exercise testing [Nail Clubbing] : no clubbing of the fingernails [Cyanosis, Localized] : no localized cyanosis [Petechial Hemorrhages (___cm)] : no petechial hemorrhages [Skin Color & Pigmentation] : normal skin color and pigmentation [] : no rash [No Venous Stasis] : no venous stasis [Skin Lesions] : no skin lesions [No Skin Ulcers] : no skin ulcer [No Xanthoma] : no  xanthoma was observed [Oriented To Time, Place, And Person] : oriented to person, place, and time [Affect] : the affect was normal [Mood] : the mood was normal [No Anxiety] : not feeling anxious [Normal Rate] : normal [Normal S1] : normal S1 [Normal S2] : normal S2 [S3] : no S3 [S4] : no S4 [No Murmur] : no murmurs heard [Right Carotid Bruit] : no bruit heard over the right carotid [Left Carotid Bruit] : no bruit heard over the left carotid [Right Femoral Bruit] : no bruit heard over the right femoral artery [Left Femoral Bruit] : no bruit heard over the left femoral artery [2+] : left 2+ [No Abnormalities] : the abdominal aorta was not enlarged and no bruit was heard [No Pitting Edema] : no pitting edema present

## 2020-12-11 ENCOUNTER — EMERGENCY (EMERGENCY)
Facility: HOSPITAL | Age: 85
LOS: 1 days | Discharge: HOME | End: 2020-12-11
Attending: EMERGENCY MEDICINE | Admitting: EMERGENCY MEDICINE
Payer: MEDICARE

## 2020-12-11 VITALS
TEMPERATURE: 98 F | RESPIRATION RATE: 18 BRPM | HEART RATE: 66 BPM | SYSTOLIC BLOOD PRESSURE: 148 MMHG | OXYGEN SATURATION: 98 % | DIASTOLIC BLOOD PRESSURE: 76 MMHG

## 2020-12-11 VITALS
WEIGHT: 149.91 LBS | OXYGEN SATURATION: 97 % | HEART RATE: 61 BPM | SYSTOLIC BLOOD PRESSURE: 172 MMHG | RESPIRATION RATE: 18 BRPM | HEIGHT: 64 IN | TEMPERATURE: 98 F | DIASTOLIC BLOOD PRESSURE: 90 MMHG

## 2020-12-11 DIAGNOSIS — Z98.890 OTHER SPECIFIED POSTPROCEDURAL STATES: Chronic | ICD-10-CM

## 2020-12-11 PROCEDURE — 70486 CT MAXILLOFACIAL W/O DYE: CPT

## 2020-12-11 PROCEDURE — 70450 CT HEAD/BRAIN W/O DYE: CPT | Mod: 26

## 2020-12-11 PROCEDURE — 72125 CT NECK SPINE W/O DYE: CPT

## 2020-12-11 PROCEDURE — 99284 EMERGENCY DEPT VISIT MOD MDM: CPT | Mod: 25

## 2020-12-11 PROCEDURE — 70450 CT HEAD/BRAIN W/O DYE: CPT

## 2020-12-11 PROCEDURE — 70486 CT MAXILLOFACIAL W/O DYE: CPT | Mod: 26

## 2020-12-11 PROCEDURE — 72125 CT NECK SPINE W/O DYE: CPT | Mod: 26

## 2020-12-11 PROCEDURE — 99285 EMERGENCY DEPT VISIT HI MDM: CPT | Mod: 25

## 2020-12-11 NOTE — ED ADULT NURSE NOTE - OBJECTIVE STATEMENT
S/P fall an hour ago. Pt tripped on the sidewalk. Pt hit the right side of her forehead. Pt take Eliquis for CVA,

## 2020-12-11 NOTE — ED PROVIDER NOTE - ATTENDING CONTRIBUTION TO CARE
I have personally performed a face to face diagnostic evaluation on this patient.  I have reviewed the PA note and agree with the history, exam, and plan of care, except as noted.  History and Exam by me shows  right head trauma p mechanical fall today.  no loc.  pt is in nad.  a n o x 3.  head- nc/right forehead and cheek abrasions.  jeanette bl.  ct head, maxillofacial and c spine are neg for fx n bleed.

## 2020-12-11 NOTE — ED PROVIDER NOTE - PATIENT PORTAL LINK FT
You can access the FollowMyHealth Patient Portal offered by [facility] by registering at the following website: [email or web address]. By joining [facility]’s FollowMyHealth portal, you will also be able to view your health information using other applications (apps) compatible with our system.

## 2020-12-11 NOTE — ED PROVIDER NOTE - CLINICAL SUMMARY MEDICAL DECISION MAKING FREE TEXT BOX
84 yo F with hx of CVA on eliquis c/o head injury sp trip and fall on sidewalk today, c/o abrasions to R forehead and cheek, utd with tdap, no loc/headache/dizziness/neck/back/hip pain, NVI, FROM X 4, +abrasions noted to R maxillary region and above R eyebrow, C/T/L spine NT, will get ct head/facial bones/cspine, pt declined pain meds, reassess

## 2020-12-11 NOTE — ED PROVIDER NOTE - MUSCULOSKELETAL, MLM
Spine appears normal, range of motion is not limited, no muscle or joint tenderness, FROM X 4, NVI, pelvis and hips B NT and stable with FROM, no C/T/L spine ttp/ecchymosis noted

## 2020-12-11 NOTE — ED ADULT NURSE NOTE - NSIMPLEMENTINTERV_GEN_ALL_ED
Implemented All Universal Safety Interventions:  [place] to call system. Call bell, personal items and telephone within reach. Instruct patient to call for assistance. Room bathroom lighting operational. Non-slip footwear when patient is off stretcher. Physically safe environment: no spills, clutter or unnecessary equipment. Stretcher in lowest position, wheels locked, appropriate side rails in place.

## 2020-12-11 NOTE — ED ADULT NURSE NOTE - CHPI ED NUR SYMPTOMS NEG
no bleeding/no loss of consciousness/no numbness/no weakness/no vomiting/no tingling/no confusion/no deformity/no fever

## 2020-12-11 NOTE — ED PROVIDER NOTE - ENMT, MLM
Airway patent, Nasal mucosa clear. Mouth with normal mucosa. +abrasions with mild ttp above R eyebrow and to R maxillary region

## 2020-12-11 NOTE — ED ADULT NURSE NOTE - CAS DISCH BELONGINGS RETURNED
Complex Repair Preamble Text (Leave Blank If You Do Not Want): Extensive wide undermining was performed. Not applicable

## 2020-12-11 NOTE — ED PROVIDER NOTE - OBJECTIVE STATEMENT
84 y/o F with hx of CVA on eliquis, HTN, breast ca co head injury sp fall x today. Pt states that she tripped on uneven sidewalk and fell hitting her R forehead and R cheek. Pt c/o mild soreness to her R forehead. Denies LOC, dizziness, headache, vision changes, numbness, tingling, weakness, cp, sob, abdominal pain, neck/back/hip pain, fever or other symptoms/injuries. States that she is UTD with tetanus.

## 2020-12-11 NOTE — ED PROVIDER NOTE - NSFOLLOWUPINSTRUCTIONS_ED_ALL_ED_FT
Follow up with your PMD in 1-2 days for re-evaluation, ongoing care and treatment. Apply bacitracin to abrasions twice daily. Take tylenol 650mg every 6 hours as needed for pain. Ice compresses for 20 minutes every 4 hours the first 24 hours. If having worsening of symptoms or other related symptoms, RETURN TO THE ER IMMEDIATELY.     Head Injury    WHAT YOU NEED TO KNOW:    A head injury can include your scalp, face, skull, or brain and range from mild to severe. Effects can appear immediately after the injury or develop later. The effects may last a short time or be permanent. Healthcare providers may want to check your recovery over time. Treatment may change as you recover or develop new health problems from the head injury.    DISCHARGE INSTRUCTIONS:    Call your local emergency number (911 in the [place]), or have someone else call if:   •You cannot be woken.      •You have a seizure.      •You stop responding to others or you faint.      •You have blurry or double vision.      •Your speech becomes slurred or confused.      •You have arm or leg weakness, loss of feeling, or new problems with coordination.      •Your pupils are larger than usual, or one pupil is a different size than the other.      •You have blood or clear fluid coming out of your ears or nose.      Return to the emergency department if:   •You have repeated or forceful vomiting.      •You feel confused.      •Your headache gets worse or becomes severe.      •You or someone caring for you notices that you are harder to wake than usual.      Call your doctor if:   •Your symptoms last longer than 6 weeks after the injury.      •You have questions or concerns about your condition or care.      Medicines:   •Acetaminophen decreases pain and fever. It is available without a doctor's order. Ask how much to take and how often to take it. Follow directions. Read the labels of all other medicines you are using to see if they also contain acetaminophen, or ask your doctor or pharmacist. Acetaminophen can cause liver damage if not taken correctly. Do not use more than 4 grams (4,000 milligrams) total of acetaminophen in one day.       •Take your medicine as directed. Contact your healthcare provider if you think your medicine is not helping or if you have side effects. Tell him or her if you are allergic to any medicine. Keep a list of the medicines, vitamins, and herbs you take. Include the amounts, and when and why you take them. Bring the list or the pill bottles to follow-up visits. Carry your medicine list with you in case of an emergency.      Self-care:   •Rest or do quiet activities. Limit your time watching TV, using the computer, or doing tasks that require a lot of thinking. Slowly return to your normal activities as directed. Do not play sports or do activities that may cause you to get hit in the head. Ask your healthcare provider when you can return to sports.      •Apply ice on your head for 15 to 20 minutes every hour or as directed. Use an ice pack, or put crushed ice in a plastic bag. Cover it with a towel before you apply it to your skin. Ice helps prevent tissue damage and decreases swelling and pain.      •Have someone stay with you for 24 hours , or as directed. This person can monitor you for problems and call for help if needed. When you are awake, the person should ask you a few questions every few hours to see if you are thinking clearly. An example is to ask your name or address.      Prevent another head injury:   •Wear a helmet that fits properly. Do this when you play sports, or ride a bike, scooter, or skateboard. Helmets help decrease your risk for a serious head injury. Talk to your healthcare provider about other ways you can protect yourself if you play sports.      •Wear your seatbelt every time you are in a car. This helps lower your risk for a head injury if you are in a car accident.      Follow up with your doctor as directed: Write down your questions so you remember to ask them during your visits.

## 2020-12-14 ENCOUNTER — APPOINTMENT (OUTPATIENT)
Dept: INTERNAL MEDICINE | Facility: CLINIC | Age: 85
End: 2020-12-14
Payer: MEDICARE

## 2020-12-14 ENCOUNTER — RX RENEWAL (OUTPATIENT)
Age: 85
End: 2020-12-14

## 2020-12-14 VITALS
HEART RATE: 68 BPM | OXYGEN SATURATION: 95 % | WEIGHT: 148 LBS | DIASTOLIC BLOOD PRESSURE: 84 MMHG | SYSTOLIC BLOOD PRESSURE: 134 MMHG | BODY MASS INDEX: 27.07 KG/M2

## 2020-12-14 VITALS — SYSTOLIC BLOOD PRESSURE: 130 MMHG | DIASTOLIC BLOOD PRESSURE: 80 MMHG

## 2020-12-14 DIAGNOSIS — S00.93XA CONTUSION OF UNSPECIFIED PART OF HEAD, INITIAL ENCOUNTER: ICD-10-CM

## 2020-12-14 DIAGNOSIS — S00.83XA CONTUSION OF OTHER PART OF HEAD, INITIAL ENCOUNTER: ICD-10-CM
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
## 2020-12-14 PROBLEM — I63.9 CEREBRAL INFARCTION, UNSPECIFIED: Chronic | Status: ACTIVE | Noted: 2020-12-11

## 2020-12-14 PROCEDURE — 99214 OFFICE O/P EST MOD 30 MIN: CPT

## 2020-12-15 ENCOUNTER — NON-APPOINTMENT (OUTPATIENT)
Age: 85
End: 2020-12-15

## 2020-12-15 ENCOUNTER — RX RENEWAL (OUTPATIENT)
Age: 85
End: 2020-12-15

## 2021-01-04 ENCOUNTER — RX RENEWAL (OUTPATIENT)
Age: 86
End: 2021-01-04

## 2021-01-13 ENCOUNTER — RX RENEWAL (OUTPATIENT)
Age: 86
End: 2021-01-13

## 2021-02-02 ENCOUNTER — RX RENEWAL (OUTPATIENT)
Age: 86
End: 2021-02-02

## 2021-02-02 ENCOUNTER — APPOINTMENT (OUTPATIENT)
Dept: INTERNAL MEDICINE | Facility: CLINIC | Age: 86
End: 2021-02-02

## 2021-02-08 ENCOUNTER — APPOINTMENT (OUTPATIENT)
Dept: INTERNAL MEDICINE | Facility: CLINIC | Age: 86
End: 2021-02-08
Payer: MEDICARE

## 2021-02-08 VITALS
BODY MASS INDEX: 27.07 KG/M2 | WEIGHT: 148 LBS | SYSTOLIC BLOOD PRESSURE: 139 MMHG | OXYGEN SATURATION: 95 % | DIASTOLIC BLOOD PRESSURE: 82 MMHG | HEART RATE: 59 BPM

## 2021-02-08 VITALS — DIASTOLIC BLOOD PRESSURE: 80 MMHG | SYSTOLIC BLOOD PRESSURE: 136 MMHG

## 2021-02-08 DIAGNOSIS — Z85.3 PERSONAL HISTORY OF MALIGNANT NEOPLASM OF BREAST: ICD-10-CM

## 2021-02-08 DIAGNOSIS — R11.0 NAUSEA: ICD-10-CM

## 2021-02-08 PROCEDURE — 36415 COLL VENOUS BLD VENIPUNCTURE: CPT

## 2021-02-08 PROCEDURE — 99214 OFFICE O/P EST MOD 30 MIN: CPT | Mod: 25

## 2021-02-08 NOTE — REVIEW OF SYSTEMS
[Negative] : Genitourinary [Abdominal Pain] : no abdominal pain [Constipation] : no constipation [Diarrhea] : diarrhea [Vomiting] : no vomiting [Heartburn] : no heartburn [Melena] : no melena

## 2021-02-08 NOTE — PHYSICAL EXAM
[No Acute Distress] : no acute distress [No Lymphadenopathy] : no lymphadenopathy [Thyroid Normal, No Nodules] : the thyroid was normal and there were no nodules present [No Carotid Bruits] : no carotid bruits [No Abdominal Bruit] : a ~M bruit was not heard ~T in the abdomen [No Edema] : there was no peripheral edema [Normal] : soft, non-tender, non-distended, no masses palpated, no HSM and normal bowel sounds [Normal Supraclavicular Nodes] : no supraclavicular lymphadenopathy [Normal Posterior Cervical Nodes] : no posterior cervical lymphadenopathy [Normal Anterior Cervical Nodes] : no anterior cervical lymphadenopathy [No Focal Deficits] : no focal deficits [de-identified] : SCLERA ANICTERIC

## 2021-02-08 NOTE — HISTORY OF PRESENT ILLNESS
[FreeTextEntry1] : ROUTINE FOLLOW UP  [de-identified] : PATIENT W/ H/O HTN , BREAST CANCER , PAF FOR ROUTINE FOLLOW UP , C/O OCCASIONAL NAUSEA, HAS NOT SEEN ENDOCRINOLOGIST YET , NO VOMITING , BOWEL CHANGES

## 2021-02-09 ENCOUNTER — NON-APPOINTMENT (OUTPATIENT)
Age: 86
End: 2021-02-09

## 2021-02-10 ENCOUNTER — APPOINTMENT (OUTPATIENT)
Dept: CARDIOLOGY | Facility: CLINIC | Age: 86
End: 2021-02-10

## 2021-02-11 LAB
ALBUMIN SERPL ELPH-MCNC: 4.1 G/DL
ALP BLD-CCNC: 82 U/L
ALT SERPL-CCNC: 12 U/L
ANION GAP SERPL CALC-SCNC: 16 MMOL/L
AST SERPL-CCNC: 19 U/L
BILIRUB SERPL-MCNC: 0.3 MG/DL
BUN SERPL-MCNC: 20 MG/DL
CALCIUM SERPL-MCNC: 9.1 MG/DL
CHLORIDE SERPL-SCNC: 99 MMOL/L
CO2 SERPL-SCNC: 23 MMOL/L
CREAT SERPL-MCNC: 0.88 MG/DL
GLUCOSE SERPL-MCNC: 189 MG/DL
POTASSIUM SERPL-SCNC: 3.3 MMOL/L
PROT SERPL-MCNC: 6.7 G/DL
SODIUM SERPL-SCNC: 138 MMOL/L

## 2021-02-16 ENCOUNTER — NON-APPOINTMENT (OUTPATIENT)
Age: 86
End: 2021-02-16

## 2021-02-17 ENCOUNTER — APPOINTMENT (OUTPATIENT)
Dept: CARDIOLOGY | Facility: CLINIC | Age: 86
End: 2021-02-17
Payer: MEDICARE

## 2021-02-17 PROCEDURE — 93015 CV STRESS TEST SUPVJ I&R: CPT

## 2021-02-17 PROCEDURE — 78452 HT MUSCLE IMAGE SPECT MULT: CPT

## 2021-02-17 PROCEDURE — A9500: CPT

## 2021-02-24 ENCOUNTER — APPOINTMENT (OUTPATIENT)
Dept: INTERNAL MEDICINE | Facility: CLINIC | Age: 86
End: 2021-02-24
Payer: MEDICARE

## 2021-02-24 VITALS — SYSTOLIC BLOOD PRESSURE: 130 MMHG | DIASTOLIC BLOOD PRESSURE: 80 MMHG

## 2021-02-24 VITALS
RESPIRATION RATE: 12 BRPM | SYSTOLIC BLOOD PRESSURE: 164 MMHG | BODY MASS INDEX: 27.23 KG/M2 | WEIGHT: 148 LBS | HEART RATE: 57 BPM | HEIGHT: 62 IN | DIASTOLIC BLOOD PRESSURE: 91 MMHG | OXYGEN SATURATION: 97 %

## 2021-02-24 PROCEDURE — 36415 COLL VENOUS BLD VENIPUNCTURE: CPT

## 2021-02-24 PROCEDURE — 99213 OFFICE O/P EST LOW 20 MIN: CPT | Mod: 25

## 2021-02-24 RX ORDER — POTASSIUM CHLORIDE 750 MG/1
10 TABLET, FILM COATED, EXTENDED RELEASE ORAL TWICE DAILY
Qty: 120 | Refills: 0 | Status: DISCONTINUED | COMMUNITY
Start: 2020-08-04 | End: 2021-02-24

## 2021-02-25 LAB
ANION GAP SERPL CALC-SCNC: 15 MMOL/L
BUN SERPL-MCNC: 19 MG/DL
CALCIUM SERPL-MCNC: 9.5 MG/DL
CHLORIDE SERPL-SCNC: 99 MMOL/L
CO2 SERPL-SCNC: 25 MMOL/L
CREAT SERPL-MCNC: 0.87 MG/DL
GLUCOSE SERPL-MCNC: 95 MG/DL
POTASSIUM SERPL-SCNC: 3.6 MMOL/L
SODIUM SERPL-SCNC: 139 MMOL/L

## 2021-03-02 ENCOUNTER — NON-APPOINTMENT (OUTPATIENT)
Age: 86
End: 2021-03-02

## 2021-03-02 ENCOUNTER — APPOINTMENT (OUTPATIENT)
Dept: CARDIOLOGY | Facility: CLINIC | Age: 86
End: 2021-03-02
Payer: MEDICARE

## 2021-03-02 VITALS
BODY MASS INDEX: 27.42 KG/M2 | SYSTOLIC BLOOD PRESSURE: 158 MMHG | HEART RATE: 55 BPM | OXYGEN SATURATION: 98 % | DIASTOLIC BLOOD PRESSURE: 80 MMHG | HEIGHT: 62 IN | WEIGHT: 149 LBS

## 2021-03-02 DIAGNOSIS — R94.31 ABNORMAL ELECTROCARDIOGRAM [ECG] [EKG]: ICD-10-CM

## 2021-03-02 PROCEDURE — 99214 OFFICE O/P EST MOD 30 MIN: CPT

## 2021-03-02 PROCEDURE — 93000 ELECTROCARDIOGRAM COMPLETE: CPT

## 2021-03-02 NOTE — HISTORY OF PRESENT ILLNESS
[FreeTextEntry1] : Kelsey is an 85 year old female with HTN, here for follow up.\par She was last seen in the office in 12/2020.\par In 4/2020, she presented to the hospital with symptoms of confusion and disorientation. 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 CT angiogram and MRA of the neck were negative. TTE showed an ejection fraction of 65% with LVH. There was left atrial enlargement with calcification of aortic valve leaflets. The study was felt to be poor in quality. She was discharged on Eliquis and Plavix. Blood work in the hospital was unremarkable. Cholesterol 161, triglycerides 74, HDL 47, and . She has made a good recovery.\par \par Today, she is here for follow up. She feels well. She has no chest pain or dyspnea. She reports no abnormal bleeding.\par She had a 2 week event monitor that demonstrated self-limited runs of an irregular appearing narrow complex tachycardia, the longest lasting 24 seconds in duration, suggestive of possible paroxysmal atrial fibrillation.\par Her BP has been running higher as of late. She has also had issues with hypokalemia.\par A pharmacologic stress test in 2021 was unremarkable. \par \par Her past medical history notable for hypertension, and breast cancer status post radiation. She had been treated with Herceptin.\par

## 2021-03-02 NOTE — PHYSICAL EXAM
[General Appearance - Well Developed] : well developed [Normal Appearance] : normal appearance [Well Groomed] : well groomed [General Appearance - Well Nourished] : well nourished [No Deformities] : no deformities [General Appearance - In No Acute Distress] : no acute distress [Normal Conjunctiva] : the conjunctiva exhibited no abnormalities [Eyelids - No Xanthelasma] : the eyelids demonstrated no xanthelasmas [Normal Oral Mucosa] : normal oral mucosa [No Oral Pallor] : no oral pallor [No Oral Cyanosis] : no oral cyanosis [Normal Jugular Venous A Waves Present] : normal jugular venous A waves present [Normal Jugular Venous V Waves Present] : normal jugular venous V waves present [No Jugular Venous Ahmadi A Waves] : no jugular venous ahmadi A waves [Respiration, Rhythm And Depth] : normal respiratory rhythm and effort [Exaggerated Use Of Accessory Muscles For Inspiration] : no accessory muscle use [Auscultation Breath Sounds / Voice Sounds] : lungs were clear to auscultation bilaterally [Abdomen Soft] : soft [Abdomen Tenderness] : non-tender [Abdomen Mass (___ Cm)] : no abdominal mass palpated [Abnormal Walk] : normal gait [Gait - Sufficient For Exercise Testing] : the gait was sufficient for exercise testing [Nail Clubbing] : no clubbing of the fingernails [Cyanosis, Localized] : no localized cyanosis [Petechial Hemorrhages (___cm)] : no petechial hemorrhages [Skin Color & Pigmentation] : normal skin color and pigmentation [] : no rash [No Venous Stasis] : no venous stasis [Skin Lesions] : no skin lesions [No Skin Ulcers] : no skin ulcer [No Xanthoma] : no  xanthoma was observed [Oriented To Time, Place, And Person] : oriented to person, place, and time [Affect] : the affect was normal [Mood] : the mood was normal [No Anxiety] : not feeling anxious [Normal Rate] : normal [Normal S1] : normal S1 [Normal S2] : normal S2 [No Murmur] : no murmurs heard [2+] : left 2+ [No Abnormalities] : the abdominal aorta was not enlarged and no bruit was heard [No Pitting Edema] : no pitting edema present [S3] : no S3 [S4] : no S4 [Right Carotid Bruit] : no bruit heard over the right carotid [Left Carotid Bruit] : no bruit heard over the left carotid [Right Femoral Bruit] : no bruit heard over the right femoral artery [Left Femoral Bruit] : no bruit heard over the left femoral artery

## 2021-03-02 NOTE — DISCUSSION/SUMMARY
[___ Month(s)] : [unfilled] month(s) [FreeTextEntry1] : The patient is doing well with good recovery from the recent CVA in 4/2020. There were multiple areas in the brain felt to possibly be due to emboli. \par A 2 week event monitor demonstrated self-limited runs of an irregular appearing narrow complex tachycardia, the longest lasting 24 seconds in duration, suggestive of possible paroxysmal atrial fibrillation. Because of this, she will remain on anticoagulation. Her recent pharm stress test was unremarkable. \par \par Her BP is elevated. We have reviewed her medication regimen in detail. She is taking atenolol, HCTZ and amlodipine. It appears lisinopril has fallen off her list. She also has had issues with hypokalemia. If she is not taking lisinopril, she will go back on it, and we will repeat her potassium in 2 weeks. We may need to stop her diuretic.\par \par I will see her again in 3 months.

## 2021-04-05 ENCOUNTER — APPOINTMENT (OUTPATIENT)
Dept: INTERNAL MEDICINE | Facility: CLINIC | Age: 86
End: 2021-04-05
Payer: MEDICARE

## 2021-04-05 ENCOUNTER — NON-APPOINTMENT (OUTPATIENT)
Age: 86
End: 2021-04-05

## 2021-04-05 ENCOUNTER — LABORATORY RESULT (OUTPATIENT)
Age: 86
End: 2021-04-05

## 2021-04-05 VITALS
DIASTOLIC BLOOD PRESSURE: 74 MMHG | HEART RATE: 60 BPM | BODY MASS INDEX: 26.52 KG/M2 | OXYGEN SATURATION: 95 % | WEIGHT: 145 LBS | SYSTOLIC BLOOD PRESSURE: 137 MMHG | TEMPERATURE: 98.1 F

## 2021-04-05 VITALS — DIASTOLIC BLOOD PRESSURE: 70 MMHG | SYSTOLIC BLOOD PRESSURE: 128 MMHG

## 2021-04-05 DIAGNOSIS — R53.83 OTHER FATIGUE: ICD-10-CM

## 2021-04-05 PROCEDURE — 36415 COLL VENOUS BLD VENIPUNCTURE: CPT

## 2021-04-05 PROCEDURE — 99214 OFFICE O/P EST MOD 30 MIN: CPT | Mod: 25

## 2021-04-05 RX ORDER — FAMOTIDINE 20 MG/1
20 TABLET, FILM COATED ORAL
Refills: 0 | Status: DISCONTINUED | COMMUNITY
End: 2021-04-05

## 2021-04-05 RX ORDER — CLOPIDOGREL 75 MG/1
75 TABLET, FILM COATED ORAL
Refills: 0 | Status: DISCONTINUED | COMMUNITY
End: 2021-04-05

## 2021-04-05 NOTE — PHYSICAL EXAM
[No Acute Distress] : no acute distress [Well Nourished] : well nourished [Well Developed] : well developed [Well-Appearing] : well-appearing [No JVD] : no jugular venous distention [No Respiratory Distress] : no respiratory distress  [No Accessory Muscle Use] : no accessory muscle use [Clear to Auscultation] : lungs were clear to auscultation bilaterally [Normal Rate] : normal rate  [Regular Rhythm] : with a regular rhythm [Normal S1, S2] : normal S1 and S2 [No Murmur] : no murmur heard [No Carotid Bruits] : no carotid bruits [No Edema] : there was no peripheral edema [Normal Gait] : normal gait [Alert and Oriented x3] : oriented to person, place, and time

## 2021-04-05 NOTE — HISTORY OF PRESENT ILLNESS
[FreeTextEntry1] : f/u HTN, hypokalemia  [de-identified] : Kelsey returns for follow up.\par \par In interim, she followed with Dr. Tong. She was resumed on lisinopril 40mg. She also saw Dr. Marquez (Endocrine) and reports normal FNA. Labs showed normal TSH, mild hypoK, and A1c of 6.6%. She has follow up with Dr. Marquez coming up. She does still have fatigue. Reports she is full of energy some day and has no energy the others. No other new symptoms.

## 2021-04-05 NOTE — ASSESSMENT
[FreeTextEntry1] : 1. HTN\par At goal on recheck\par Continue current regimen\par \par 2. Hypokalemia\par Check BMP\par \par 3. Fatigue\par Check CBC, B12, Vit D\par \par

## 2021-04-05 NOTE — REVIEW OF SYSTEMS
[Fever] : no fever [Chills] : no chills [Fatigue] : fatigue [Chest Pain] : no chest pain [Palpitations] : no palpitations [Shortness Of Breath] : no shortness of breath [Abdominal Pain] : no abdominal pain [Headache] : no headache [Dizziness] : no dizziness

## 2021-04-09 LAB
25(OH)D3 SERPL-MCNC: 30 NG/ML
ANION GAP SERPL CALC-SCNC: 13 MMOL/L
BASOPHILS # BLD AUTO: 0.02 K/UL
BASOPHILS NFR BLD AUTO: 0.3 %
BUN SERPL-MCNC: 18 MG/DL
CALCIUM SERPL-MCNC: 9.3 MG/DL
CHLORIDE SERPL-SCNC: 101 MMOL/L
CO2 SERPL-SCNC: 24 MMOL/L
CREAT SERPL-MCNC: 0.73 MG/DL
EOSINOPHIL # BLD AUTO: 0.03 K/UL
EOSINOPHIL NFR BLD AUTO: 0.4 %
GLUCOSE SERPL-MCNC: 145 MG/DL
HCT VFR BLD CALC: 36.2 %
HGB BLD-MCNC: 12.2 G/DL
IMM GRANULOCYTES NFR BLD AUTO: 0.8 %
LYMPHOCYTES # BLD AUTO: 1.4 K/UL
LYMPHOCYTES NFR BLD AUTO: 18.8 %
MAN DIFF?: NORMAL
MCHC RBC-ENTMCNC: 29.7 PG
MCHC RBC-ENTMCNC: 33.7 GM/DL
MCV RBC AUTO: 88.1 FL
MONOCYTES # BLD AUTO: 2.32 K/UL
MONOCYTES NFR BLD AUTO: 31.2 %
NEUTROPHILS # BLD AUTO: 3.6 K/UL
NEUTROPHILS NFR BLD AUTO: 48.5 %
PLATELET # BLD AUTO: 123 K/UL
POTASSIUM SERPL-SCNC: 3.8 MMOL/L
RBC # BLD: 4.11 M/UL
RBC # FLD: 13.4 %
SODIUM SERPL-SCNC: 137 MMOL/L
VIT B12 SERPL-MCNC: 571 PG/ML
WBC # FLD AUTO: 7.43 K/UL

## 2021-04-22 ENCOUNTER — APPOINTMENT (OUTPATIENT)
Dept: INTERNAL MEDICINE | Facility: CLINIC | Age: 86
End: 2021-04-22
Payer: MEDICARE

## 2021-04-22 ENCOUNTER — LABORATORY RESULT (OUTPATIENT)
Age: 86
End: 2021-04-22

## 2021-04-22 VITALS
WEIGHT: 145 LBS | HEIGHT: 62 IN | HEART RATE: 59 BPM | BODY MASS INDEX: 26.68 KG/M2 | OXYGEN SATURATION: 96 % | DIASTOLIC BLOOD PRESSURE: 100 MMHG | SYSTOLIC BLOOD PRESSURE: 181 MMHG | RESPIRATION RATE: 12 BRPM

## 2021-04-22 VITALS — SYSTOLIC BLOOD PRESSURE: 142 MMHG | DIASTOLIC BLOOD PRESSURE: 84 MMHG

## 2021-04-22 PROCEDURE — 99213 OFFICE O/P EST LOW 20 MIN: CPT | Mod: 25

## 2021-04-22 PROCEDURE — 36415 COLL VENOUS BLD VENIPUNCTURE: CPT

## 2021-04-22 NOTE — REVIEW OF SYSTEMS
[Fever] : no fever [Chills] : no chills [Chest Pain] : no chest pain [Palpitations] : no palpitations [Shortness Of Breath] : no shortness of breath [Easy Bleeding] : no easy bleeding [Easy Bruising] : no easy bruising

## 2021-04-22 NOTE — HISTORY OF PRESENT ILLNESS
[FreeTextEntry1] : f/u abnormal CBC [de-identified] : Kelsey presents for follow up.\par \par BP is mildly elevated today, though was better at last visit. Abnormal CBC explained to patient. No change in medications. No easy bruising or bleeding.

## 2021-04-22 NOTE — ASSESSMENT
[FreeTextEntry1] : 1. HTN\par Above goal today though prev well controlled\par Very sensitive to medication side effects and has had dizziness and syncope in past with very tight BP control\par Continue current regimen\par Return 1 month for BP check and will self monitor and record at home\par \par 2. Abnormal CBC\par Recheck CBC

## 2021-04-27 LAB
BASOPHILS # BLD AUTO: 0 K/UL
BASOPHILS NFR BLD AUTO: 0 %
EOSINOPHIL # BLD AUTO: 0.24 K/UL
EOSINOPHIL NFR BLD AUTO: 2.6 %
HCT VFR BLD CALC: 37.5 %
HGB BLD-MCNC: 12.5 G/DL
LYMPHOCYTES # BLD AUTO: 1.88 K/UL
LYMPHOCYTES NFR BLD AUTO: 20 %
MAN DIFF?: NORMAL
MCHC RBC-ENTMCNC: 29.8 PG
MCHC RBC-ENTMCNC: 33.3 GM/DL
MCV RBC AUTO: 89.3 FL
MONOCYTES # BLD AUTO: 3.52 K/UL
MONOCYTES NFR BLD AUTO: 37.4 %
NEUTROPHILS # BLD AUTO: 3.68 K/UL
NEUTROPHILS NFR BLD AUTO: 39.1 %
PLATELET # BLD AUTO: 170 K/UL
RBC # BLD: 4.2 M/UL
RBC # FLD: 13.9 %
WBC # FLD AUTO: 9.41 K/UL

## 2021-05-04 ENCOUNTER — RX RENEWAL (OUTPATIENT)
Age: 86
End: 2021-05-04

## 2021-05-14 ENCOUNTER — APPOINTMENT (OUTPATIENT)
Dept: INTERNAL MEDICINE | Facility: CLINIC | Age: 86
End: 2021-05-14
Payer: MEDICARE

## 2021-05-14 VITALS
TEMPERATURE: 98 F | DIASTOLIC BLOOD PRESSURE: 84 MMHG | SYSTOLIC BLOOD PRESSURE: 146 MMHG | BODY MASS INDEX: 26.34 KG/M2 | WEIGHT: 144 LBS | HEART RATE: 69 BPM | OXYGEN SATURATION: 95 %

## 2021-05-14 VITALS — SYSTOLIC BLOOD PRESSURE: 144 MMHG | DIASTOLIC BLOOD PRESSURE: 80 MMHG

## 2021-05-14 PROCEDURE — 99213 OFFICE O/P EST LOW 20 MIN: CPT

## 2021-05-14 NOTE — HISTORY OF PRESENT ILLNESS
[FreeTextEntry1] : HTN [de-identified] : Kelsey returns for follow up of HTN.\par \par She bring with her a home BP log. BPs are in high 130s to 150s/80s. She has been feeling well overall. Does not report dizziness or lightheadedness.

## 2021-05-14 NOTE — PHYSICAL EXAM
[No Acute Distress] : no acute distress [Well Nourished] : well nourished [Well Developed] : well developed [Well-Appearing] : well-appearing [Normal Sclera/Conjunctiva] : normal sclera/conjunctiva [No Respiratory Distress] : no respiratory distress  [No Accessory Muscle Use] : no accessory muscle use [Clear to Auscultation] : lungs were clear to auscultation bilaterally [Normal Rate] : normal rate  [Regular Rhythm] : with a regular rhythm [Normal S1, S2] : normal S1 and S2 [No Edema] : there was no peripheral edema

## 2021-05-14 NOTE — REVIEW OF SYSTEMS
[Fever] : no fever [Chills] : no chills [Fatigue] : no fatigue [Night Sweats] : no night sweats [Chest Pain] : no chest pain [Palpitations] : no palpitations [Lower Ext Edema] : no lower extremity edema [Shortness Of Breath] : no shortness of breath [Dizziness] : no dizziness

## 2021-05-14 NOTE — ASSESSMENT
[FreeTextEntry1] : 1. HTN\par Still mildly above goal and has hx of CVA\par Will increase amlodipine to 7.5mg qd \par Will monitor home BPs and let us know if any side effects \par Seeing Cards in [place]

## 2021-05-25 NOTE — HISTORY OF PRESENT ILLNESS
[FreeTextEntry1] : Kelsey is an 85 year old female with HTN, here for follow up.\par She was last seen in the office in 6/2020.\par In 4/2020, she presented to the hospital with symptoms of confusion and disorientation. 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 CT angiogram and MRA of the neck were negative. TTE showed an ejection fraction of 65% with LVH. There was left atrial enlargement with calcification of aortic valve leaflets. The study was felt to be poor in quality. She was discharged on Eliquis and Plavix. Blood work in the hospital was unremarkable. Cholesterol 161, triglycerides 74, HDL 47, and . She has made a good recovery.\par \par Today, she is here for follow up. She feels well. She has no chest pain or dyspnea. She reports no abnormal bleeding. She reports some muscle pains at point.\par She had a 2 week event monitor that demonstrated self-limited runs of an irregular appearing narrow complex tachycardia, the longest lasting 24 seconds in duration, suggestive of possible paroxysmal atrial fibrillation.\par \par Her past medical history notable for hypertension, and breast cancer status post radiation. She had been treated with Herceptin.\par  Estimated Blood Loss (Cc): minimal

## 2021-06-01 ENCOUNTER — RX RENEWAL (OUTPATIENT)
Age: 86
End: 2021-06-01

## 2021-06-07 ENCOUNTER — RX RENEWAL (OUTPATIENT)
Age: 86
End: 2021-06-07

## 2021-06-10 ENCOUNTER — NON-APPOINTMENT (OUTPATIENT)
Age: 86
End: 2021-06-10

## 2021-06-14 ENCOUNTER — NON-APPOINTMENT (OUTPATIENT)
Age: 86
End: 2021-06-14

## 2021-06-14 ENCOUNTER — APPOINTMENT (OUTPATIENT)
Dept: CARDIOLOGY | Facility: CLINIC | Age: 86
End: 2021-06-14
Payer: MEDICARE

## 2021-06-14 VITALS — DIASTOLIC BLOOD PRESSURE: 75 MMHG | SYSTOLIC BLOOD PRESSURE: 126 MMHG

## 2021-06-14 VITALS
HEIGHT: 62 IN | WEIGHT: 146 LBS | OXYGEN SATURATION: 97 % | HEART RATE: 57 BPM | SYSTOLIC BLOOD PRESSURE: 145 MMHG | BODY MASS INDEX: 26.87 KG/M2 | DIASTOLIC BLOOD PRESSURE: 80 MMHG

## 2021-06-14 PROCEDURE — 99214 OFFICE O/P EST MOD 30 MIN: CPT

## 2021-06-14 PROCEDURE — 93000 ELECTROCARDIOGRAM COMPLETE: CPT

## 2021-06-14 RX ORDER — HYDROCHLOROTHIAZIDE 25 MG/1
25 TABLET ORAL
Qty: 30 | Refills: 2 | Status: DISCONTINUED | COMMUNITY
Start: 2021-05-04 | End: 2021-06-14

## 2021-06-14 NOTE — HISTORY OF PRESENT ILLNESS
[FreeTextEntry1] : Kelsey is an 86 year old female with HTN, here for follow up.\par \par She was last seen in the office in 3/21.\par \par In 4/2020, she presented to the hospital with symptoms of confusion and disorientation. 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 CT angiogram and MRA of the neck were negative. TTE showed an ejection fraction of 65% with LVH. There was left atrial enlargement with calcification of aortic valve leaflets. The study was felt to be poor in quality. She was discharged on Eliquis and Plavix. Blood work in the hospital was unremarkable. Cholesterol 161, triglycerides 74, HDL 47, and . She has made a good recovery.\par \par Today, she is here for follow up. She feels well. She has no chest pain or dyspnea. She reports no abnormal bleeding.\par She had a 2 week event monitor that demonstrated self-limited runs of an irregular appearing narrow complex tachycardia, the longest lasting 24 seconds in duration, suggestive of possible paroxysmal atrial fibrillation.\par Her BP has been running better as of late. She has also had issues with hypokalemia.\par A pharmacologic stress test in 2021 was unremarkable. \par \par Her past medical history notable for hypertension, and breast cancer status post radiation. She had been treated with Herceptin.\par

## 2021-06-14 NOTE — DISCUSSION/SUMMARY
[___ Month(s)] : in [unfilled] month(s) [FreeTextEntry1] : Kelsey is doing well with good recovery from a recent CVA in 4/2020. There were multiple areas in the brain felt to possibly be due to emboli.  A 2 week event monitor did demonstrate self-limited runs of an irregular appearing narrow complex tachycardia, the longest lasting 24 seconds in duration, suggestive of possible paroxysmal atrial fibrillation. Because of this, she will remain on anticoagulation. Her recent pharm stress test was unremarkable. \par \par Her BP is better overall. She will remain on atenolol, amlodipine and lisinopril.  She has had issues with hypokalemia, and has been unable to tolerate significant potassium supplementation.  Given this, I have stopped her hydrochlorothiazide, and started her on spironolactone.  She is going to have a repeat potassium next week, and will hopefully be able to remain off of potassium supplementation.\par \par I will see her again in 3 months.

## 2021-06-21 ENCOUNTER — APPOINTMENT (OUTPATIENT)
Dept: INTERNAL MEDICINE | Facility: CLINIC | Age: 86
End: 2021-06-21
Payer: MEDICARE

## 2021-06-21 VITALS
OXYGEN SATURATION: 95 % | DIASTOLIC BLOOD PRESSURE: 80 MMHG | HEIGHT: 62 IN | RESPIRATION RATE: 12 BRPM | WEIGHT: 146 LBS | SYSTOLIC BLOOD PRESSURE: 135 MMHG | BODY MASS INDEX: 26.87 KG/M2 | HEART RATE: 61 BPM

## 2021-06-21 VITALS — DIASTOLIC BLOOD PRESSURE: 70 MMHG | SYSTOLIC BLOOD PRESSURE: 128 MMHG

## 2021-06-21 PROCEDURE — 36415 COLL VENOUS BLD VENIPUNCTURE: CPT

## 2021-06-21 PROCEDURE — 99213 OFFICE O/P EST LOW 20 MIN: CPT | Mod: 25

## 2021-06-21 RX ORDER — POTASSIUM CHLORIDE 750 MG/1
10 TABLET, FILM COATED, EXTENDED RELEASE ORAL 3 TIMES DAILY
Qty: 90 | Refills: 2 | Status: DISCONTINUED | COMMUNITY
Start: 2020-06-15 | End: 2021-06-21

## 2021-06-21 NOTE — HISTORY OF PRESENT ILLNESS
[FreeTextEntry1] : f/u HTN, hypoK [de-identified] : Kelsey returns for follow up.\par \par She has been feeling well overall. She saw Cardiology and HCTZ and K stopped and switched to spirolactone. No side effect noted.

## 2021-06-21 NOTE — REVIEW OF SYSTEMS
[Fever] : no fever [Chills] : no chills [Fatigue] : no fatigue [Night Sweats] : no night sweats [Sore Throat] : no sore throat [Chest Pain] : no chest pain [Palpitations] : no palpitations [Shortness Of Breath] : no shortness of breath [Wheezing] : no wheezing [Cough] : no cough [Headache] : no headache [Dizziness] : no dizziness

## 2021-06-22 LAB
ANION GAP SERPL CALC-SCNC: 15 MMOL/L
BUN SERPL-MCNC: 19 MG/DL
CALCIUM SERPL-MCNC: 9.9 MG/DL
CHLORIDE SERPL-SCNC: 101 MMOL/L
CO2 SERPL-SCNC: 20 MMOL/L
CREAT SERPL-MCNC: 0.86 MG/DL
GLUCOSE SERPL-MCNC: 124 MG/DL
POTASSIUM SERPL-SCNC: 4.8 MMOL/L
SODIUM SERPL-SCNC: 136 MMOL/L

## 2021-06-28 ENCOUNTER — NON-APPOINTMENT (OUTPATIENT)
Age: 86
End: 2021-06-28

## 2021-06-28 RX ORDER — ASPIRIN ENTERIC COATED TABLETS 81 MG 81 MG/1
81 TABLET, DELAYED RELEASE ORAL DAILY
Qty: 90 | Refills: 0 | Status: ACTIVE | COMMUNITY
Start: 2021-06-28 | End: 1900-01-01

## 2021-06-28 RX ORDER — CLOPIDOGREL BISULFATE 75 MG/1
75 TABLET, FILM COATED ORAL
Qty: 90 | Refills: 0 | Status: DISCONTINUED | COMMUNITY
Start: 2020-08-12 | End: 2021-06-28

## 2021-07-11 ENCOUNTER — RX RENEWAL (OUTPATIENT)
Age: 86
End: 2021-07-11

## 2021-07-22 ENCOUNTER — APPOINTMENT (OUTPATIENT)
Dept: INTERNAL MEDICINE | Facility: CLINIC | Age: 86
End: 2021-07-22
Payer: MEDICARE

## 2021-07-22 VITALS
WEIGHT: 140 LBS | OXYGEN SATURATION: 96 % | SYSTOLIC BLOOD PRESSURE: 116 MMHG | HEART RATE: 73 BPM | TEMPERATURE: 98.6 F | DIASTOLIC BLOOD PRESSURE: 79 MMHG | BODY MASS INDEX: 25.61 KG/M2

## 2021-07-22 PROCEDURE — 99213 OFFICE O/P EST LOW 20 MIN: CPT

## 2021-07-23 VITALS — SYSTOLIC BLOOD PRESSURE: 116 MMHG | DIASTOLIC BLOOD PRESSURE: 80 MMHG

## 2021-07-23 NOTE — HISTORY OF PRESENT ILLNESS
[FreeTextEntry1] : dizziness [de-identified] : Kelsey returns for follow up.\par \par She has felt dizzy and off since starting spironolactone. She is unsure exactly what is associated with her symptoms though she occasionally feels nauseous. She has had no vomiting, headaches, chest pain, palpitations, or SOB. She has had no other new medications.

## 2021-07-23 NOTE — REVIEW OF SYSTEMS
[Fever] : no fever [Chills] : no chills [Night Sweats] : no night sweats [Palpitations] : no palpitations [Chest Pain] : no chest pain [Shortness Of Breath] : no shortness of breath [Abdominal Pain] : no abdominal pain [Nausea] : nausea [Vomiting] : no vomiting [Headache] : no headache [Dizziness] : dizziness

## 2021-07-23 NOTE — PHYSICAL EXAM
[No Acute Distress] : no acute distress [Well Nourished] : well nourished [Well Developed] : well developed [Well-Appearing] : well-appearing [Normal Sclera/Conjunctiva] : normal sclera/conjunctiva [No Respiratory Distress] : no respiratory distress  [No Accessory Muscle Use] : no accessory muscle use [Clear to Auscultation] : lungs were clear to auscultation bilaterally [Normal Rate] : normal rate  [Regular Rhythm] : with a regular rhythm [Normal S1, S2] : normal S1 and S2 [No Murmur] : no murmur heard [No Carotid Bruits] : no carotid bruits [No Edema] : there was no peripheral edema [No Focal Deficits] : no focal deficits [Normal Gait] : normal gait [Alert and Oriented x3] : oriented to person, place, and time

## 2021-07-23 NOTE — ASSESSMENT
[FreeTextEntry1] : 1. Dizziness \par BP may be too well controlled\par Was on amlodipine 7.5mg qd and so will reduce back down to 5mg\par Will keep spironolactone for now given issues with hypokalemia previously\par Encouraged to increase PO hydration\par Return 2 weeks for re-assessment\par

## 2021-08-03 ENCOUNTER — APPOINTMENT (OUTPATIENT)
Dept: INTERNAL MEDICINE | Facility: CLINIC | Age: 86
End: 2021-08-03
Payer: MEDICARE

## 2021-08-03 VITALS
OXYGEN SATURATION: 96 % | SYSTOLIC BLOOD PRESSURE: 137 MMHG | WEIGHT: 140 LBS | TEMPERATURE: 97.8 F | HEART RATE: 62 BPM | BODY MASS INDEX: 25.61 KG/M2 | DIASTOLIC BLOOD PRESSURE: 83 MMHG

## 2021-08-03 VITALS — DIASTOLIC BLOOD PRESSURE: 70 MMHG | SYSTOLIC BLOOD PRESSURE: 128 MMHG

## 2021-08-03 PROCEDURE — 99213 OFFICE O/P EST LOW 20 MIN: CPT

## 2021-08-03 NOTE — HISTORY OF PRESENT ILLNESS
[FreeTextEntry1] : f/u HTN [de-identified] : Kelsey presents for follow up.\par \par Last visit, amlodipine was reduced to 5mg to help with some dizziness. She still has some minor nausea. Daugher in law, Kyra, is present. No CP, palpitations, SOB, or LE edema.

## 2021-08-03 NOTE — ASSESSMENT
[FreeTextEntry1] : 1. HTN\par Continue current regimen with amlodipine reduced to 5mg\par \par Labs ordered for her next visit.

## 2021-08-03 NOTE — REVIEW OF SYSTEMS
[Fever] : no fever [Chills] : no chills [Fatigue] : no fatigue [Night Sweats] : no night sweats [Chest Pain] : no chest pain [Palpitations] : no palpitations [Shortness Of Breath] : no shortness of breath [Abdominal Pain] : no abdominal pain

## 2021-08-03 NOTE — PHYSICAL EXAM
[No Acute Distress] : no acute distress [Well Nourished] : well nourished [Well Developed] : well developed [Well-Appearing] : well-appearing [No Respiratory Distress] : no respiratory distress  [No Accessory Muscle Use] : no accessory muscle use [Clear to Auscultation] : lungs were clear to auscultation bilaterally [Normal Rate] : normal rate  [Regular Rhythm] : with a regular rhythm [Normal S1, S2] : normal S1 and S2 [No Murmur] : no murmur heard [No Edema] : there was no peripheral edema

## 2021-08-05 ENCOUNTER — RX RENEWAL (OUTPATIENT)
Age: 86
End: 2021-08-05

## 2021-08-10 ENCOUNTER — RX RENEWAL (OUTPATIENT)
Age: 86
End: 2021-08-10

## 2021-08-23 ENCOUNTER — RX RENEWAL (OUTPATIENT)
Age: 86
End: 2021-08-23

## 2021-08-30 ENCOUNTER — RX RENEWAL (OUTPATIENT)
Age: 86
End: 2021-08-30

## 2021-09-07 ENCOUNTER — RX RENEWAL (OUTPATIENT)
Age: 86
End: 2021-09-07

## 2021-09-08 ENCOUNTER — LABORATORY RESULT (OUTPATIENT)
Age: 86
End: 2021-09-08

## 2021-09-08 ENCOUNTER — RX RENEWAL (OUTPATIENT)
Age: 86
End: 2021-09-08

## 2021-09-17 ENCOUNTER — APPOINTMENT (OUTPATIENT)
Dept: CARDIOLOGY | Facility: CLINIC | Age: 86
End: 2021-09-17
Payer: MEDICARE

## 2021-09-20 ENCOUNTER — LABORATORY RESULT (OUTPATIENT)
Age: 86
End: 2021-09-20

## 2021-09-20 ENCOUNTER — APPOINTMENT (OUTPATIENT)
Dept: INTERNAL MEDICINE | Facility: CLINIC | Age: 86
End: 2021-09-20
Payer: MEDICARE

## 2021-09-20 VITALS — SYSTOLIC BLOOD PRESSURE: 102 MMHG | DIASTOLIC BLOOD PRESSURE: 60 MMHG

## 2021-09-20 VITALS
HEIGHT: 62 IN | DIASTOLIC BLOOD PRESSURE: 75 MMHG | BODY MASS INDEX: 25.03 KG/M2 | WEIGHT: 136 LBS | RESPIRATION RATE: 12 BRPM | HEART RATE: 65 BPM | SYSTOLIC BLOOD PRESSURE: 119 MMHG | OXYGEN SATURATION: 96 %

## 2021-09-20 DIAGNOSIS — E87.6 HYPOKALEMIA: ICD-10-CM

## 2021-09-20 DIAGNOSIS — Z23 ENCOUNTER FOR IMMUNIZATION: ICD-10-CM

## 2021-09-20 DIAGNOSIS — R63.4 ABNORMAL WEIGHT LOSS: ICD-10-CM

## 2021-09-20 PROCEDURE — G0008: CPT

## 2021-09-20 PROCEDURE — 90662 IIV NO PRSV INCREASED AG IM: CPT

## 2021-09-20 PROCEDURE — 36415 COLL VENOUS BLD VENIPUNCTURE: CPT

## 2021-09-20 PROCEDURE — 99214 OFFICE O/P EST MOD 30 MIN: CPT | Mod: 25

## 2021-09-20 NOTE — PHYSICAL EXAM
[No Acute Distress] : no acute distress [Normal Sclera/Conjunctiva] : normal sclera/conjunctiva [No Lymphadenopathy] : no lymphadenopathy [Thyroid Normal, No Nodules] : the thyroid was normal and there were no nodules present [No Abdominal Bruit] : a ~M bruit was not heard ~T in the abdomen [No Edema] : there was no peripheral edema [Normal] : soft, non-tender, non-distended, no masses palpated, no HSM and normal bowel sounds [Normal Supraclavicular Nodes] : no supraclavicular lymphadenopathy [Normal Posterior Cervical Nodes] : no posterior cervical lymphadenopathy [Normal Anterior Cervical Nodes] : no anterior cervical lymphadenopathy [Normal Femoral Nodes] : no femoral lymphadenopathy [No Spinal Tenderness] : no spinal tenderness

## 2021-09-20 NOTE — HISTORY OF PRESENT ILLNESS
[FreeTextEntry1] : ROUTINE FOLLOW UP  [de-identified] : 1. HTN \par 2. HLD \par 3. WEIGHT LOSS : HAS HAD AN 8 LB WEIGHT LOSS OVER THE LAST YEAR WHICH SHE ATTRIBUTES TO CUTTING DOWN ON THE SWEETS \par 4. C/O ANXIETY AT TIMES \par 5. C/O OCCASIONAL NAUSEA WHICH SHE ATTRIBUTES TO SPIRONOLACTONE

## 2021-09-20 NOTE — REVIEW OF SYSTEMS
[Nausea] : nausea [Negative] : Genitourinary [Fever] : no fever [Chills] : no chills [Night Sweats] : no night sweats [Abdominal Pain] : no abdominal pain [Constipation] : no constipation [Diarrhea] : diarrhea [Vomiting] : no vomiting [Melena] : no melena

## 2021-09-21 LAB
ALBUMIN SERPL ELPH-MCNC: 4.2 G/DL
ALP BLD-CCNC: 77 U/L
ALT SERPL-CCNC: 10 U/L
ANION GAP SERPL CALC-SCNC: 14 MMOL/L
AST SERPL-CCNC: 16 U/L
BASOPHILS # BLD AUTO: 0.01 K/UL
BASOPHILS NFR BLD AUTO: 0.1 %
BILIRUB SERPL-MCNC: 0.5 MG/DL
BUN SERPL-MCNC: 23 MG/DL
CALCIUM SERPL-MCNC: 9.1 MG/DL
CHLORIDE SERPL-SCNC: 101 MMOL/L
CO2 SERPL-SCNC: 20 MMOL/L
CREAT SERPL-MCNC: 1 MG/DL
EOSINOPHIL # BLD AUTO: 0.06 K/UL
EOSINOPHIL NFR BLD AUTO: 0.6 %
GLUCOSE SERPL-MCNC: 153 MG/DL
HCT VFR BLD CALC: 33.3 %
HGB BLD-MCNC: 10.7 G/DL
IMM GRANULOCYTES NFR BLD AUTO: 1.3 %
LYMPHOCYTES # BLD AUTO: 1.45 K/UL
LYMPHOCYTES NFR BLD AUTO: 15.6 %
MAN DIFF?: NORMAL
MCHC RBC-ENTMCNC: 29 PG
MCHC RBC-ENTMCNC: 32.1 GM/DL
MCV RBC AUTO: 90.2 FL
MONOCYTES # BLD AUTO: 3.11 K/UL
MONOCYTES NFR BLD AUTO: 33.5 %
NEUTROPHILS # BLD AUTO: 4.54 K/UL
NEUTROPHILS NFR BLD AUTO: 48.9 %
PLATELET # BLD AUTO: 155 K/UL
POTASSIUM SERPL-SCNC: 4.6 MMOL/L
PROT SERPL-MCNC: 6.8 G/DL
RBC # BLD: 3.69 M/UL
RBC # FLD: 14.5 %
SODIUM SERPL-SCNC: 135 MMOL/L
WBC # FLD AUTO: 9.29 K/UL

## 2021-09-29 ENCOUNTER — LABORATORY RESULT (OUTPATIENT)
Age: 86
End: 2021-09-29

## 2021-10-08 NOTE — ED ADULT TRIAGE NOTE - MODE OF ARRIVAL
EMS O-T Plasty Text: The defect edges were debeveled with a #15 scalpel blade.  Given the location of the defect, shape of the defect and the proximity to free margins an O-T plasty was deemed most appropriate.  Using a sterile surgical marker, an appropriate O-T plasty was drawn incorporating the defect and placing the expected incisions within the relaxed skin tension lines where possible.    The area thus outlined was incised deep to adipose tissue with a #15 scalpel blade.  The skin margins were undermined to an appropriate distance in all directions utilizing iris scissors.

## 2021-10-20 ENCOUNTER — APPOINTMENT (OUTPATIENT)
Dept: CARDIOLOGY | Facility: CLINIC | Age: 86
End: 2021-10-20
Payer: MEDICARE

## 2021-10-20 ENCOUNTER — NON-APPOINTMENT (OUTPATIENT)
Age: 86
End: 2021-10-20

## 2021-10-20 VITALS
HEIGHT: 62 IN | OXYGEN SATURATION: 99 % | HEART RATE: 51 BPM | SYSTOLIC BLOOD PRESSURE: 137 MMHG | WEIGHT: 140 LBS | BODY MASS INDEX: 25.76 KG/M2 | DIASTOLIC BLOOD PRESSURE: 80 MMHG

## 2021-10-20 PROCEDURE — 93000 ELECTROCARDIOGRAM COMPLETE: CPT

## 2021-10-20 PROCEDURE — 99214 OFFICE O/P EST MOD 30 MIN: CPT

## 2021-10-20 NOTE — DISCUSSION/SUMMARY
[___ Month(s)] : in [unfilled] month(s) [FreeTextEntry1] : Kelsey is doing well with good recovery from a recent CVA in 4/2020. There were multiple areas in the brain felt to possibly be due to emboli.  A 2 week event monitor did demonstrate self-limited runs of an irregular appearing narrow complex tachycardia, the longest lasting 24 seconds in duration, suggestive of possible paroxysmal atrial fibrillation. Because of this, she will remain on anticoagulation. Her recent pharm stress test was unremarkable. Her EKG continues to demonstrate SR with ant-lat t wave abnormality, and we will repeat her echocardiogram.\par \par Her BP is better overall. She will remain on atenolol, amlodipine and lisinopril.  She has had issues with hypokalemia, and has been unable to tolerate significant potassium supplementation, and will stay on spironolactone at 1/2 dose. \par \par I will see her again in 3 months.

## 2021-10-20 NOTE — HISTORY OF PRESENT ILLNESS
[FreeTextEntry1] : Kelsey is an 86 year old female with HTN, here for follow up.\par \par She was last seen in the office in 6/21.\par \par In 4/2020, she presented to the hospital with symptoms of confusion and disorientation. 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 CT angiogram and MRA of the neck were negative. TTE showed an ejection fraction of 65% with LVH. There was left atrial enlargement with calcification of aortic valve leaflets. The study was felt to be poor in quality. She was discharged on Eliquis and Plavix. Blood work in the hospital was unremarkable. Cholesterol 161, triglycerides 74, HDL 47, and . She has made a good recovery.\par \par Today, she is here for follow up. She feels well. She has no chest pain or dyspnea. She reports no abnormal bleeding.\par She had some nausea on aldactone, and it was decreased it to 1/2.\par She had a 2 week event monitor that demonstrated self-limited runs of an irregular appearing narrow complex tachycardia, the longest lasting 24 seconds in duration, suggestive of possible paroxysmal atrial fibrillation.\par Her BP has been running better as of late.\par A pharmacologic stress test in 2021 was unremarkable. \par \par Her past medical history notable for hypertension, and breast cancer status post radiation. She had been treated with Herceptin.\par

## 2021-10-28 ENCOUNTER — RX RENEWAL (OUTPATIENT)
Age: 86
End: 2021-10-28

## 2021-11-01 ENCOUNTER — APPOINTMENT (OUTPATIENT)
Dept: INTERNAL MEDICINE | Facility: CLINIC | Age: 86
End: 2021-11-01
Payer: MEDICARE

## 2021-11-01 ENCOUNTER — LABORATORY RESULT (OUTPATIENT)
Age: 86
End: 2021-11-01

## 2021-11-01 VITALS
HEIGHT: 62 IN | OXYGEN SATURATION: 95 % | WEIGHT: 140 LBS | SYSTOLIC BLOOD PRESSURE: 131 MMHG | DIASTOLIC BLOOD PRESSURE: 80 MMHG | RESPIRATION RATE: 12 BRPM | HEART RATE: 67 BPM | BODY MASS INDEX: 25.76 KG/M2

## 2021-11-01 PROCEDURE — 99214 OFFICE O/P EST MOD 30 MIN: CPT | Mod: 25

## 2021-11-01 PROCEDURE — 36415 COLL VENOUS BLD VENIPUNCTURE: CPT

## 2021-11-01 NOTE — HISTORY OF PRESENT ILLNESS
[FreeTextEntry1] : routine follow up  [de-identified] : 1. HTN \par 2. HLD \par 3. H/O BREAST CANCER \par FEELS WELL , WEIGHT STABLE

## 2021-11-02 LAB
ALBUMIN SERPL ELPH-MCNC: 4.1 G/DL
ALP BLD-CCNC: 78 U/L
ALT SERPL-CCNC: 11 U/L
ANION GAP SERPL CALC-SCNC: 14 MMOL/L
AST SERPL-CCNC: 17 U/L
BASOPHILS # BLD AUTO: 0.08 K/UL
BASOPHILS NFR BLD AUTO: 0.9 %
BILIRUB SERPL-MCNC: 0.4 MG/DL
BUN SERPL-MCNC: 27 MG/DL
CALCIUM SERPL-MCNC: 8.9 MG/DL
CHLORIDE SERPL-SCNC: 103 MMOL/L
CO2 SERPL-SCNC: 21 MMOL/L
CREAT SERPL-MCNC: 1.07 MG/DL
EOSINOPHIL # BLD AUTO: 0 K/UL
EOSINOPHIL NFR BLD AUTO: 0 %
GLUCOSE SERPL-MCNC: 167 MG/DL
HCT VFR BLD CALC: 31.6 %
HGB BLD-MCNC: 10.4 G/DL
LYMPHOCYTES # BLD AUTO: 2.35 K/UL
LYMPHOCYTES NFR BLD AUTO: 27.6 %
MAN DIFF?: NORMAL
MCHC RBC-ENTMCNC: 30.1 PG
MCHC RBC-ENTMCNC: 32.9 GM/DL
MCV RBC AUTO: 91.3 FL
MONOCYTES # BLD AUTO: 1.83 K/UL
MONOCYTES NFR BLD AUTO: 21.5 %
NEUTROPHILS # BLD AUTO: 4.26 K/UL
NEUTROPHILS NFR BLD AUTO: 50 %
PLATELET # BLD AUTO: 144 K/UL
POTASSIUM SERPL-SCNC: 4.4 MMOL/L
PROT SERPL-MCNC: 6.4 G/DL
RBC # BLD: 3.46 M/UL
RBC # FLD: 14.1 %
SODIUM SERPL-SCNC: 137 MMOL/L
WBC # FLD AUTO: 8.51 K/UL

## 2021-11-03 ENCOUNTER — APPOINTMENT (OUTPATIENT)
Dept: CARDIOLOGY | Facility: CLINIC | Age: 86
End: 2021-11-03
Payer: MEDICARE

## 2021-11-03 PROCEDURE — 93306 TTE W/DOPPLER COMPLETE: CPT

## 2021-11-12 ENCOUNTER — RX RENEWAL (OUTPATIENT)
Age: 86
End: 2021-11-12

## 2021-11-21 ENCOUNTER — RX RENEWAL (OUTPATIENT)
Age: 86
End: 2021-11-21

## 2021-11-26 ENCOUNTER — RX RENEWAL (OUTPATIENT)
Age: 86
End: 2021-11-26

## 2021-12-15 ENCOUNTER — LABORATORY RESULT (OUTPATIENT)
Age: 86
End: 2021-12-15

## 2021-12-15 ENCOUNTER — APPOINTMENT (OUTPATIENT)
Dept: INTERNAL MEDICINE | Facility: CLINIC | Age: 86
End: 2021-12-15
Payer: MEDICARE

## 2021-12-15 VITALS
DIASTOLIC BLOOD PRESSURE: 82 MMHG | SYSTOLIC BLOOD PRESSURE: 127 MMHG | OXYGEN SATURATION: 92 % | WEIGHT: 140 LBS | BODY MASS INDEX: 25.61 KG/M2 | HEART RATE: 65 BPM

## 2021-12-15 PROCEDURE — 99214 OFFICE O/P EST MOD 30 MIN: CPT | Mod: 25

## 2021-12-15 PROCEDURE — 36415 COLL VENOUS BLD VENIPUNCTURE: CPT

## 2021-12-15 NOTE — PHYSICAL EXAM
[No Acute Distress] : no acute distress [Normal Sclera/Conjunctiva] : normal sclera/conjunctiva [No Lymphadenopathy] : no lymphadenopathy [Thyroid Normal, No Nodules] : the thyroid was normal and there were no nodules present [No Edema] : there was no peripheral edema [Normal] : soft, non-tender, non-distended, no masses palpated, no HSM and normal bowel sounds [Normal Supraclavicular Nodes] : no supraclavicular lymphadenopathy [Normal Posterior Cervical Nodes] : no posterior cervical lymphadenopathy [Normal Inguinal Nodes] : no inguinal lymphadenopathy [No CVA Tenderness] : no CVA  tenderness

## 2021-12-15 NOTE — HISTORY OF PRESENT ILLNESS
[FreeTextEntry1] : ROUTINE FOLLOW UP  [de-identified] : 1. HTN \par 2. HLD \par 3. N/N ANEMIA : RECENT IRON STUDIES , B12 , FOLATE \par WNL , STOOL FIT NEG \par 4. H/O BREAST CANCER : UNDER CARE OF ONC

## 2021-12-16 LAB
ALBUMIN SERPL ELPH-MCNC: 4.1 G/DL
ALP BLD-CCNC: 75 U/L
ALT SERPL-CCNC: 17 U/L
ANION GAP SERPL CALC-SCNC: 11 MMOL/L
AST SERPL-CCNC: 18 U/L
BASOPHILS # BLD AUTO: 0.02 K/UL
BASOPHILS NFR BLD AUTO: 0.3 %
BILIRUB SERPL-MCNC: 0.4 MG/DL
BUN SERPL-MCNC: 26 MG/DL
CALCIUM SERPL-MCNC: 9.2 MG/DL
CHLORIDE SERPL-SCNC: 103 MMOL/L
CO2 SERPL-SCNC: 22 MMOL/L
CREAT SERPL-MCNC: 1 MG/DL
EOSINOPHIL # BLD AUTO: 0.17 K/UL
EOSINOPHIL NFR BLD AUTO: 2.8 %
GLUCOSE SERPL-MCNC: 113 MG/DL
HCT VFR BLD CALC: 32.1 %
HGB BLD-MCNC: 10.5 G/DL
IMM GRANULOCYTES NFR BLD AUTO: 0.6 %
LYMPHOCYTES # BLD AUTO: 1.6 K/UL
LYMPHOCYTES NFR BLD AUTO: 25.9 %
MAN DIFF?: NORMAL
MCHC RBC-ENTMCNC: 29.6 PG
MCHC RBC-ENTMCNC: 32.7 GM/DL
MCV RBC AUTO: 90.4 FL
MONOCYTES # BLD AUTO: 1.9 K/UL
MONOCYTES NFR BLD AUTO: 30.7 %
NEUTROPHILS # BLD AUTO: 2.45 K/UL
NEUTROPHILS NFR BLD AUTO: 39.7 %
PLATELET # BLD AUTO: 144 K/UL
POTASSIUM SERPL-SCNC: 4.3 MMOL/L
PROT SERPL-MCNC: 6.4 G/DL
RBC # BLD: 3.55 M/UL
RBC # FLD: 13.2 %
SODIUM SERPL-SCNC: 136 MMOL/L
WBC # FLD AUTO: 6.18 K/UL

## 2021-12-17 NOTE — PHYSICAL EXAM
[Normal Appearance] : normal appearance [General Appearance - Well Developed] : well developed [No Deformities] : no deformities [General Appearance - Well Nourished] : well nourished [Well Groomed] : well groomed [Normal Conjunctiva] : the conjunctiva exhibited no abnormalities [General Appearance - In No Acute Distress] : no acute distress [No Oral Pallor] : no oral pallor [Eyelids - No Xanthelasma] : the eyelids demonstrated no xanthelasmas [Normal Oral Mucosa] : normal oral mucosa [Normal Jugular Venous A Waves Present] : normal jugular venous A waves present [No Oral Cyanosis] : no oral cyanosis [Normal Jugular Venous V Waves Present] : normal jugular venous V waves present [No Jugular Venous Ahmadi A Waves] : no jugular venous ahmadi A waves [Respiration, Rhythm And Depth] : normal respiratory rhythm and effort [Auscultation Breath Sounds / Voice Sounds] : lungs were clear to auscultation bilaterally [Exaggerated Use Of Accessory Muscles For Inspiration] : no accessory muscle use [Abdomen Soft] : soft [Abdomen Tenderness] : non-tender [Abnormal Walk] : normal gait [Abdomen Mass (___ Cm)] : no abdominal mass palpated [Cyanosis, Localized] : no localized cyanosis [Nail Clubbing] : no clubbing of the fingernails [Gait - Sufficient For Exercise Testing] : the gait was sufficient for exercise testing [Skin Color & Pigmentation] : normal skin color and pigmentation [Petechial Hemorrhages (___cm)] : no petechial hemorrhages [] : no rash [No Venous Stasis] : no venous stasis [No Xanthoma] : no  xanthoma was observed [No Skin Ulcers] : no skin ulcer [Skin Lesions] : no skin lesions [Mood] : the mood was normal [Affect] : the affect was normal [Oriented To Time, Place, And Person] : oriented to person, place, and time [Normal Rate] : normal [No Anxiety] : not feeling anxious [Normal S2] : normal S2 [S3] : no S3 [Normal S1] : normal S1 [S4] : no S4 [No Murmur] : no murmurs heard 17-Dec-2021 02:12 [Right Carotid Bruit] : no bruit heard over the right carotid [Left Carotid Bruit] : no bruit heard over the left carotid [Right Femoral Bruit] : no bruit heard over the right femoral artery [Left Femoral Bruit] : no bruit heard over the left femoral artery [2+] : left 2+ [No Pitting Edema] : no pitting edema present [No Abnormalities] : the abdominal aorta was not enlarged and no bruit was heard

## 2021-12-28 ENCOUNTER — RX RENEWAL (OUTPATIENT)
Age: 86
End: 2021-12-28

## 2022-01-10 ENCOUNTER — RX RENEWAL (OUTPATIENT)
Age: 87
End: 2022-01-10

## 2022-01-16 ENCOUNTER — EMERGENCY (EMERGENCY)
Facility: HOSPITAL | Age: 87
LOS: 1 days | Discharge: HOME | End: 2022-01-16
Attending: STUDENT IN AN ORGANIZED HEALTH CARE EDUCATION/TRAINING PROGRAM | Admitting: STUDENT IN AN ORGANIZED HEALTH CARE EDUCATION/TRAINING PROGRAM
Payer: MEDICARE

## 2022-01-16 VITALS
SYSTOLIC BLOOD PRESSURE: 160 MMHG | OXYGEN SATURATION: 98 % | HEART RATE: 63 BPM | RESPIRATION RATE: 16 BRPM | DIASTOLIC BLOOD PRESSURE: 74 MMHG

## 2022-01-16 VITALS
WEIGHT: 138.01 LBS | HEART RATE: 48 BPM | SYSTOLIC BLOOD PRESSURE: 125 MMHG | HEIGHT: 64 IN | OXYGEN SATURATION: 98 % | RESPIRATION RATE: 15 BRPM | DIASTOLIC BLOOD PRESSURE: 57 MMHG | TEMPERATURE: 98 F

## 2022-01-16 DIAGNOSIS — Z98.890 OTHER SPECIFIED POSTPROCEDURAL STATES: Chronic | ICD-10-CM

## 2022-01-16 LAB
ALBUMIN SERPL ELPH-MCNC: 3.7 G/DL — SIGNIFICANT CHANGE UP (ref 3.3–5)
ALP SERPL-CCNC: 80 U/L — SIGNIFICANT CHANGE UP (ref 40–120)
ALT FLD-CCNC: 19 U/L — SIGNIFICANT CHANGE UP (ref 12–78)
ANION GAP SERPL CALC-SCNC: 4 MMOL/L — LOW (ref 5–17)
APPEARANCE UR: ABNORMAL
AST SERPL-CCNC: 17 U/L — SIGNIFICANT CHANGE UP (ref 15–37)
BASOPHILS # BLD AUTO: 0 K/UL — SIGNIFICANT CHANGE UP (ref 0–0.2)
BASOPHILS NFR BLD AUTO: 0 % — SIGNIFICANT CHANGE UP (ref 0–2)
BILIRUB SERPL-MCNC: 0.5 MG/DL — SIGNIFICANT CHANGE UP (ref 0.2–1.2)
BILIRUB UR-MCNC: NEGATIVE — SIGNIFICANT CHANGE UP
BUN SERPL-MCNC: 26 MG/DL — HIGH (ref 7–23)
CALCIUM SERPL-MCNC: 9.1 MG/DL — SIGNIFICANT CHANGE UP (ref 8.5–10.1)
CHLORIDE SERPL-SCNC: 105 MMOL/L — SIGNIFICANT CHANGE UP (ref 96–108)
CO2 SERPL-SCNC: 27 MMOL/L — SIGNIFICANT CHANGE UP (ref 22–31)
COLOR SPEC: SIGNIFICANT CHANGE UP
CREAT SERPL-MCNC: 1.1 MG/DL — SIGNIFICANT CHANGE UP (ref 0.5–1.3)
DIFF PNL FLD: ABNORMAL
EOSINOPHIL # BLD AUTO: 0 K/UL — SIGNIFICANT CHANGE UP (ref 0–0.5)
EOSINOPHIL NFR BLD AUTO: 0 % — SIGNIFICANT CHANGE UP (ref 0–6)
GLUCOSE SERPL-MCNC: 121 MG/DL — HIGH (ref 70–99)
GLUCOSE UR QL: NEGATIVE — SIGNIFICANT CHANGE UP
HCT VFR BLD CALC: 35.2 % — SIGNIFICANT CHANGE UP (ref 34.5–45)
HGB BLD-MCNC: 12.2 G/DL — SIGNIFICANT CHANGE UP (ref 11.5–15.5)
KETONES UR-MCNC: NEGATIVE — SIGNIFICANT CHANGE UP
LEUKOCYTE ESTERASE UR-ACNC: ABNORMAL
LYMPHOCYTES # BLD AUTO: 1.48 K/UL — SIGNIFICANT CHANGE UP (ref 1–3.3)
LYMPHOCYTES # BLD AUTO: 19 % — SIGNIFICANT CHANGE UP (ref 13–44)
MCHC RBC-ENTMCNC: 29.8 PG — SIGNIFICANT CHANGE UP (ref 27–34)
MCHC RBC-ENTMCNC: 34.7 GM/DL — SIGNIFICANT CHANGE UP (ref 32–36)
MCV RBC AUTO: 85.9 FL — SIGNIFICANT CHANGE UP (ref 80–100)
MONOCYTES # BLD AUTO: 2.1 K/UL — HIGH (ref 0–0.9)
MONOCYTES NFR BLD AUTO: 27 % — HIGH (ref 2–14)
NEUTROPHILS # BLD AUTO: 4.2 K/UL — SIGNIFICANT CHANGE UP (ref 1.8–7.4)
NEUTROPHILS NFR BLD AUTO: 51 % — SIGNIFICANT CHANGE UP (ref 43–77)
NITRITE UR-MCNC: NEGATIVE — SIGNIFICANT CHANGE UP
NRBC # BLD: SIGNIFICANT CHANGE UP /100 WBCS (ref 0–0)
PH UR: 6 — SIGNIFICANT CHANGE UP (ref 5–8)
PLATELET # BLD AUTO: 141 K/UL — LOW (ref 150–400)
POTASSIUM SERPL-MCNC: 4.3 MMOL/L — SIGNIFICANT CHANGE UP (ref 3.5–5.3)
POTASSIUM SERPL-SCNC: 4.3 MMOL/L — SIGNIFICANT CHANGE UP (ref 3.5–5.3)
PROT SERPL-MCNC: 7.7 G/DL — SIGNIFICANT CHANGE UP (ref 6–8.3)
PROT UR-MCNC: 30 MG/DL
RBC # BLD: 4.1 M/UL — SIGNIFICANT CHANGE UP (ref 3.8–5.2)
RBC # FLD: 12.8 % — SIGNIFICANT CHANGE UP (ref 10.3–14.5)
SODIUM SERPL-SCNC: 136 MMOL/L — SIGNIFICANT CHANGE UP (ref 135–145)
SP GR SPEC: 1.01 — SIGNIFICANT CHANGE UP (ref 1.01–1.02)
TROPONIN I, HIGH SENSITIVITY RESULT: 5.4 NG/L — SIGNIFICANT CHANGE UP
UROBILINOGEN FLD QL: NEGATIVE — SIGNIFICANT CHANGE UP
WBC # BLD: 7.78 K/UL — SIGNIFICANT CHANGE UP (ref 3.8–10.5)
WBC # FLD AUTO: 7.78 K/UL — SIGNIFICANT CHANGE UP (ref 3.8–10.5)

## 2022-01-16 PROCEDURE — 71045 X-RAY EXAM CHEST 1 VIEW: CPT | Mod: 26

## 2022-01-16 PROCEDURE — 81001 URINALYSIS AUTO W/SCOPE: CPT

## 2022-01-16 PROCEDURE — 93005 ELECTROCARDIOGRAM TRACING: CPT

## 2022-01-16 PROCEDURE — 84484 ASSAY OF TROPONIN QUANT: CPT

## 2022-01-16 PROCEDURE — 93010 ELECTROCARDIOGRAM REPORT: CPT

## 2022-01-16 PROCEDURE — 36415 COLL VENOUS BLD VENIPUNCTURE: CPT

## 2022-01-16 PROCEDURE — 85025 COMPLETE CBC W/AUTO DIFF WBC: CPT

## 2022-01-16 PROCEDURE — 99284 EMERGENCY DEPT VISIT MOD MDM: CPT | Mod: FS

## 2022-01-16 PROCEDURE — 80053 COMPREHEN METABOLIC PANEL: CPT

## 2022-01-16 PROCEDURE — 99284 EMERGENCY DEPT VISIT MOD MDM: CPT | Mod: 25

## 2022-01-16 PROCEDURE — 99284 EMERGENCY DEPT VISIT MOD MDM: CPT

## 2022-01-16 PROCEDURE — 71045 X-RAY EXAM CHEST 1 VIEW: CPT

## 2022-01-16 NOTE — CONSULT NOTE ADULT - SUBJECTIVE AND OBJECTIVE BOX
DOCUMENTATION IN PROGRESS      [facility] - Shanell Martinez Grossman, Wachsman, Pannella, Patel, Goodger, Savella  Office Number: [phone]    Initial Consult Note  CHIEF COMPLAINT: Patient is a 86y old  Female who presents with a chief complaint of   HPI:    Allergies    No Known Allergies    Intolerances      PAST MEDICAL & SURGICAL HISTORY:  Hypertension    Breast cancer    Cerebrovascular accident    H/O lumpectomy  left      MEDICATIONS  (STANDING):    MEDICATIONS  (PRN):    FAMILY HISTORY:  No pertinent family history in first degree relatives      SOCIAL HISTORY    Marital Status:   Occupation:   Lives with:     SUBSTANCE USE  Tobacco Usage:  ( ) None ( ) never smoked   ( ) former smoker  ( ) current smoker; Packs per day:   Alcohol Usage: ( ) none  ( ) occasional ( ) 2-3 times a week ( ) daily; Last drink:   Recreational drugs ( ) None    REVIEW OF SYSTEMS   CONSTITUTIONAL: No fevers, No chills, No fatigue, No weight gain  EYES: No vision changes, No vertigo, No throat pain   ENT: No congestion, No ear pain, No sore throat.  NECK: No pain, No stiffness  RESPIRATORY: No shortness of breath, No cough, No wheezing, No hemoptysis  CARDIOVASCULAR: No chest pain. No palpitations, No MAHAN, No orthopnea, No PND, No pleuritic pain  GASTROINTESTINAL: No abdominal pain, No nausea, No vomiting, No hematemesis, No diarrhea No constipation. No melena  GENITOURINARY: No dysuria, No frequency, No incontinence, No hematuria  NEUROLOGICAL: No dizziness, No lightheadedness, No syncope, No LOC, No headache, No numbness, No weakness  MUSCULOSKELETAL: No joint pain, No joint swelling.  PSYCHIATRIC: No anxiety, No depression  DERMATOLOGY: No diaphoresis. No itching, No rashes, No pressure ulcers  HEME/LYMPH: No easy bruising, or bleeding gums  All other review of systems is negative unless indicated above.    VITAL SIGNS:   Vital Signs Last 24 Hrs  T(C): 36.5 (16 Jan 2022 11:53), Max: 36.5 (16 Jan 2022 11:53)  T(F): 97.7 (16 Jan 2022 11:53), Max: 97.7 (16 Jan 2022 11:53)  HR: 48 (16 Jan 2022 11:53) (48 - 48)  BP: 125/57 (16 Jan 2022 11:53) (125/57 - 125/57)  BP(mean): --  RR: 15 (16 Jan 2022 11:53) (15 - 15)  SpO2: 98% (16 Jan 2022 11:53) (98% - 98%)    Physical Exam:    Appearance: NAD, no distress, alert,   HEENT: Moist Mucous Membranes, Anicteric  Cardiovascular: Regular rate and rhythm, Normal S1 S2, No JVD, No murmurs, No rubs, gallops or clicks  Respiratory: Lungs clear to auscultation. No rales, No rhonchi, No wheezing. No tenderness to palpation  Gastrointestinal:  Soft, Non-tender, + BS  Neurologic: Non-focal  Skin: Warm and dry, No rashes, No ecchymosis, No cyanosis, No ulcers   Musculoskeletal: No clubbing, No cyanosis  Vascular: Peripheral pulses palpable 2+ bilaterally  Psychiatry: Mood & affect appropriate  Lymph: No peripheral edema.     I&O's Summary      LABS: All Labs Reviewed:                        12.2   7.78  )-----------( 141      ( 16 Jan 2022 13:03 )             35.2     16 Jan 2022 13:03    136    |  105    |  26     ----------------------------<  121    4.3     |  27     |  1.10     Ca    9.1        16 Jan 2022 13:03    TPro  7.7    /  Alb  3.7    /  TBili  0.5    /  DBili  x      /  AST  17     /  ALT  19     /  AlkPhos  80     16 Jan 2022 13:03      Troponin I, High Sensitivity Result: 5.4 ng/L (01-16-22 @ 13:03)      Blood Culture:                [facility] - Shanell Martinez, Easton Fink, Florencio, Trino, Ran Flores  Office Number: [phone]    Initial Consult Note  CHIEF COMPLAINT: Patient is a 86y old  Female who presents with a chief complaint of   HPI: 87 yo female had pre-syncopal episode while at [employer] today. Per patient she stood quickly and felt like she was going to pass out. She was assisted by friends and states she was alert the entire time. She felt like she would pass out but never lost consciousness. She knew what was happening.  Has h/o vertigo but does not take medication for that due to "too many meds."   In ED, seen and examined, denies CP or SOB. Denies fever, chills. Denies HA. + dizziness. BP: 147/63, HR 58 EKG SB 58 bpm       Allergies    No Known Allergies    Intolerances      PAST MEDICAL & SURGICAL HISTORY:  Hypertension    Breast cancer    Cerebrovascular accident    H/O lumpectomy  left      MEDICATIONS  (STANDING):    MEDICATIONS  (PRN):    FAMILY HISTORY:  No pertinent family history in first degree relatives      SOCIAL HISTORY    Marital Status:   Occupation:   Lives with:     SUBSTANCE USE  Tobacco Usage:  (x ) None ( ) never smoked   ( ) former smoker  ( ) current smoker; Packs per day:   Alcohol Usage: (x ) none  ( ) occasional ( ) 2-3 times a week ( ) daily; Last drink:   Recreational drugs (x ) None    REVIEW OF SYSTEMS   CONSTITUTIONAL: No fevers, No chills, No fatigue, No weight gain  EYES: No vision changes, No vertigo, No throat pain   ENT: No congestion, No ear pain, No sore throat.  NECK: No pain, No stiffness  RESPIRATORY: No shortness of breath, No cough, No wheezing, No hemoptysis  CARDIOVASCULAR: No chest pain. No palpitations, No MAHAN, No orthopnea, No PND, No pleuritic pain  GASTROINTESTINAL: No abdominal pain, No nausea, No vomiting, No hematemesis, No diarrhea No constipation. No melena  GENITOURINARY: No dysuria, No frequency, No incontinence, No hematuria  NEUROLOGICAL: No dizziness, No lightheadedness, No syncope, No LOC, No headache, No numbness, No weakness  MUSCULOSKELETAL: No joint pain, No joint swelling.  PSYCHIATRIC: No anxiety, No depression  DERMATOLOGY: No diaphoresis. No itching, No rashes, No pressure ulcers  HEME/LYMPH: No easy bruising, or bleeding gums  All other review of systems is negative unless indicated above.    VITAL SIGNS:   Vital Signs Last 24 Hrs  T(C): 36.5 (16 Jan 2022 11:53), Max: 36.5 (16 Jan 2022 11:53)  T(F): 97.7 (16 Jan 2022 11:53), Max: 97.7 (16 Jan 2022 11:53)  HR: 48 (16 Jan 2022 11:53) (48 - 48)  BP: 125/57 (16 Jan 2022 11:53) (125/57 - 125/57)  BP(mean): --  RR: 15 (16 Jan 2022 11:53) (15 - 15)  SpO2: 98% (16 Jan 2022 11:53) (98% - 98%)    Physical Exam:    Appearance: NAD, no distress, alert,   HEENT: Moist Mucous Membranes, Anicteric  Cardiovascular: Regular rate and rhythm, Normal S1 S2, No JVD, No murmurs, No rubs, gallops or clicks  Respiratory: Lungs clear to auscultation. No rales, No rhonchi, No wheezing. No tenderness to palpation  Gastrointestinal:  Soft, Non-tender, + BS  Neurologic: Non-focal  Skin: Warm and dry, No rashes, No ecchymosis, No cyanosis, No ulcers   Musculoskeletal: No clubbing, No cyanosis  Vascular: Peripheral pulses palpable 2+ bilaterally  Psychiatry: Mood & affect appropriate  Lymph: No peripheral edema.     I&O's Summary      LABS: All Labs Reviewed:                        12.2   7.78  )-----------( 141      ( 16 Jan 2022 13:03 )             35.2     16 Jan 2022 13:03    136    |  105    |  26     ----------------------------<  121    4.3     |  27     |  1.10     Ca    9.1        16 Jan 2022 13:03    TPro  7.7    /  Alb  3.7    /  TBili  0.5    /  DBili  x      /  AST  17     /  ALT  19     /  AlkPhos  80     16 Jan 2022 13:03      Troponin I, High Sensitivity Result: 5.4 ng/L (01-16-22 @ 13:03)      Blood Culture:

## 2022-01-16 NOTE — ED PROVIDER NOTE - NSFOLLOWUPINSTRUCTIONS_ED_ALL_ED_FT
Near Syncope    WHAT YOU NEED TO KNOW:    Near syncope, also called presyncope, is the feeling that you may faint (lose consciousness), but you do not. You can control some health conditions that cause near syncope. Your healthcare providers can help you create a plan to manage near syncope and prevent episodes.    DISCHARGE INSTRUCTIONS:    Return to the emergency department if:   •You have sudden chest pain.       •You have trouble breathing or shortness of breath.       •You have vision changes, are sweating, and have nausea while you are sitting or lying down.       •You feel dizzy or flushed and your heart is fluttering.      •You lose consciousness.      •You cannot use your arm, hand, foot, or leg, or it feels weak.      •You have trouble speaking or understanding others when they speak.      Contact your healthcare provider if:   •You have new or worsening symptoms.      •Your heart beats faster or slower than usual.       •You have questions or concerns about your condition or care.      Medicines:   •Medicines may be needed to help your heart pump strongly and regularly. Your healthcare provider may also make changes to any medicines that are causing syncope.       •Take your medicine as directed. Contact your healthcare provider if you think your medicine is not helping or if you have side effects. Tell him or her if you are allergic to any medicine. Keep a list of the medicines, vitamins, and herbs you take. Include the amounts, and when and why you take them. Bring the list or the pill bottles to follow-up visits. Carry your medicine list with you in case of an emergency.      Follow up with your healthcare provider as directed: You may need more tests to help find the cause of your near syncope episodes. The tests will help healthcare providers plan the best treatment for you. Write down your questions so you remember to ask them during your visits.     Manage near syncope:   •Sit or lie down when needed. This includes when you feel dizzy, your throat is getting tight, and your vision changes. Raise your legs above the level of your heart.      •Take slow, deep breaths if you start to breathe faster with anxiety or fear. This can help decrease dizziness and the feeling that you might faint.       •Keep a record of your near syncope episodes. Include your symptoms and your activity before and after the episode. The record can help your healthcare provider find the cause of your near syncope and help you manage episodes.      Prevent a near syncope episode:   •Move slowly and let yourself get used to one position before you move to another position. This is very important when you change from a lying or sitting position to a standing position. Take some deep breaths before you stand up from a lying position. Stand up slowly. Sudden movements may cause a fainting spell. Sit on the side of the bed or couch for a few minutes before you stand up. If you are on bedrest, try to be upright for about 2 hours each day, or as directed. Do not lock your legs if you are standing for a long period of time. Move your legs and bend your knees to keep blood flowing.      •Follow your healthcare provider's recommendations. Your provider may recommend that you drink more liquids to prevent dehydration. You may also need to have more salt to keep your blood pressure from dropping too low and causing syncope. Your provider will tell you how much liquid and sodium to have each day. He or she will also tell you how much physical activity is safe for you. This will depend on what is causing your near syncope.      •Watch for signs of low blood sugar. These include hunger, nervousness, sweating, and fast or fluttery heartbeats. Talk with your healthcare provider about ways to keep your blood sugar level steady.      •Check your blood pressure often. This is important if you take medicine to lower your blood pressure. Check your blood pressure when you are lying down and when you are standing. Ask how often to check during the day. Keep a record of your blood pressure numbers. Your healthcare provider may use the record to help plan your treatment.  How to take a Blood Pressure           •Do not strain if you are constipated. You may faint if you strain to have a bowel movement. Walking is the best way to get your bowels moving. Eat foods high in fiber to make it easier to have a bowel movement. Good examples are high-fiber cereals, beans, vegetables, and whole-grain breads. Prune juice may help make bowel movements softer.      •Do not exercise outside on a hot day. The combination of physical activity and heat can lead to dehydration. This can cause syncope.         © Copyright [employer] 2022           back to top                          © Copyright [employer] 2022

## 2022-01-16 NOTE — ED PROVIDER NOTE - CARE PROVIDER_API CALL
Manuel Tong)  Cardiovascular Disease; Internal Medicine  [address], [place] [id number]  Phone: [phone]  Fax: [phone]  Established Patient  Follow Up Time: 7-10 Days

## 2022-01-16 NOTE — ED PROVIDER NOTE - NEURO NEGATIVE STATEMENT, MLM
no loss of consciousness, no gait abnormality, no headache, no sensory deficits, and ++ generalized weakness., felt that she may pass out.

## 2022-01-16 NOTE — ED PROVIDER NOTE - ATTENDING CONTRIBUTION TO CARE
Patient seen with ACP, substantiative portion of visit including medical decision making done by myself in coordination with ACP. In Brief: 86 F here with lightheaded feeling, happened in [employer]. patient with history of similar. she did not pass out. no associated shortness of breath, chest pain, headache. On exam, patient well appearing, no acute distress, heart regular rate and rhythm, lungs clear to auscultation, abdomen soft, non-tender. check labs, ekg, monitor.

## 2022-01-16 NOTE — ED PROVIDER NOTE - ENMT, MLM
normal...
Airway patent, Nasal mucosa clear. Mouth with normal mucosa. Throat has no vesicles, no oropharyngeal exudates and uvula is midline.  + cerumen b/l ears.

## 2022-01-16 NOTE — ED PROVIDER NOTE - PATIENT PORTAL LINK FT
You can access the FollowMyHealth Patient Portal offered by [facility] by registering at the following website: [email or web address]. By joining [facility]’s FollowMyHealth portal, you will also be able to view your health information using other applications (apps) compatible with our system.

## 2022-01-16 NOTE — ED ADULT NURSE NOTE - OBJECTIVE STATEMENT
Pt presents to the ED via ambulance s/p syncopal episode while in [facility], EKG done , pt placed on cardiac monitor, continuos pulse ox. Pt states" I feel much better now. It was a little warm in the [employer]."

## 2022-01-16 NOTE — ED PROVIDER NOTE - CLINICAL SUMMARY MEDICAL DECISION MAKING FREE TEXT BOX
86 female with pre-syncopal episode today in [employer]. Cardio-[facility].   Labs, trop, ekg, CXR, UA, orthostatic BPs. Fluids.

## 2022-01-16 NOTE — CONSULT NOTE ADULT - ASSESSMENT
83 yo female with PMHx of HTN, breast ca (s/p L lumpectomy 2018) p/w  c/o pre syncopal episode while at [employer] today.    - EKG: NSR with Twave abnormality in Lateral leads, unchanged from prior EKG, no c/o anginal symptoms at present   - check orthostatic vital signs. Start IVF if positive orthostatics   - Patient euvolemic at present.  CXR: clear lungs  - 's, states that she took her antihypertensives this AM, can continue.     - no objection for discharge if neg orthostatics  - will follow up with Dr. Tong pos discharge.     Cinthya Bull, [facility]  Nurse Practitioner - Cardiology   Spectra #[id number]/ [phone]

## 2022-01-16 NOTE — ED ADULT NURSE NOTE - NS TRANSFER PATIENT BELONGINGS
yellow metal necklace/Cell Phone/PDA (specify)/Jewelry/Money (specify)/Clothing yellow metal necklace/Wrist Watch/Cell Phone/PDA (specify)/Jewelry/Money (specify)/Clothing

## 2022-01-16 NOTE — ED PROVIDER NOTE - OBJECTIVE STATEMENT
85 yo female had pre-syncopal episode while at [employer] today.   Per patient she stood quickly and felt like she was going to pass out. She was assisted by friends and states she was alert the entire time. She felt like she would pass out but never lost consciousness. She knew what was happening.   Denies CP or SOB. Denies fever, chills. Denies HA. + dizziness. Has h/o vertigo but does not take medication for that due to "too many meds."   Denies abd pain, n/v/d/c.   No LOC, no head trauma.

## 2022-01-25 ENCOUNTER — APPOINTMENT (OUTPATIENT)
Dept: CARDIOLOGY | Facility: CLINIC | Age: 87
End: 2022-01-25
Payer: MEDICARE

## 2022-01-25 ENCOUNTER — NON-APPOINTMENT (OUTPATIENT)
Age: 87
End: 2022-01-25

## 2022-01-25 VITALS
SYSTOLIC BLOOD PRESSURE: 131 MMHG | HEIGHT: 62 IN | DIASTOLIC BLOOD PRESSURE: 77 MMHG | BODY MASS INDEX: 26.13 KG/M2 | HEART RATE: 55 BPM | WEIGHT: 142 LBS

## 2022-01-25 PROCEDURE — 93000 ELECTROCARDIOGRAM COMPLETE: CPT

## 2022-01-25 PROCEDURE — 99214 OFFICE O/P EST MOD 30 MIN: CPT

## 2022-01-25 NOTE — DISCUSSION/SUMMARY
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 There were multiple areas in the brain felt to possibly be due to emboli.  A 2 week event monitor did demonstrate self-limited runs of an irregular appearing narrow complex tachycardia, the longest lasting 24 seconds in duration, suggestive of possible paroxysmal atrial fibrillation. Because of this, she will remain on anticoagulation. Her recent pharm stress test was unremarkable.\par \par Kelsey had an episode of syncope while at [employer] last week.  There was a typical prodrome, to suggest a vagal etiology.  She is orthostatic today, and her blood pressure dropped to 112, with positional change.  I am going to stop her spironolactone, and she will increase her water intake.  She will also halve her atenolol in the setting of baseline bradycardia.  She needs to be careful with standing.\par \par She will remain on lisinopril and amlodipine at current doses.\par \par I will see her again in 1 month to reevaluate her blood pressure regimen.

## 2022-01-25 NOTE — HISTORY OF PRESENT ILLNESS
[FreeTextEntry1] : Kelsey is an 86 year old female with HTN, here for follow up.\par \par She was last seen in the office in 6/21.\par \par In 4/2020, she presented to the hospital with symptoms of confusion and disorientation. 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 CT angiogram and MRA of the neck were negative. TTE showed an ejection fraction of 65% with LVH. There was left atrial enlargement with calcification of aortic valve leaflets. The study was felt to be poor in quality. She was discharged on Eliquis and Plavix. Blood work in the hospital was unremarkable. Cholesterol 161, triglycerides 74, HDL 47, and . She has made a good recovery.\par \par Today, she is here for follow up. She feels well. She has no chest pain or dyspnea. She reports no abnormal bleeding.\par She had some nausea on aldactone, and it was decreased it to 1/2.\par She had a 2 week event monitor that demonstrated self-limited runs of an irregular appearing narrow complex tachycardia, the longest lasting 24 seconds in duration, suggestive of possible paroxysmal atrial fibrillation.\par Her BP has been running better as of late.\par A pharmacologic stress test in 2021 was unremarkable. \par Since last visit, she had an episode of syncope while at [employer] on 1/16/22. The episode was preceded by a typical prodrome, suggestive of a vagal etiology. She went to the ER with normal evaluation. Echo with normal LV function in 11/21.\par \par Her past medical history notable for hypertension, and breast cancer status post radiation. She had been treated with Herceptin.\par

## 2022-01-28 ENCOUNTER — APPOINTMENT (OUTPATIENT)
Dept: CARDIOLOGY | Facility: CLINIC | Age: 87
End: 2022-01-28
Payer: MEDICARE

## 2022-01-28 PROCEDURE — 93880 EXTRACRANIAL BILAT STUDY: CPT

## 2022-01-29 ENCOUNTER — RX RENEWAL (OUTPATIENT)
Age: 87
End: 2022-01-29

## 2022-01-30 ENCOUNTER — RX RENEWAL (OUTPATIENT)
Age: 87
End: 2022-01-30

## 2022-02-20 ENCOUNTER — RX RENEWAL (OUTPATIENT)
Age: 87
End: 2022-02-20

## 2022-02-28 ENCOUNTER — RX RENEWAL (OUTPATIENT)
Age: 87
End: 2022-02-28

## 2022-03-01 ENCOUNTER — APPOINTMENT (OUTPATIENT)
Dept: CARDIOLOGY | Facility: CLINIC | Age: 87
End: 2022-03-01
Payer: MEDICARE

## 2022-03-01 ENCOUNTER — NON-APPOINTMENT (OUTPATIENT)
Age: 87
End: 2022-03-01

## 2022-03-01 VITALS
HEIGHT: 62 IN | BODY MASS INDEX: 26.13 KG/M2 | SYSTOLIC BLOOD PRESSURE: 129 MMHG | WEIGHT: 142 LBS | HEART RATE: 53 BPM | DIASTOLIC BLOOD PRESSURE: 78 MMHG | OXYGEN SATURATION: 100 %

## 2022-03-01 PROCEDURE — 99214 OFFICE O/P EST MOD 30 MIN: CPT

## 2022-03-01 PROCEDURE — 93000 ELECTROCARDIOGRAM COMPLETE: CPT

## 2022-03-01 NOTE — DISCUSSION/SUMMARY
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 There were multiple areas in the brain felt to possibly be due to emboli.  A 2 week event monitor did demonstrate self-limited runs of an irregular appearing narrow complex tachycardia, the longest lasting 24 seconds in duration, suggestive of possible paroxysmal atrial fibrillation. Because of this, she will remain on anticoagulation. Her recent pharm stress test was unremarkable.\par \par Kelsey had an episode of syncope while at [employer] in 1/2022.  There was a typical prodrome, to suggest a vagal etiology.  She remains mildly orthostatic today (her SBP dropped from 125 to 111), with positional change. She will remain off Aldactone and will increase her water intake.  She needs to be careful with standing. She is reporting some swelling at the end of the day, and should use compression stockings.\par \par She will remain on lisinopril and amlodipine, along with atenolol, at current doses.\par \par I will see her again in 3 month to reevaluate her blood pressure regimen.

## 2022-03-01 NOTE — HISTORY OF PRESENT ILLNESS
[FreeTextEntry1] : Kelsey is an 86 year old female with HTN, here for follow up.\par \par She was last seen in the office in 1/22.\par \par In 4/2020, she presented to the hospital with symptoms of confusion and disorientation. 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 CT angiogram and MRA of the neck were negative. TTE showed an ejection fraction of 65% with LVH. There was left atrial enlargement with calcification of aortic valve leaflets. The study was felt to be poor in quality. She was discharged on Eliquis and Plavix. Blood work in the hospital was unremarkable. Cholesterol 161, triglycerides 74, HDL 47, and . She has made a good recovery.\par \par Today, she is here for follow up. She feels well. She has no chest pain or dyspnea. She reports no abnormal bleeding.\par She had a 2 week event monitor that demonstrated self-limited runs of an irregular appearing narrow complex tachycardia, the longest lasting 24 seconds in duration, suggestive of possible paroxysmal atrial fibrillation.\par Her BP has been running better as of late.\par A pharmacologic stress test in 2021 was unremarkable. \par \par She had an episode of syncope while at [employer] on 1/16/22. The episode was preceded by a typical prodrome, suggestive of a vagal etiology. She went to the ER with normal evaluation. Echo with normal LV function in 11/21. \par She was orthostatic at last visit, and I stopped her aldactone.\par She has not been dizzy with standing and has not passed out.\par She does have mild edema at the end of the day.\par \par Her past medical history notable for hypertension, and breast cancer status post radiation. She had been treated with Herceptin.\par

## 2022-03-22 ENCOUNTER — RX RENEWAL (OUTPATIENT)
Age: 87
End: 2022-03-22

## 2022-04-05 ENCOUNTER — APPOINTMENT (OUTPATIENT)
Dept: INTERNAL MEDICINE | Facility: CLINIC | Age: 87
End: 2022-04-05
Payer: MEDICARE

## 2022-04-05 ENCOUNTER — NON-APPOINTMENT (OUTPATIENT)
Age: 87
End: 2022-04-05

## 2022-04-05 ENCOUNTER — LABORATORY RESULT (OUTPATIENT)
Age: 87
End: 2022-04-05

## 2022-04-05 VITALS
RESPIRATION RATE: 12 BRPM | OXYGEN SATURATION: 98 % | WEIGHT: 143 LBS | DIASTOLIC BLOOD PRESSURE: 99 MMHG | HEART RATE: 65 BPM | HEIGHT: 62 IN | BODY MASS INDEX: 26.31 KG/M2 | SYSTOLIC BLOOD PRESSURE: 186 MMHG

## 2022-04-05 VITALS — DIASTOLIC BLOOD PRESSURE: 80 MMHG | SYSTOLIC BLOOD PRESSURE: 138 MMHG

## 2022-04-05 DIAGNOSIS — Z87.438 PERSONAL HISTORY OF OTHER DISEASES OF MALE GENITAL ORGANS: ICD-10-CM

## 2022-04-05 PROCEDURE — 93000 ELECTROCARDIOGRAM COMPLETE: CPT | Mod: 59

## 2022-04-05 PROCEDURE — G0444 DEPRESSION SCREEN ANNUAL: CPT | Mod: 59

## 2022-04-05 PROCEDURE — 36415 COLL VENOUS BLD VENIPUNCTURE: CPT

## 2022-04-05 PROCEDURE — G0439: CPT

## 2022-04-05 PROCEDURE — 99213 OFFICE O/P EST LOW 20 MIN: CPT | Mod: 25

## 2022-04-05 NOTE — PHYSICAL EXAM
[No Acute Distress] : no acute distress [Normal Sclera/Conjunctiva] : normal sclera/conjunctiva [PERRL] : pupils equal round and reactive to light [EOMI] : extraocular movements intact [Normal TMs] : both tympanic membranes were normal [No Lymphadenopathy] : no lymphadenopathy [Thyroid Normal, No Nodules] : the thyroid was normal and there were no nodules present [No Carotid Bruits] : no carotid bruits [No Abdominal Bruit] : a ~M bruit was not heard ~T in the abdomen [No Edema] : there was no peripheral edema [No Palpable Aorta] : no palpable aorta [Normal Appearance] : normal in appearance [No Masses] : no palpable masses [No Nipple Discharge] : no nipple discharge [No Axillary Lymphadenopathy] : no axillary lymphadenopathy [Normal] : soft, non-tender, non-distended, no masses palpated, no HSM and normal bowel sounds [Normal Supraclavicular Nodes] : no supraclavicular lymphadenopathy [Normal Axillary Nodes] : no axillary lymphadenopathy [Normal Posterior Cervical Nodes] : no posterior cervical lymphadenopathy [Normal Anterior Cervical Nodes] : no anterior cervical lymphadenopathy [Normal Inguinal Nodes] : no inguinal lymphadenopathy [No CVA Tenderness] : no CVA  tenderness [No Spinal Tenderness] : no spinal tenderness [No Skin Lesions] : no skin lesions [Coordination Grossly Intact] : coordination grossly intact [No Focal Deficits] : no focal deficits [Normal Affect] : the affect was normal [Normal Insight/Judgement] : insight and judgment were intact

## 2022-04-05 NOTE — HEALTH RISK ASSESSMENT
[0] : 2) Feeling down, depressed, or hopeless: Not at all (0) [PHQ-2 Negative - No further assessment needed] : PHQ-2 Negative - No further assessment needed [Patient reported mammogram was normal] : Patient reported mammogram was normal [[id number]] : 0 [MammogramDate] : 6/2021 [MammogramComments] : HAS FOLLOW UP

## 2022-04-05 NOTE — HISTORY OF PRESENT ILLNESS
[de-identified] : 1. HTN \par 2. HLD \par 3. H/O BREAST CANCER : Under the care of Dr. Jameson S1\par FEELS WELL \par COMPLIANT W/ ALL MEDS \par In January she had what appeared to be a vasovagal episode while at [employer] and her spironolactone was discontinued by cardiology.  She feels well and denies any chest pain shortness of breath palpitations weight loss.\par

## 2022-04-05 NOTE — ASSESSMENT
[FreeTextEntry1] : -STABLE \par -Continue present medication\par -We will check routine blood work\par -Rhythm strip appears to be a sinus bradycardia, patient on long-term Eliquis

## 2022-04-06 LAB
ALBUMIN SERPL ELPH-MCNC: 4.4 G/DL
ALP BLD-CCNC: 91 U/L
ALT SERPL-CCNC: 10 U/L
ANION GAP SERPL CALC-SCNC: 15 MMOL/L
AST SERPL-CCNC: 21 U/L
BASOPHILS # BLD AUTO: 0.02 K/UL
BASOPHILS NFR BLD AUTO: 0.2 %
BILIRUB SERPL-MCNC: 0.5 MG/DL
BUN SERPL-MCNC: 17 MG/DL
CALCIUM SERPL-MCNC: 9.1 MG/DL
CHLORIDE SERPL-SCNC: 102 MMOL/L
CHOLEST SERPL-MCNC: 132 MG/DL
CO2 SERPL-SCNC: 22 MMOL/L
CREAT SERPL-MCNC: 0.83 MG/DL
EGFR: 69 ML/MIN/1.73M2
EOSINOPHIL # BLD AUTO: 0.08 K/UL
EOSINOPHIL NFR BLD AUTO: 1 %
ESTIMATED AVERAGE GLUCOSE: 134 MG/DL
GLUCOSE SERPL-MCNC: 94 MG/DL
HBA1C MFR BLD HPLC: 6.3 %
HCT VFR BLD CALC: 39 %
HDLC SERPL-MCNC: 52 MG/DL
HGB BLD-MCNC: 12.2 G/DL
IMM GRANULOCYTES NFR BLD AUTO: 0.5 %
LDLC SERPL CALC-MCNC: 64 MG/DL
LYMPHOCYTES # BLD AUTO: 1.77 K/UL
LYMPHOCYTES NFR BLD AUTO: 21.6 %
MAN DIFF?: NORMAL
MCHC RBC-ENTMCNC: 28.6 PG
MCHC RBC-ENTMCNC: 31.3 GM/DL
MCV RBC AUTO: 91.5 FL
MONOCYTES # BLD AUTO: 2.56 K/UL
MONOCYTES NFR BLD AUTO: 31.2 %
NEUTROPHILS # BLD AUTO: 3.74 K/UL
NEUTROPHILS NFR BLD AUTO: 45.5 %
NONHDLC SERPL-MCNC: 80 MG/DL
PLATELET # BLD AUTO: 125 K/UL
POTASSIUM SERPL-SCNC: 4.4 MMOL/L
PROT SERPL-MCNC: 7 G/DL
RBC # BLD: 4.26 M/UL
RBC # FLD: 14 %
SODIUM SERPL-SCNC: 139 MMOL/L
T4 FREE SERPL-MCNC: 1.2 NG/DL
TRIGL SERPL-MCNC: 80 MG/DL
TSH SERPL-ACNC: 0.96 UIU/ML
WBC # FLD AUTO: 8.21 K/UL

## 2022-04-15 NOTE — H&P ADULT - PROBLEM SELECTOR PROBLEM 6
Patient seen for PM rounds  No complaints  Pain well controlled. Tolerating PO intake without N/V.  Passing some flatus but no BM.  Pressure dressing removed. Ecchymoses stable from prior.   No active bleeding. Small hematoma noted on left side of incision.  Patient requesting to go home.   Call parameters reviewed.   Patient cleared for DC    d/w Dr. Garcia Patient seen for PM rounds  No complaints  Pain well controlled. Tolerating PO intake without N/V.  Passing some flatus but no BM.  Pressure dressing removed. Ecchymoses stable from prior.   No active bleeding. Small hematoma noted on left side of incision.  Patient requesting to go home.   Call parameters reviewed.   Patient cleared for DC    d/w Dr. Garcia    Addendum:    Patient seen and examined at bedside.  Subjective Hx, Physical Exam, Laboratory & Imaging results reviewed.  I agree with the Resident Physician's assessment and plan of care, as discussed above.  Call, follow up, and return to Hospital parameters reviewed at length.  She was given the opportunity to ask questions and all were addressed.  Discharge home    Zain Garcia, DO Need for prophylactic measure Advanced directives, counseling/discussion

## 2022-04-19 ENCOUNTER — RX RENEWAL (OUTPATIENT)
Age: 87
End: 2022-04-19

## 2022-05-31 ENCOUNTER — RX RENEWAL (OUTPATIENT)
Age: 87
End: 2022-05-31

## 2022-06-14 ENCOUNTER — APPOINTMENT (OUTPATIENT)
Dept: OTOLARYNGOLOGY | Facility: CLINIC | Age: 87
End: 2022-06-14
Payer: MEDICARE

## 2022-06-14 ENCOUNTER — APPOINTMENT (OUTPATIENT)
Dept: INTERNAL MEDICINE | Facility: CLINIC | Age: 87
End: 2022-06-14
Payer: MEDICARE

## 2022-06-14 VITALS — TEMPERATURE: 98.1 F

## 2022-06-14 VITALS
HEART RATE: 70 BPM | HEIGHT: 64 IN | DIASTOLIC BLOOD PRESSURE: 74 MMHG | WEIGHT: 138 LBS | BODY MASS INDEX: 23.56 KG/M2 | SYSTOLIC BLOOD PRESSURE: 126 MMHG

## 2022-06-14 VITALS
DIASTOLIC BLOOD PRESSURE: 85 MMHG | SYSTOLIC BLOOD PRESSURE: 151 MMHG | TEMPERATURE: 99.3 F | BODY MASS INDEX: 25.24 KG/M2 | OXYGEN SATURATION: 97 % | WEIGHT: 138 LBS | HEART RATE: 63 BPM

## 2022-06-14 DIAGNOSIS — H93.19 TINNITUS, UNSPECIFIED EAR: ICD-10-CM

## 2022-06-14 DIAGNOSIS — H93.13 TINNITUS, BILATERAL: ICD-10-CM

## 2022-06-14 DIAGNOSIS — H61.23 IMPACTED CERUMEN, BILATERAL: ICD-10-CM

## 2022-06-14 DIAGNOSIS — Z82.2 FAMILY HISTORY OF DEAFNESS AND HEARING LOSS: ICD-10-CM

## 2022-06-14 DIAGNOSIS — R42 DIZZINESS AND GIDDINESS: ICD-10-CM

## 2022-06-14 DIAGNOSIS — Z86.79 PERSONAL HISTORY OF OTHER DISEASES OF THE CIRCULATORY SYSTEM: ICD-10-CM

## 2022-06-14 DIAGNOSIS — Z78.9 OTHER SPECIFIED HEALTH STATUS: ICD-10-CM

## 2022-06-14 PROCEDURE — 99213 OFFICE O/P EST LOW 20 MIN: CPT

## 2022-06-14 PROCEDURE — 92567 TYMPANOMETRY: CPT

## 2022-06-14 PROCEDURE — 92557 COMPREHENSIVE HEARING TEST: CPT

## 2022-06-14 PROCEDURE — 99203 OFFICE O/P NEW LOW 30 MIN: CPT | Mod: 25

## 2022-06-14 PROCEDURE — G0268 REMOVAL OF IMPACTED WAX MD: CPT

## 2022-06-14 NOTE — ASSESSMENT
[FreeTextEntry1] : Kelsey Winchester presents for evaluation of bilateral tinnitus and intermittent dizziness. Bilateral cerumen impaction was noted today and removed. Audiogram was performed showing bilateral normal sloping to severe SNHL. We discussed amplificatoin as an option but she would like to defer at this time. Discussed masking noise for tinnitus. For her dizziness, will obtain VNG.\par \par - VNG\par - Follow up after VNG

## 2022-06-14 NOTE — HISTORY OF PRESENT ILLNESS
[de-identified] : Kelsey Winchester is an 88 yo female with hx CVA, atrial fibrillation currently on eliquis, who was referred by Dr. Gusman for evaluation of dizziness and cerumen impaction. Ten days ago, she developed URI symptoms which resolved. She had intermittent dizziness at that time. Yesterday, she developed dizziness again for 10 minutes and intermittent nonpulsatile tinnitus bilatearlly. She denies raegan vertigo. She did not have weakness or numbness of extremities. She denies hearing change, otalgia, otorrhea. She denies fevers, chills, or recurrent ear infections. She denies facial weakness or facial numbness. She states that her symptoms are improved compared to yesterday.

## 2022-06-14 NOTE — PHYSICAL EXAM
[No Acute Distress] : no acute distress [Well Nourished] : well nourished [Well Developed] : well developed [Normal Sclera/Conjunctiva] : normal sclera/conjunctiva [PERRL] : pupils equal round and reactive to light [No Lymphadenopathy] : no lymphadenopathy [Thyroid Normal, No Nodules] : the thyroid was normal and there were no nodules present [No Respiratory Distress] : no respiratory distress  [No Accessory Muscle Use] : no accessory muscle use [Clear to Auscultation] : lungs were clear to auscultation bilaterally [Normal Rate] : normal rate  [Regular Rhythm] : with a regular rhythm [Normal S1, S2] : normal S1 and S2 [No Murmur] : no murmur heard [No Carotid Bruits] : no carotid bruits [No Edema] : there was no peripheral edema [Cranial Nerves Oculomotor (III)] : the extraocular motions were intact [Cranial Nerves Trigeminal (V)] : sensation to the face and masseter strength were intact [Cranial Nerves Facial (VII)] : facial strength was intact bilaterally [Cranial Nerves Vestibulocochlear (VIII)] : hearing was intact [Cranial Nerves Glossopharyngeal (IX)] : there was normal movement of the soft palate and normal gag [Cranial Nerves Accessory (XI - Cranial And Spinal)] : shoulder shrug was intact bilaterally [Sensation Tactile Decrease] : light touch was intact [2+] : left 2+ [Alert and Oriented x3] : oriented to person, place, and time [Limited Balance] : balance was intact [Romberg's Sign] : Romberg's sign was negtive [Past-pointing] : there was no past-pointing [Dysdiadochokinesia Bilaterally] : not present [Coordination - Dysmetria Impaired Finger-to-Nose Bilateral] : not present [Coordination - Dysmetria Impaired Heel-to-Shin Bilateral] : not present [de-identified] : No nystagmus [de-identified] : Cerumen in bilateral ear canals.  Unable to visualize TM

## 2022-06-14 NOTE — ASSESSMENT
[FreeTextEntry1] : 1.  Dizziness\par Suspect vertigo given element of coexisting tinnitus and likely mild viral syndrome\par Her neurologic exam is unrevealing today which makes stroke less likely and she remains on anticoagulation\par Her symptoms started over 10 days ago so would not be a candidate for acute interventions\par Advise ENT and neurology follow-up\par Given that symptoms have resolved as of today, will not prescribe any new medicines at this time\par Return precautions of when to go to ED discussed with patient and her family member

## 2022-06-14 NOTE — PHYSICAL EXAM
[Midline] : trachea located in midline position [Normal] : no rashes [de-identified] : Bilateral cerumen impaction [de-identified] : Wearing upper dentures

## 2022-06-14 NOTE — CONSULT LETTER
[Dear  ___] : Dear  [unfilled], [Consult Letter:] : I had the pleasure of evaluating your patient, [unfilled]. [Please see my note below.] : Please see my note below. [Consult Closing:] : Thank you very much for allowing me to participate in the care of this patient.  If you have any questions, please do not hesitate to contact me. [Sincerely,] : Sincerely, [FreeTextEntry3] : Олег Teran M.D.

## 2022-06-14 NOTE — REVIEW OF SYSTEMS
[Dizziness] : dizziness [Fever] : no fever [Chills] : no chills [Fatigue] : no fatigue [Night Sweats] : no night sweats [Nasal Discharge] : no nasal discharge [Sore Throat] : no sore throat [Chest Pain] : no chest pain [Palpitations] : no palpitations [Shortness Of Breath] : no shortness of breath [Wheezing] : no wheezing [Cough] : no cough [Abdominal Pain] : no abdominal pain [Nausea] : no nausea [Constipation] : no constipation [Diarrhea] : diarrhea [Vomiting] : no vomiting [Headache] : no headache

## 2022-06-14 NOTE — DATA REVIEWED
[de-identified] : Tymps: Type A AU\par Audio: WNL to severe SNHL 250-8000 Hz AU\par Recs: 1. ENT f/u, 2. Re-eval as per MD, 3. Consider use of binaural amplificaition

## 2022-06-14 NOTE — HISTORY OF PRESENT ILLNESS
[Family Member] : family member [FreeTextEntry8] : Kelsey presents with concern of dizziness.  Reports that she initially felt unwell about 10 days ago with mild subjective fevers or chills.  She also had some headache and nasal congestion.  She felt intermittently sick for the next few days.  She also had some dizziness at that time.  She reports that yesterday she had an episode of vertigo where she had ringing in her ears and dizzy sensation.  It resolved on its own.  Today she feels back to baseline.  No residual fevers or chills.  No nasal congestion or cough.  No shortness of breath.  No GI symptoms.  No other new neurologic symptoms

## 2022-06-14 NOTE — REVIEW OF SYSTEMS
[Post Nasal Drip] : post nasal drip [Dizziness] : dizziness [Lightheadedness] : lightheadedness [Ear Noises] : ear noises [Nasal Congestion] : nasal congestion [Negative] : Heme/Lymph [FreeTextEntry1] : Weight loss/pain

## 2022-06-15 ENCOUNTER — APPOINTMENT (OUTPATIENT)
Dept: OTOLARYNGOLOGY | Facility: CLINIC | Age: 87
End: 2022-06-15
Payer: MEDICARE

## 2022-06-15 ENCOUNTER — LABORATORY RESULT (OUTPATIENT)
Age: 87
End: 2022-06-15

## 2022-06-15 PROCEDURE — 92540 BASIC VESTIBULAR EVALUATION: CPT

## 2022-06-15 PROCEDURE — 92538 CALORIC VSTBLR TEST W/REC: CPT | Mod: 52

## 2022-07-21 ENCOUNTER — RX RENEWAL (OUTPATIENT)
Age: 87
End: 2022-07-21

## 2022-08-23 ENCOUNTER — NON-APPOINTMENT (OUTPATIENT)
Age: 87
End: 2022-08-23

## 2022-08-23 ENCOUNTER — APPOINTMENT (OUTPATIENT)
Dept: CARDIOLOGY | Facility: CLINIC | Age: 87
End: 2022-08-23

## 2022-08-23 VITALS
HEART RATE: 62 BPM | SYSTOLIC BLOOD PRESSURE: 161 MMHG | WEIGHT: 137 LBS | DIASTOLIC BLOOD PRESSURE: 62 MMHG | HEIGHT: 64 IN | BODY MASS INDEX: 23.39 KG/M2 | OXYGEN SATURATION: 97 %

## 2022-08-23 VITALS — SYSTOLIC BLOOD PRESSURE: 140 MMHG | DIASTOLIC BLOOD PRESSURE: 68 MMHG

## 2022-08-23 PROCEDURE — 93000 ELECTROCARDIOGRAM COMPLETE: CPT

## 2022-08-23 PROCEDURE — 99214 OFFICE O/P EST MOD 30 MIN: CPT

## 2022-08-23 NOTE — DISCUSSION/SUMMARY
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 There were multiple areas in the brain felt to possibly be due to emboli.  A 2 week event monitor did demonstrate self-limited runs of an irregular appearing narrow complex tachycardia, the longest lasting 24 seconds in duration, suggestive of possible paroxysmal atrial fibrillation. Because of this, she will remain on anticoagulation. Her recent pharm stress test was unremarkable in 2021.\par \par Kelsey had an episode of syncope while at [employer] in 1/2022.  There was a typical prodrome, to suggest a vagal etiology.  She remains mildly orthostatic today (her SBP dropped from 140 to 125), with positional change. She will remain off Aldactone and will increase her water intake.  She needs to be careful with standing. She is reporting some swelling at the end of the day, and should use compression stockings.  She reports feeling funny about 1 hour after taking her morning medications, and she is going to check her blood pressure when she feels this way.\par \par She will remain on lisinopril and amlodipine, along with atenolol, at current doses.\par \par I will see her again in 3-6 months to reevaluate her blood pressure regimen. [EKG obtained to assist in diagnosis and management of assessed problem(s)] : EKG obtained to assist in diagnosis and management of assessed problem(s)

## 2022-08-23 NOTE — HISTORY OF PRESENT ILLNESS
[FreeTextEntry1] : Kelsey is an 87 year old female with HTN, here for follow up.\par \par She was last seen in the office in 3/22.\par \par In 4/2020, she presented to the hospital with symptoms of confusion and disorientation. 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 CT angiogram and MRA of the neck were negative. TTE showed an ejection fraction of 65% with LVH. There was left atrial enlargement with calcification of aortic valve leaflets. The study was felt to be poor in quality. She was discharged on Eliquis and Plavix. Blood work in the hospital was unremarkable. Cholesterol 161, triglycerides 74, HDL 47, and . She has made a good recovery.\par In 2020, she had a 2 week event monitor that demonstrated self-limited runs of an irregular appearing narrow complex tachycardia, the longest lasting 24 seconds in duration, suggestive of possible paroxysmal atrial fibrillation. A pharmacologic stress test in 2021 was unremarkable. \par \par \par Today, she is here for follow up. She feels well. She has no chest pain or dyspnea. She reports no abnormal bleeding.\par Her BP has been running better as of late.\par She has not had any additional syncope.  She does report intermittent dizziness, and has been evaluated for vertigo.\par She does feel off about 1 hour after taking her meds in the morning.\par \par She had an episode of syncope while at [employer] on 1/16/22. The episode was preceded by a typical prodrome, suggestive of a vagal etiology. She went to the ER with normal evaluation. Echo with normal LV function in 11/21. \par She was orthostatic at last visit, and I stopped her aldactone.\par She does have mild edema at the end of the day.\par \par Her past medical history notable for hypertension, and breast cancer status post radiation. She had been treated with Herceptin.\par

## 2022-08-30 ENCOUNTER — RX RENEWAL (OUTPATIENT)
Age: 87
End: 2022-08-30

## 2022-09-06 ENCOUNTER — RX RENEWAL (OUTPATIENT)
Age: 87
End: 2022-09-06

## 2022-09-12 ENCOUNTER — EMERGENCY (EMERGENCY)
Facility: HOSPITAL | Age: 87
LOS: 1 days | Discharge: HOME | End: 2022-09-12
Attending: STUDENT IN AN ORGANIZED HEALTH CARE EDUCATION/TRAINING PROGRAM | Admitting: STUDENT IN AN ORGANIZED HEALTH CARE EDUCATION/TRAINING PROGRAM
Payer: MEDICARE

## 2022-09-12 ENCOUNTER — NON-APPOINTMENT (OUTPATIENT)
Age: 87
End: 2022-09-12

## 2022-09-12 VITALS
HEART RATE: 61 BPM | HEIGHT: 64 IN | WEIGHT: 139.99 LBS | TEMPERATURE: 97 F | RESPIRATION RATE: 16 BRPM | SYSTOLIC BLOOD PRESSURE: 153 MMHG | DIASTOLIC BLOOD PRESSURE: 77 MMHG | OXYGEN SATURATION: 96 %

## 2022-09-12 DIAGNOSIS — Z98.890 OTHER SPECIFIED POSTPROCEDURAL STATES: Chronic | ICD-10-CM

## 2022-09-12 LAB
ALBUMIN SERPL ELPH-MCNC: 3.4 G/DL — SIGNIFICANT CHANGE UP (ref 3.3–5)
ALP SERPL-CCNC: 83 U/L — SIGNIFICANT CHANGE UP (ref 40–120)
ALT FLD-CCNC: 17 U/L — SIGNIFICANT CHANGE UP (ref 12–78)
ANION GAP SERPL CALC-SCNC: 5 MMOL/L — SIGNIFICANT CHANGE UP (ref 5–17)
APTT BLD: 36.2 SEC — HIGH (ref 27.5–35.5)
AST SERPL-CCNC: 18 U/L — SIGNIFICANT CHANGE UP (ref 15–37)
BASOPHILS # BLD AUTO: 0.07 K/UL — SIGNIFICANT CHANGE UP (ref 0–0.2)
BASOPHILS NFR BLD AUTO: 1 % — SIGNIFICANT CHANGE UP (ref 0–2)
BILIRUB SERPL-MCNC: 0.5 MG/DL — SIGNIFICANT CHANGE UP (ref 0.2–1.2)
BUN SERPL-MCNC: 17 MG/DL — SIGNIFICANT CHANGE UP (ref 7–23)
CALCIUM SERPL-MCNC: 8.7 MG/DL — SIGNIFICANT CHANGE UP (ref 8.5–10.1)
CHLORIDE SERPL-SCNC: 108 MMOL/L — SIGNIFICANT CHANGE UP (ref 96–108)
CK MB CFR SERPL CALC: <1 NG/ML — SIGNIFICANT CHANGE UP (ref 0–3.6)
CO2 SERPL-SCNC: 28 MMOL/L — SIGNIFICANT CHANGE UP (ref 22–31)
CREAT SERPL-MCNC: 0.83 MG/DL — SIGNIFICANT CHANGE UP (ref 0.5–1.3)
EGFR: 69 ML/MIN/1.73M2 — SIGNIFICANT CHANGE UP
EOSINOPHIL # BLD AUTO: 0.07 K/UL — SIGNIFICANT CHANGE UP (ref 0–0.5)
EOSINOPHIL NFR BLD AUTO: 1 % — SIGNIFICANT CHANGE UP (ref 0–6)
GLUCOSE SERPL-MCNC: 93 MG/DL — SIGNIFICANT CHANGE UP (ref 70–99)
HCT VFR BLD CALC: 34.9 % — SIGNIFICANT CHANGE UP (ref 34.5–45)
HGB BLD-MCNC: 11.6 G/DL — SIGNIFICANT CHANGE UP (ref 11.5–15.5)
INR BLD: 1.48 RATIO — HIGH (ref 0.88–1.16)
LYMPHOCYTES # BLD AUTO: 1.76 K/UL — SIGNIFICANT CHANGE UP (ref 1–3.3)
LYMPHOCYTES # BLD AUTO: 24 % — SIGNIFICANT CHANGE UP (ref 13–44)
MCHC RBC-ENTMCNC: 28.5 PG — SIGNIFICANT CHANGE UP (ref 27–34)
MCHC RBC-ENTMCNC: 33.2 GM/DL — SIGNIFICANT CHANGE UP (ref 32–36)
MCV RBC AUTO: 85.7 FL — SIGNIFICANT CHANGE UP (ref 80–100)
MONOCYTES # BLD AUTO: 2.72 K/UL — HIGH (ref 0–0.9)
MONOCYTES NFR BLD AUTO: 37 % — HIGH (ref 2–14)
NEUTROPHILS # BLD AUTO: 2.72 K/UL — SIGNIFICANT CHANGE UP (ref 1.8–7.4)
NEUTROPHILS NFR BLD AUTO: 37 % — LOW (ref 43–77)
NRBC # BLD: SIGNIFICANT CHANGE UP /100 WBCS (ref 0–0)
PLATELET # BLD AUTO: 132 K/UL — LOW (ref 150–400)
POTASSIUM SERPL-MCNC: 4.1 MMOL/L — SIGNIFICANT CHANGE UP (ref 3.5–5.3)
POTASSIUM SERPL-SCNC: 4.1 MMOL/L — SIGNIFICANT CHANGE UP (ref 3.5–5.3)
PROT SERPL-MCNC: 7 G/DL — SIGNIFICANT CHANGE UP (ref 6–8.3)
PROTHROM AB SERPL-ACNC: 17.4 SEC — HIGH (ref 10.5–13.4)
RBC # BLD: 4.07 M/UL — SIGNIFICANT CHANGE UP (ref 3.8–5.2)
RBC # FLD: 13.7 % — SIGNIFICANT CHANGE UP (ref 10.3–14.5)
SODIUM SERPL-SCNC: 141 MMOL/L — SIGNIFICANT CHANGE UP (ref 135–145)
TROPONIN I, HIGH SENSITIVITY RESULT: 4.7 NG/L — SIGNIFICANT CHANGE UP
WBC # BLD: 7.35 K/UL — SIGNIFICANT CHANGE UP (ref 3.8–10.5)
WBC # FLD AUTO: 7.35 K/UL — SIGNIFICANT CHANGE UP (ref 3.8–10.5)

## 2022-09-12 PROCEDURE — 80053 COMPREHEN METABOLIC PANEL: CPT

## 2022-09-12 PROCEDURE — 85730 THROMBOPLASTIN TIME PARTIAL: CPT

## 2022-09-12 PROCEDURE — 70450 CT HEAD/BRAIN W/O DYE: CPT | Mod: MA

## 2022-09-12 PROCEDURE — 85610 PROTHROMBIN TIME: CPT

## 2022-09-12 PROCEDURE — 93005 ELECTROCARDIOGRAM TRACING: CPT

## 2022-09-12 PROCEDURE — 84484 ASSAY OF TROPONIN QUANT: CPT

## 2022-09-12 PROCEDURE — 71045 X-RAY EXAM CHEST 1 VIEW: CPT | Mod: 26

## 2022-09-12 PROCEDURE — 99285 EMERGENCY DEPT VISIT HI MDM: CPT | Mod: FS

## 2022-09-12 PROCEDURE — 99285 EMERGENCY DEPT VISIT HI MDM: CPT

## 2022-09-12 PROCEDURE — 85025 COMPLETE CBC W/AUTO DIFF WBC: CPT

## 2022-09-12 PROCEDURE — 99285 EMERGENCY DEPT VISIT HI MDM: CPT | Mod: 25

## 2022-09-12 PROCEDURE — 82553 CREATINE MB FRACTION: CPT

## 2022-09-12 PROCEDURE — 71045 X-RAY EXAM CHEST 1 VIEW: CPT

## 2022-09-12 PROCEDURE — 36415 COLL VENOUS BLD VENIPUNCTURE: CPT

## 2022-09-12 PROCEDURE — 93010 ELECTROCARDIOGRAM REPORT: CPT

## 2022-09-12 PROCEDURE — 70450 CT HEAD/BRAIN W/O DYE: CPT | Mod: 26,MA

## 2022-09-12 RX ORDER — SODIUM CHLORIDE 9 MG/ML
1000 INJECTION INTRAMUSCULAR; INTRAVENOUS; SUBCUTANEOUS ONCE
Refills: 0 | Status: COMPLETED | OUTPATIENT
Start: 2022-09-12 | End: 2022-09-12

## 2022-09-12 RX ADMIN — SODIUM CHLORIDE 1000 MILLILITER(S): 9 INJECTION INTRAMUSCULAR; INTRAVENOUS; SUBCUTANEOUS at 13:32

## 2022-09-12 NOTE — CONSULT NOTE ADULT - ASSESSMENT
86 yo female with PMHx of HTN, breast ca (s/p L lumpectomy 2018) p/w  c/o  syncopal episode while at [employer] yesterday (9/11)     - per patient she has the same syncopal episode x multiple times, likely vagal syncope vs orthostatic, though, she is neg orthostatics today   - EKG: NSR T wave abnormality in Lateral leads, unchanged from prior EKG, no c/o anginal symptoms at present, neg trops  - orthostatic neg, s/p 1L IVF    - TTE: (office record) EF 65%, LVH  - euvolemic on exam at present.  CXR: clear lungs  - - 170's, per daughter and pt she is compliant with her antihypertensives: atenolol, amlodipine, lisinopril  - continue home meds ASA, Eliquis, Plavix, (hx of CVA)   - no objection for discharge , she will make appointment to see Dr. Tong post discharge.     Cinhtya Bull, [facility]  Nurse Practitioner - Cardiology   Spectra #[phone]/ [phone]

## 2022-09-12 NOTE — ED PROVIDER NOTE - PATIENT PORTAL LINK FT
You can access the FollowMyHealth Patient Portal offered by [facility] by registering at the following website: [email or web address]. By joining [employer]’s FollowMyHealth portal, you will also be able to view your health information using other applications (apps) compatible with our system.

## 2022-09-12 NOTE — ED PROVIDER NOTE - CLINICAL SUMMARY MEDICAL DECISION MAKING FREE TEXT BOX
87-year-old female with past medical history of hypertension CVA on Eliquis breast cancer status postlumpectomy hyperlipidemia brought in by daughter status post syncope yesterday.  Patient states she was at [employer] states she was standing and broke into cold sweat, pt then walked to back of [employer] and sat down and had LOC, did not fal denies that she had or any other trauma.  Patient does not recall events states she feels like she went to car and symptoms resolved and felt better.  Patient states this is happened to her in the past as well at [employer].  Denies any chest pain shortness of breath numbness tingling weakness patient states she feels fine today.  Patient states she called her PCP because she needs clearance for cataract surgery and advised to come to the ER. pt feels well today. denies chest pain at any point. has ho orthostatic hypotension, followed by dr alas, will check labs, cards to see     cardio Ran  pcp Chandrakant

## 2022-09-12 NOTE — ED PROVIDER NOTE - OBJECTIVE STATEMENT
87-year-old female with past medical history of hypertension CVA on Eliquis breast cancer status postlumpectomy hyperlipidemia brought in by daughter status post syncope yesterday.  Patient states she was at [employer] sitting down and started to feel sweaty so she got up went with her friend to the back of the [employer] as per friend patient passed out denies that he had or any other trauma.  Patient does not recall events states she feels like she went to car and symptoms resolved and felt better.  Patient states this is happened to her in the past as well at [employer].  Denies any chest pain shortness of breath numbness tingling weakness patient states she feels fine today.  Patient states she called her PCP because she needs clearance for cataract surgery and advised to come to the ER.    cardio Iraidaa  pcp Chandrakant

## 2022-09-12 NOTE — ED ADULT TRIAGE NOTE - CHIEF COMPLAINT QUOTE
Patient arrives with daughter c/o syncope yesterday. Daughter reports hx of syncope and was sent to ER for evaluation. Patient cardiologist is Dr. Tong.

## 2022-09-12 NOTE — ED ADULT NURSE NOTE - CHIEF COMPLAINT QUOTE
Under acceptable control per pt report with acupuncture  cpm      
Well controlled on current medicine   cpm  Check labs next visit  
Patient arrives with daughter c/o syncope yesterday. Daughter reports hx of syncope and was sent to ER for evaluation. Patient cardiologist is Dr. Tong.

## 2022-09-12 NOTE — ED PROVIDER NOTE - NSFOLLOWUPINSTRUCTIONS_ED_ALL_ED_FT
Follow up with cardiologist  return to er for any worsening symptoms                                                                                                                Syncope, Adult    Outline of the head showing blood vessels that supply the brain.   Syncope refers to a condition in which a person temporarily loses consciousness. Syncope may also be called fainting or passing out. It is caused by a sudden decrease in blood flow to the brain. This can happen for a variety of reasons.    Most causes of syncope are not dangerous. It can be triggered by things such as needle sticks, seeing blood, pain, or intense emotion. However, syncope can also be a sign of a serious medical problem, such as a heart abnormality. Other causes can include dehydration, migraines, or taking medicines that lower blood pressure. Your health care provider may do tests to find the reason why you are having syncope.    If you faint, get medical help right away. Call your local emergency services (911 in the [place]      Follow these instructions at home:    Pay attention to any changes in your symptoms. Take these actions to stay safe and to help relieve your symptoms:    Knowing when you may be about to faint   •Signs that you may be about to faint include:  •Feeling dizzy, weak, light-headed, or like the room is spinning.      •Feeling nauseous.      •Seeing spots or seeing all white or all black in your field of vision.      •Having cold, clammy skin or feeling warm and sweaty.      •Hearing ringing in the ears (tinnitus).      •If you start to feel like you might faint, sit or lie down right away. If sitting, put your head down between your legs. If lying down, raise (elevate) your feet above the level of your heart.  •Breathe deeply and steadily. Wait until all the symptoms have passed.      •Have someone stay with you until you feel stable.        Medicines     •Take over-the-counter and prescription medicines only as told by your health care provider.      •If you are taking blood pressure or heart medicine, get up slowly and take several minutes to sit and then stand. This can reduce dizziness and decrease the risk of syncope.      Lifestyle     • Do not drive, use machinery, or play sports until your health care provider says it is okay.      • Do not drink alcohol.      • Do not use any products that contain nicotine or tobacco. These products include cigarettes, chewing tobacco, and vaping devices, such as e-cigarettes. If you need help quitting, ask your health care provider.      •Avoid hot tubs and saunas.      General instructions     •Talk with your health care provider about your symptoms. You may need to have testing to understand the cause of your syncope.      •Drink enough fluid to keep your urine pale yellow.    •Avoid prolonged standing. If you must stand for a long time, do movements such as:  •Moving your legs.      •Crossing your legs.      •Flexing and stretching your leg muscles.      •Squatting.        •Keep all follow-up visits. This is important.        Contact a health care provider if:    •You have episodes of near fainting.        Get help right away if:    •You faint.      •You hit your head or are injured after fainting.    •You have any of these symptoms that may indicate trouble with your heart:  •Fast or irregular heartbeats (palpitations).      •Unusual pain in your chest, abdomen, or back.      •Shortness of breath.        •You have a seizure.      •You have a severe headache.      •You are confused.      •You have vision problems.      •You have severe weakness or trouble walking.      •You are bleeding from your mouth or rectum, or you have black or tarry stool.      These symptoms may represent a serious problem that is an emergency. Do not wait to see if your symptoms will go away. Get medical help right away. Call your local emergency services (911 in the [place] Do not drive yourself to the hospital.       Summary    •Syncope refers to a condition in which a person temporarily loses consciousness. Syncope may also be called fainting or passing out. It is caused by a sudden decrease in blood flow to the brain.      •Signs that you may be about to faint include dizziness, feeling light-headed, feeling nauseous, sudden vision changes, or cold, clammy skin.      •Even though most causes of syncope are not dangerous, syncope can be a sign of a serious medical problem. Get help right away if you faint.      •If you start to feel like you might faint, sit or lie down right away. If sitting, put your head down between your legs. If lying down, raise (elevate) your feet above the level of your heart.      This information is not intended to replace advice given to you by your health care provider. Make sure you discuss any questions you have with your health care provider.      Document Revised: 04/28/2022 Document Reviewed: 04/28/2022    [employer] Patient Education © 2022 [employer]

## 2022-09-12 NOTE — ED PROVIDER NOTE - NS ED ATTENDING STATEMENT MOD
This was a shared visit with the TR[facility]. I reviewed and verified the documentation and independently performed the documented:

## 2022-09-12 NOTE — ED PROVIDER NOTE - CARE PROVIDER_API CALL
Manuel Tong)  Cardiovascular Disease; Internal Medicine  [address], [place] [id number]  Phone: [phone]  Fax: [phone]  Follow Up Time:

## 2022-09-12 NOTE — CONSULT NOTE ADULT - SUBJECTIVE AND OBJECTIVE BOX
DOCUMENTATION IN PROGRESS  [facility] - Shanell Martinez, Easton Fink, Florencio, Trino, Ran Flores  Office Number: [phone]    Initial Consult Note  CHIEF COMPLAINT: Patient is a 87y old  Female who presents with a chief complaint of   HPI:87-year-old female with past medical history of hypertension CVA on Eliquis breast cancer status postlumpectomy hyperlipidemia brought in by daughter status post syncope yesterday.  Patient states she was at [employer] sitting down and started to feel sweaty so she got up went with her friend to the back of the [employer] as per friend patient passed out denies that he had or any other trauma.  Patient does not recall events states she feels like she went to car and symptoms resolved and felt better.  Patient states this is happened to her in the past as well at [employer].  Denies any chest pain shortness of breath numbness tingling weakness patient states she feels fine today.  Patient states she called her PCP because she needs clearance for cataract surgery and advised to come to the ER.    Allergies    No Known Allergies    Intolerances      PAST MEDICAL & SURGICAL HISTORY:  Hypertension      Breast cancer      Cerebrovascular accident      H/O lumpectomy  left        MEDICATIONS  (STANDING):  sodium chloride 0.9% Bolus 1000 milliLiter(s) IV Bolus once    MEDICATIONS  (PRN):    FAMILY HISTORY:    No family history of acute MI or sudden cardiac death.  SOCIAL HISTORY:  No tobacco, ethanol, or drug abuse.  REVIEW OF SYSTEMS   All other review of systems is negative unless indicated above.    VITAL SIGNS:   Vital Signs Last 24 Hrs  T(C): 36.1 (12 Sep 2022 12:19), Max: 36.1 (12 Sep 2022 12:19)  T(F): 97 (12 Sep 2022 12:19), Max: 97 (12 Sep 2022 12:19)  HR: 61 (12 Sep 2022 12:19) (61 - 61)  BP: 153/77 (12 Sep 2022 12:19) (153/77 - 153/77)  BP(mean): --  RR: 16 (12 Sep 2022 12:19) (16 - 16)  SpO2: 96% (12 Sep 2022 12:19) (96% - 96%)    Parameters below as of 12 Sep 2022 12:19  Patient On (Oxygen Delivery Method): room air        Physical Exam:    Appearance: NAD, no distress, alert,   HEENT: Moist Mucous Membranes, Anicteric  Cardiovascular: Regular rate and rhythm, Normal S1 S2, No JVD, No murmurs, No rubs, gallops or clicks  Respiratory: Lungs clear to auscultation. No rales, No rhonchi, No wheezing. No tenderness to palpation  Gastrointestinal:  Soft, Non-tender, + BS  Neurologic: Non-focal  Skin: Warm and dry, No rashes, No ecchymosis, No cyanosis, No ulcers   Musculoskeletal: No clubbing, No cyanosis  Vascular: Peripheral pulses palpable 2+ bilaterally  Psychiatry: Mood & affect appropriate  Lymph: No peripheral edema.     I&O's Summary      LABS: All Labs Reviewed:                Blood Culture:                [facility] - Easton Reece, Florencio, Trino, Ran Flores  Office Number: [phone]    Initial Consult Note  CHIEF COMPLAINT: Patient is a 87y old  Female who presents with a chief complaint of   HPI:87-year-old female with past medical history of hypertension CVA on Eliquis breast cancer status postlumpectomy hyperlipidemia brought in by daughter status post syncope yesterday.  Patient states she was at [employer] sitting down and started to feel sweaty so she got up went with her friend to the back of the [employer] as per friend patient passed out denies that he had or any other trauma.  Patient does not recall events states she feels like she went to car and symptoms resolved and felt better.  Patient states this is happened to her in the past as well at [employer].  Denies any chest pain shortness of breath numbness tingling weakness patient states she feels fine today.  Patient states she called her PCP because she needs clearance for cataract surgery and advised to come to the ER.    In ED, pt seen and examined, daughter at bedside, she denies any chest pain, discomfort, palpitations, lightheadedness, dizziness, states that she is feeling much better.  /71 HR 56, tolerating RA.      Allergies    No Known Allergies    Intolerances      PAST MEDICAL & SURGICAL HISTORY:  Hypertension      Breast cancer      Cerebrovascular accident      H/O lumpectomy  left        MEDICATIONS  (STANDING):  sodium chloride 0.9% Bolus 1000 milliLiter(s) IV Bolus once    MEDICATIONS  (PRN):    FAMILY HISTORY:    No family history of acute MI or sudden cardiac death.  SOCIAL HISTORY:  No tobacco, ethanol, or drug abuse.  REVIEW OF SYSTEMS   All other review of systems is negative unless indicated above.    VITAL SIGNS:   Vital Signs Last 24 Hrs  T(C): 36.1 (12 Sep 2022 12:19), Max: 36.1 (12 Sep 2022 12:19)  T(F): 97 (12 Sep 2022 12:19), Max: 97 (12 Sep 2022 12:19)  HR: 61 (12 Sep 2022 12:19) (61 - 61)  BP: 153/77 (12 Sep 2022 12:19) (153/77 - 153/77)  BP(mean): --  RR: 16 (12 Sep 2022 12:19) (16 - 16)  SpO2: 96% (12 Sep 2022 12:19) (96% - 96%)    Parameters below as of 12 Sep 2022 12:19  Patient On (Oxygen Delivery Method): room air        Physical Exam:    Appearance: NAD, no distress, alert,   HEENT: Moist Mucous Membranes, Anicteric  Cardiovascular: Regular rate and rhythm, Normal S1 S2, No JVD, No murmurs, No rubs, gallops or clicks  Respiratory: Lungs clear to auscultation. No rales, No rhonchi, No wheezing. No tenderness to palpation  Gastrointestinal:  Soft, Non-tender, + BS  Neurologic: Non-focal  Skin: Warm and dry, No rashes, No ecchymosis, No cyanosis, No ulcers   Musculoskeletal: No clubbing, No cyanosis  Vascular: Peripheral pulses palpable 2+ bilaterally  Psychiatry: Mood & affect appropriate  Lymph: No peripheral edema.     I&O's Summary      LABS: All Labs Reviewed:                Blood Culture:

## 2022-09-15 ENCOUNTER — APPOINTMENT (OUTPATIENT)
Dept: INTERNAL MEDICINE | Facility: CLINIC | Age: 87
End: 2022-09-15

## 2022-09-16 ENCOUNTER — NON-APPOINTMENT (OUTPATIENT)
Age: 87
End: 2022-09-16

## 2022-09-16 ENCOUNTER — APPOINTMENT (OUTPATIENT)
Dept: CARDIOLOGY | Facility: CLINIC | Age: 87
End: 2022-09-16

## 2022-09-16 VITALS — DIASTOLIC BLOOD PRESSURE: 80 MMHG | SYSTOLIC BLOOD PRESSURE: 140 MMHG

## 2022-09-16 VITALS
BODY MASS INDEX: 23.56 KG/M2 | OXYGEN SATURATION: 100 % | HEART RATE: 53 BPM | DIASTOLIC BLOOD PRESSURE: 80 MMHG | SYSTOLIC BLOOD PRESSURE: 154 MMHG | HEIGHT: 64 IN | WEIGHT: 138 LBS

## 2022-09-16 VITALS — DIASTOLIC BLOOD PRESSURE: 82 MMHG | SYSTOLIC BLOOD PRESSURE: 148 MMHG

## 2022-09-16 DIAGNOSIS — R55 SYNCOPE AND COLLAPSE: ICD-10-CM

## 2022-09-16 PROCEDURE — 93000 ELECTROCARDIOGRAM COMPLETE: CPT

## 2022-09-16 PROCEDURE — 99214 OFFICE O/P EST MOD 30 MIN: CPT

## 2022-09-16 RX ORDER — SPIRONOLACTONE 25 MG/1
25 TABLET ORAL
Qty: 90 | Refills: 2 | Status: COMPLETED | COMMUNITY
Start: 2021-06-14 | End: 2022-09-16

## 2022-09-16 NOTE — HISTORY OF PRESENT ILLNESS
[FreeTextEntry1] : Kelsey is an 87 year old female with a history of hypertension who presented to the emergency room at [facility] 9/12/22with a chief complaint of a post syncopal episode that occurred while she was in [employer]. \par  The patient does have a history of a CVA and is on Eliquis.  She also has a history of a left breast lumpectomy, and hyperlipidemia. \par  The patient states she was at [employer] sitting down and started to feel sweaty so she got up with her friend to go to the back of the [employer] and as she went to get up, she  passed out.  She does not recall the events that occurred but she states that she felt better when she was brought to the car and her symptoms resolved rather quickly.  This is an event that is occurred to her in the past as well as it happening in [employer] prior to this episode.  She denies any chest pain, shortness of breath, numbness, tingling or weakness.  She states she feels well. Her fluid intake is poor as stated by her daughter in law. She is anticipating to obtain medical and cardiac clearance for cataract surgery.. \par \par She was last seen in the office in 8/23/22 by Dr Tong.\par \par In 4/2020, she presented to the hospital with symptoms of confusion and disorientation. 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 CT angiogram and MRA of the neck were negative. TTE showed an ejection fraction of 65% with LVH. There was left atrial enlargement with calcification of aortic valve leaflets. The study was felt to be poor in quality. She was discharged on Eliquis and Plavix. Blood work in the hospital was unremarkable. Cholesterol 161, triglycerides 74, HDL 47, and . She has made a good recovery.\par In 2020, she had a 2 week event monitor that demonstrated self-limited runs of an irregular appearing narrow complex tachycardia, the longest lasting 24 seconds in duration, suggestive of possible paroxysmal atrial fibrillation. A pharmacologic stress test in 2021 was unremarkable. \par \par \par  She feels well today.  She has no chest pain or dyspnea. She reports no abnormal bleeding.\par Her BP sitting was 148/82 and standing was 140/80. No orthostatic hypotension was noted.\par \par She had an episode of syncope while at [employer] on 1/16/22. The episode was preceded by a typical prodrome, suggestive of a vagal etiology. She went to the ER with normal evaluation. Echo with normal LV function in 11/21. \par She was orthostatic at last visit, and Dr Tong stopped her aldactone.\par \par She does have mild edema at the end of the day.. During the summer she does not like to wear compression stockings.  Today there is no ankle edema noted.  She is presently taking amlodipine 5 mg daily, aspirin 81 mg p.o. daily, atenolol 50 mg 1 tablet daily, [id number] mg 1 tablet p.o. daily, Eliquis 5 mg 1 tablet twice daily, as well as lisinopril 40 mg 1 tablet p.o. daily\par \par Her past medical history notable for hypertension, and breast cancer status post radiation. She had been treated with Herceptin.\par

## 2022-09-16 NOTE — DISCUSSION/SUMMARY
[With ___] : with [unfilled] [FreeTextEntry1] : Kelsey had a CVA in 4/2020. There were multiple areas in the brain felt to possibly be due to emboli.  A 2 week event monitor did demonstrate self-limited runs of an irregular appearing narrow complex tachycardia, the longest lasting 24 seconds in duration, suggestive of possible paroxysmal atrial fibrillation. Because of this, she will remain on anticoagulation. Her recent pharm stress test was unremarkable in 2021.\par \par Kelsey had an episode of syncope while at [employer] this month and it also occurred  in 1/20/22.  There was a typical prodrome, to suggest a vagal etiology. She went to the Hospital for evaluation and it was noted to be a vagal episode probably due to dehydration and poor fluid intake. She was not orthostatic today -(her SBP dropped from 148/82 sitting to 140/80 standing), with the positional change. She will remain off Aldactone and will increase her water intake.  She needs to be careful with standing. She is reporting some swelling at the end of the day, and I have encouraged her to use compression stockiings.\par \par She will remain on her current medications. \par \par Dr Tong will  see her again in the office in January 2023  to reevaluate her blood pressure regimen.\par In the meantime , she is scheduled for cataract surgery and needs cardiac clearance. She is hemodynamically stable today. There is no orthostatic hypotension noted today.\par  Her EKG revealed sinus bradycardia with nonspecific T wave abnormality. Poor R wave progression was noted in leads V1 - V3. There is no change in the EKG from 8/23/22.\par \par   The patient is optimized for her upcoming procedure involving the removal of a cataract.  There are no cardiac contraindications.  There is no evidence of active ischemic heart disease, decompensated heart failure, uncontrolled arrhythmia, or severe obstructive valvular disease.  Routine hemodynamic monitoring will be sufficient.  Eliquis can be continued or can be held the day of surgery as per the surgeon's request..Further recommendations will be based on her clinical course.\par  [EKG obtained to assist in diagnosis and management of assessed problem(s)] : EKG obtained to assist in diagnosis and management of assessed problem(s)

## 2022-09-16 NOTE — REASON FOR VISIT
[Symptom and Test Evaluation] : symptom and test evaluation [Family Member] : family member [Other: ____] : [unfilled]

## 2022-09-27 ENCOUNTER — RX RENEWAL (OUTPATIENT)
Age: 87
End: 2022-09-27

## 2022-10-04 ENCOUNTER — APPOINTMENT (OUTPATIENT)
Dept: INTERNAL MEDICINE | Facility: CLINIC | Age: 87
End: 2022-10-04

## 2022-10-04 VITALS
WEIGHT: 136 LBS | DIASTOLIC BLOOD PRESSURE: 84 MMHG | HEIGHT: 64 IN | BODY MASS INDEX: 23.22 KG/M2 | HEART RATE: 67 BPM | SYSTOLIC BLOOD PRESSURE: 152 MMHG | OXYGEN SATURATION: 93 %

## 2022-10-04 VITALS — SYSTOLIC BLOOD PRESSURE: 142 MMHG | DIASTOLIC BLOOD PRESSURE: 84 MMHG

## 2022-10-04 DIAGNOSIS — Z01.818 ENCOUNTER FOR OTHER PREPROCEDURAL EXAMINATION: ICD-10-CM

## 2022-10-04 DIAGNOSIS — Z23 ENCOUNTER FOR IMMUNIZATION: ICD-10-CM

## 2022-10-04 PROCEDURE — 99214 OFFICE O/P EST MOD 30 MIN: CPT | Mod: 25

## 2022-10-04 PROCEDURE — G0008: CPT

## 2022-10-04 PROCEDURE — 90662 IIV NO PRSV INCREASED AG IM: CPT

## 2022-10-04 NOTE — PHYSICAL EXAM
[No Acute Distress] : no acute distress [Well Nourished] : well nourished [Well Developed] : well developed [Well-Appearing] : well-appearing [Normal Sclera/Conjunctiva] : normal sclera/conjunctiva [No Lymphadenopathy] : no lymphadenopathy [Thyroid Normal, No Nodules] : the thyroid was normal and there were no nodules present [No Respiratory Distress] : no respiratory distress  [No Accessory Muscle Use] : no accessory muscle use [Clear to Auscultation] : lungs were clear to auscultation bilaterally [Normal Rate] : normal rate  [Regular Rhythm] : with a regular rhythm [Normal S1, S2] : normal S1 and S2 [No Murmur] : no murmur heard [No Carotid Bruits] : no carotid bruits [No Abdominal Bruit] : a ~M bruit was not heard ~T in the abdomen [No Edema] : there was no peripheral edema [Soft] : abdomen soft [Non Tender] : non-tender [Non-distended] : non-distended [Normal Bowel Sounds] : normal bowel sounds [Normal Supraclavicular Nodes] : no supraclavicular lymphadenopathy [Normal Posterior Cervical Nodes] : no posterior cervical lymphadenopathy [Normal Anterior Cervical Nodes] : no anterior cervical lymphadenopathy [Normal Gait] : normal gait [Alert and Oriented x3] : oriented to person, place, and time

## 2022-10-04 NOTE — ASSESSMENT
[Patient Optimized for Surgery] : Patient optimized for surgery [As per surgery] : as per surgery [FreeTextEntry4] : Kelsey presents today for preoperative risk assessment.  She is moderate risk for this low risk procedure.  She will continue her medications as prescribed.  Her cardiac preoperative assessment attached to this note. [FreeTextEntry5] : She will continue her Eliquis [FreeTextEntry6] : She will continue her baby aspirin

## 2022-10-04 NOTE — HISTORY OF PRESENT ILLNESS
[Atrial Fibrillation] : atrial fibrillation [No Pertinent Pulmonary History] : no history of asthma, COPD, sleep apnea, or smoking [Family Member] : no family member with adverse anesthesia reaction/sudden death [Self] : no previous adverse anesthesia reaction [Chronic Anticoagulation] : chronic anticoagulation [Chronic Kidney Disease] : no chronic kidney disease [Diabetes] : no diabetes [(Patient denies any chest pain, claudication, dyspnea on exertion, orthopnea, palpitations or syncope)] : Patient denies any chest pain, claudication, dyspnea on exertion, orthopnea, palpitations or syncope [Moderate (4-6 METs)] : Moderate (4-6 METs) [Anti-Platelet Agents: _____] : Anti-Platelet Agents: [unfilled] [Anticoagulants: _____] : Anticoagulants: [unfilled] [FreeTextEntry1] : cataract surgery [FreeTextEntry2] : 10/11/22, 10/25/22 [FreeTextEntry3] : [facility]  [FreeTextEntry4] : Kelsey presents for preoperative risk assessment.  She has a history of hypertension, prior CVA, and paroxysmal A. fib.  She had a recent syncopal event and was evaluated by cardiology.  She had a cardiac follow-up and preoperative assessment on 9/16/2022.  Her EKG at that time was unchanged.  The syncopal episode was favored to be vasovagal in nature.  She has had no new chest pain, palpitations, shortness of breath, dizziness, or lightheadedness.  No new leg swelling.  No recurrent syncopal episodes.  No change in her functional status. [FreeTextEntry7] : See cardiac consultation attached to this note

## 2022-10-04 NOTE — REVIEW OF SYSTEMS
[Fever] : no fever [Chills] : no chills [Fatigue] : no fatigue [Night Sweats] : no night sweats [Vision Problems] : no vision problems [Nasal Discharge] : no nasal discharge [Sore Throat] : no sore throat [Chest Pain] : no chest pain [Palpitations] : no palpitations [Shortness Of Breath] : no shortness of breath [Wheezing] : no wheezing [Cough] : no cough [Abdominal Pain] : no abdominal pain [Nausea] : no nausea [Constipation] : no constipation [Diarrhea] : diarrhea [Vomiting] : no vomiting [Dysuria] : no dysuria [Hematuria] : no hematuria [Joint Pain] : no joint pain [Muscle Pain] : no muscle pain [Headache] : no headache [Dizziness] : no dizziness

## 2022-10-24 ENCOUNTER — RX RENEWAL (OUTPATIENT)
Age: 87
End: 2022-10-24

## 2022-10-25 ENCOUNTER — RX RENEWAL (OUTPATIENT)
Age: 87
End: 2022-10-25

## 2022-11-08 ENCOUNTER — LABORATORY RESULT (OUTPATIENT)
Age: 87
End: 2022-11-08

## 2022-11-08 ENCOUNTER — APPOINTMENT (OUTPATIENT)
Dept: INTERNAL MEDICINE | Facility: CLINIC | Age: 87
End: 2022-11-08

## 2022-11-08 VITALS
WEIGHT: 134 LBS | BODY MASS INDEX: 22.88 KG/M2 | TEMPERATURE: 97.4 F | OXYGEN SATURATION: 96 % | HEART RATE: 77 BPM | HEIGHT: 64 IN | DIASTOLIC BLOOD PRESSURE: 74 MMHG | RESPIRATION RATE: 12 BRPM | SYSTOLIC BLOOD PRESSURE: 166 MMHG

## 2022-11-08 DIAGNOSIS — M25.473 EFFUSION, UNSPECIFIED ANKLE: ICD-10-CM

## 2022-11-08 PROCEDURE — 36415 COLL VENOUS BLD VENIPUNCTURE: CPT

## 2022-11-08 PROCEDURE — 99214 OFFICE O/P EST MOD 30 MIN: CPT | Mod: 25

## 2022-11-09 LAB
ANION GAP SERPL CALC-SCNC: 10 MMOL/L
BASOPHILS # BLD AUTO: 0 K/UL
BASOPHILS NFR BLD AUTO: 0 %
BUN SERPL-MCNC: 15 MG/DL
CALCIUM SERPL-MCNC: 9.2 MG/DL
CHLORIDE SERPL-SCNC: 101 MMOL/L
CO2 SERPL-SCNC: 27 MMOL/L
CREAT SERPL-MCNC: 0.74 MG/DL
EGFR: 79 ML/MIN/1.73M2
EOSINOPHIL # BLD AUTO: 0.09 K/UL
EOSINOPHIL NFR BLD AUTO: 0.9 %
GLUCOSE SERPL-MCNC: 136 MG/DL
HCT VFR BLD CALC: 35.9 %
HGB BLD-MCNC: 12 G/DL
LYMPHOCYTES # BLD AUTO: 2.66 K/UL
LYMPHOCYTES NFR BLD AUTO: 27.8 %
MAN DIFF?: NORMAL
MCHC RBC-ENTMCNC: 28.4 PG
MCHC RBC-ENTMCNC: 33.4 GM/DL
MCV RBC AUTO: 84.9 FL
MONOCYTES # BLD AUTO: 2.5 K/UL
MONOCYTES NFR BLD AUTO: 26.1 %
NEUTROPHILS # BLD AUTO: 4.32 K/UL
NEUTROPHILS NFR BLD AUTO: 45.2 %
PLATELET # BLD AUTO: 135 K/UL
POTASSIUM SERPL-SCNC: 4.1 MMOL/L
RBC # BLD: 4.23 M/UL
RBC # FLD: 13.8 %
SODIUM SERPL-SCNC: 138 MMOL/L
URATE SERPL-MCNC: 4.2 MG/DL
WBC # FLD AUTO: 9.56 K/UL

## 2022-11-09 NOTE — REVIEW OF SYSTEMS
[Fever] : no fever [Chills] : no chills [Fatigue] : no fatigue [Night Sweats] : no night sweats [Chest Pain] : no chest pain [Palpitations] : no palpitations [Shortness Of Breath] : no shortness of breath [Abdominal Pain] : no abdominal pain [Joint Pain] : joint pain [Joint Swelling] : joint swelling

## 2022-11-09 NOTE — HISTORY OF PRESENT ILLNESS
[FreeTextEntry8] : Kelsey presents today with concern of R ankle pain. She is her with her daughter. She reports R ankle pain started 1 day ago. She did go for a walk yesterday and does not recall specific trauma. She says its possible she twisted it but does not specifically recall injury.R ankle was painful and swollen. No other systemic symptoms like f/c, URI or GI symptoms, or rash. She has no tried anything for it. No CP, palpitations, or SOB.  [Family Member] : family member

## 2022-11-09 NOTE — ASSESSMENT
[FreeTextEntry1] : 1. R ankle pain\par Most likely is gout. Traumatic injury is on differential but history does not fully support this\par Less likely DVT given swelling only over joint and on AC for pAF\par Less likely infectious, given well appearing, AF, and full ROM\par Start prednisone 20mg qd x7 days\par Return 1 week\par ortho eval for possible aspiration/eval

## 2022-11-09 NOTE — PHYSICAL EXAM
[No Acute Distress] : no acute distress [Well Nourished] : well nourished [Well Developed] : well developed [Thyroid Normal, No Nodules] : the thyroid was normal and there were no nodules present [No Respiratory Distress] : no respiratory distress  [No Accessory Muscle Use] : no accessory muscle use [Clear to Auscultation] : lungs were clear to auscultation bilaterally [Normal Rate] : normal rate  [Regular Rhythm] : with a regular rhythm [Normal S1, S2] : normal S1 and S2 [No Murmur] : no murmur heard [No Edema] : there was no peripheral edema [de-identified] : no calf asymmetry, erythema, or tenderness. [de-identified] : R ankle is swollen and warm. full ROM. TTP. over lateral ankle.

## 2022-11-11 ENCOUNTER — APPOINTMENT (OUTPATIENT)
Dept: ORTHOPEDIC SURGERY | Facility: CLINIC | Age: 87
End: 2022-11-11

## 2022-11-11 ENCOUNTER — NON-APPOINTMENT (OUTPATIENT)
Age: 87
End: 2022-11-11

## 2022-11-11 VITALS
DIASTOLIC BLOOD PRESSURE: 83 MMHG | OXYGEN SATURATION: 96 % | WEIGHT: 136 LBS | SYSTOLIC BLOOD PRESSURE: 172 MMHG | BODY MASS INDEX: 23.22 KG/M2 | HEART RATE: 69 BPM | HEIGHT: 64 IN

## 2022-11-11 DIAGNOSIS — R58 HEMORRHAGE, NOT ELSEWHERE CLASSIFIED: ICD-10-CM

## 2022-11-11 DIAGNOSIS — M21.6X1 OTHER ACQUIRED DEFORMITIES OF RIGHT FOOT: ICD-10-CM

## 2022-11-11 DIAGNOSIS — M25.571 PAIN IN RIGHT ANKLE AND JOINTS OF RIGHT FOOT: ICD-10-CM

## 2022-11-11 PROCEDURE — 99203 OFFICE O/P NEW LOW 30 MIN: CPT

## 2022-11-11 PROCEDURE — 73610 X-RAY EXAM OF ANKLE: CPT | Mod: RT

## 2022-11-12 PROBLEM — R58 ECCHYMOSIS ON EXAMINATION: Status: ACTIVE | Noted: 2022-11-12

## 2022-11-12 PROBLEM — M21.6X1 GASTROCNEMIUS EQUINUS OF RIGHT LOWER EXTREMITY: Status: ACTIVE | Noted: 2022-11-12

## 2022-11-15 ENCOUNTER — APPOINTMENT (OUTPATIENT)
Dept: ORTHOPEDIC SURGERY | Facility: CLINIC | Age: 87
End: 2022-11-15

## 2022-11-15 ENCOUNTER — APPOINTMENT (OUTPATIENT)
Dept: INTERNAL MEDICINE | Facility: CLINIC | Age: 87
End: 2022-11-15

## 2022-11-22 ENCOUNTER — RX RENEWAL (OUTPATIENT)
Age: 87
End: 2022-11-22

## 2022-12-05 ENCOUNTER — NON-APPOINTMENT (OUTPATIENT)
Age: 87
End: 2022-12-05

## 2022-12-09 ENCOUNTER — APPOINTMENT (OUTPATIENT)
Dept: ORTHOPEDIC SURGERY | Facility: CLINIC | Age: 87
End: 2022-12-09

## 2022-12-09 VITALS — HEIGHT: 63 IN | WEIGHT: 136 LBS | BODY MASS INDEX: 24.1 KG/M2

## 2022-12-09 DIAGNOSIS — M24.9 JOINT DERANGEMENT, UNSPECIFIED: ICD-10-CM

## 2022-12-09 PROCEDURE — 99213 OFFICE O/P EST LOW 20 MIN: CPT

## 2022-12-10 PROBLEM — M24.9 DERANGEMENT OF ANKLE, RIGHT: Status: ACTIVE | Noted: 2022-11-12

## 2023-01-03 ENCOUNTER — LABORATORY RESULT (OUTPATIENT)
Age: 88
End: 2023-01-03

## 2023-01-03 ENCOUNTER — APPOINTMENT (OUTPATIENT)
Dept: INTERNAL MEDICINE | Facility: CLINIC | Age: 88
End: 2023-01-03
Payer: MEDICARE

## 2023-01-03 VITALS
DIASTOLIC BLOOD PRESSURE: 91 MMHG | HEART RATE: 74 BPM | WEIGHT: 135 LBS | SYSTOLIC BLOOD PRESSURE: 158 MMHG | BODY MASS INDEX: 23.91 KG/M2 | OXYGEN SATURATION: 95 %

## 2023-01-03 VITALS — DIASTOLIC BLOOD PRESSURE: 88 MMHG | SYSTOLIC BLOOD PRESSURE: 136 MMHG

## 2023-01-03 PROCEDURE — 36415 COLL VENOUS BLD VENIPUNCTURE: CPT

## 2023-01-03 PROCEDURE — 99213 OFFICE O/P EST LOW 20 MIN: CPT | Mod: 25

## 2023-01-03 RX ORDER — ATORVASTATIN CALCIUM 10 MG/1
10 TABLET, FILM COATED ORAL
Qty: 90 | Refills: 0 | Status: DISCONTINUED | COMMUNITY
Start: 2020-08-12 | End: 2023-01-03

## 2023-01-03 RX ORDER — PREDNISONE 20 MG/1
20 TABLET ORAL DAILY
Qty: 7 | Refills: 0 | Status: DISCONTINUED | COMMUNITY
Start: 2022-11-08 | End: 2023-01-03

## 2023-01-03 RX ORDER — ATENOLOL 50 MG/1
50 TABLET ORAL
Qty: 90 | Refills: 0 | Status: DISCONTINUED | COMMUNITY
Start: 2020-08-12 | End: 2023-01-03

## 2023-01-03 RX ORDER — AMLODIPINE BESYLATE 5 MG/1
5 TABLET ORAL
Qty: 90 | Refills: 0 | Status: DISCONTINUED | COMMUNITY
Start: 2018-12-05 | End: 2023-01-03

## 2023-01-03 NOTE — REVIEW OF SYSTEMS
[Fever] : no fever [Chills] : no chills [Fatigue] : no fatigue [Night Sweats] : no night sweats [Chest Pain] : no chest pain [Palpitations] : no palpitations [Shortness Of Breath] : no shortness of breath [Wheezing] : no wheezing [Cough] : no cough [Abdominal Pain] : no abdominal pain [Headache] : no headache [Dizziness] : no dizziness

## 2023-01-03 NOTE — HISTORY OF PRESENT ILLNESS
[FreeTextEntry1] : HTN, HLD [de-identified] : Kelsey is here for follow-up.  She feels well.  Her ankle pain is better.  She otherwise has no acute concerns.

## 2023-01-03 NOTE — ASSESSMENT
[FreeTextEntry1] : 1. Hypertension\par Blood pressure is well controlled on manual recheck today.  She had been high at her orthopedic appointments, that was normal on her recheck.  She did have a syncopal episode, so her blood pressure today is acceptable.\par \par 2.  Hyperlipidemia\par \par Check labs\par F/U 3 months

## 2023-01-11 LAB
ALBUMIN SERPL ELPH-MCNC: 4.3 G/DL
ALP BLD-CCNC: 84 U/L
ALT SERPL-CCNC: 14 U/L
ANION GAP SERPL CALC-SCNC: 14 MMOL/L
AST SERPL-CCNC: 21 U/L
BASOPHILS # BLD AUTO: 0.02 K/UL
BASOPHILS NFR BLD AUTO: 0.3 %
BILIRUB SERPL-MCNC: 0.5 MG/DL
BUN SERPL-MCNC: 15 MG/DL
CALCIUM SERPL-MCNC: 9.4 MG/DL
CHLORIDE SERPL-SCNC: 101 MMOL/L
CHOLEST SERPL-MCNC: 135 MG/DL
CO2 SERPL-SCNC: 25 MMOL/L
CREAT SERPL-MCNC: 0.78 MG/DL
EGFR: 74 ML/MIN/1.73M2
EOSINOPHIL # BLD AUTO: 0.06 K/UL
EOSINOPHIL NFR BLD AUTO: 1 %
ESTIMATED AVERAGE GLUCOSE: 146 MG/DL
GLUCOSE SERPL-MCNC: 156 MG/DL
HBA1C MFR BLD HPLC: 6.7 %
HCT VFR BLD CALC: 38.8 %
HDLC SERPL-MCNC: 55 MG/DL
HGB BLD-MCNC: 12.1 G/DL
IMM GRANULOCYTES NFR BLD AUTO: 0.7 %
LDLC SERPL CALC-MCNC: 66 MG/DL
LYMPHOCYTES # BLD AUTO: 1.24 K/UL
LYMPHOCYTES NFR BLD AUTO: 20.7 %
MAN DIFF?: NORMAL
MCHC RBC-ENTMCNC: 28.1 PG
MCHC RBC-ENTMCNC: 31.2 GM/DL
MCV RBC AUTO: 90 FL
MONOCYTES # BLD AUTO: 1.67 K/UL
MONOCYTES NFR BLD AUTO: 27.9 %
NEUTROPHILS # BLD AUTO: 2.95 K/UL
NEUTROPHILS NFR BLD AUTO: 49.4 %
NONHDLC SERPL-MCNC: 80 MG/DL
PLATELET # BLD AUTO: 164 K/UL
POTASSIUM SERPL-SCNC: 4 MMOL/L
PROT SERPL-MCNC: 7 G/DL
RBC # BLD: 4.31 M/UL
RBC # FLD: 14.1 %
SODIUM SERPL-SCNC: 139 MMOL/L
TRIGL SERPL-MCNC: 68 MG/DL
WBC # FLD AUTO: 5.98 K/UL

## 2023-01-19 ENCOUNTER — RX RENEWAL (OUTPATIENT)
Age: 88
End: 2023-01-19

## 2023-01-22 ENCOUNTER — RX RENEWAL (OUTPATIENT)
Age: 88
End: 2023-01-22

## 2023-01-25 ENCOUNTER — NON-APPOINTMENT (OUTPATIENT)
Age: 88
End: 2023-01-25

## 2023-01-25 ENCOUNTER — APPOINTMENT (OUTPATIENT)
Dept: CARDIOLOGY | Facility: CLINIC | Age: 88
End: 2023-01-25
Payer: MEDICARE

## 2023-01-25 VITALS
OXYGEN SATURATION: 96 % | BODY MASS INDEX: 23.92 KG/M2 | DIASTOLIC BLOOD PRESSURE: 75 MMHG | HEART RATE: 55 BPM | WEIGHT: 135 LBS | HEIGHT: 63 IN | SYSTOLIC BLOOD PRESSURE: 156 MMHG

## 2023-01-25 VITALS — SYSTOLIC BLOOD PRESSURE: 132 MMHG | DIASTOLIC BLOOD PRESSURE: 72 MMHG

## 2023-01-25 PROCEDURE — 93000 ELECTROCARDIOGRAM COMPLETE: CPT

## 2023-01-25 PROCEDURE — 99214 OFFICE O/P EST MOD 30 MIN: CPT

## 2023-01-25 NOTE — DISCUSSION/SUMMARY
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 There were multiple areas in the brain felt to possibly be due to emboli.  A 2 week event monitor did demonstrate self-limited runs of an irregular appearing narrow complex tachycardia, the longest lasting 24 seconds in duration, suggestive of possible paroxysmal atrial fibrillation. Because of this, she will remain on anticoagulation. Her pharm stress test was unremarkable in 2021.\par \par Kelsey has had episodes of syncope while at [employer], with typical prodromes, to suggest a vagal etiology. She is no longer orthostatic. She will remain off Aldactone and will increase her water intake.  She needs to be careful with standing. She is reporting some swelling at the end of the day, and I have encouraged her to use compression stockings.\par \par She will remain on her current medications, including her antihypertensive regimen.\par \par If no issues, I will see her again in 3-6 months.\par  [EKG obtained to assist in diagnosis and management of assessed problem(s)] : EKG obtained to assist in diagnosis and management of assessed problem(s)

## 2023-01-25 NOTE — HISTORY OF PRESENT ILLNESS
[FreeTextEntry1] : Kelsey is an 87 year old female with a history of hypertension who presented to the emergency room at [facility] 9/12/22 with a chief complaint of a post syncopal episode that occurred while she was in [employer].  The patient does have a history of a CVA and is on Eliquis.  She also has a history of a left breast lumpectomy, and hyperlipidemia. \par \par In 4/2020, she presented to the hospital with symptoms of confusion and disorientation. 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 CT angiogram and MRA of the neck were negative. TTE showed an ejection fraction of 65% with LVH. There was left atrial enlargement with calcification of aortic valve leaflets. The study was felt to be poor in quality. She was discharged on Eliquis and Plavix. Blood work in the hospital was unremarkable. Cholesterol 161, triglycerides 74, HDL 47, and . She has made a good recovery.\par In 2020, she had a 2 week event monitor that demonstrated self-limited runs of an irregular appearing narrow complex tachycardia, the longest lasting 24 seconds in duration, suggestive of possible paroxysmal atrial fibrillation. A pharmacologic stress test in 2021 was unremarkable. \par \par She was last seen by Caridad in 9/22 as preoperative evaluation prior to cataracts.\par \par She feels well today.  She has no chest pain or dyspnea. She reports no abnormal bleeding.\par She had an episode of syncope while at [employer] on 1/16/22. The episode was preceded by a typical prodrome, suggestive of a vagal etiology. She went to the ER with normal evaluation. Echo with normal LV function in 11/21. \par She was orthostatic at last visit, and her aldactone was stopped.\par \par Her past medical history notable for hypertension, and breast cancer status post radiation. She had been treated with Herceptin.\par

## 2023-02-07 NOTE — ED ADULT NURSE NOTE - ED CARDIAC CAPILLARY REFILL
2 seconds or less Cephalexin Pregnancy And Lactation Text: This medication is Pregnancy Category B and considered safe during pregnancy.  It is also excreted in breast milk but can be used safely for shorter doses.

## 2023-02-22 ENCOUNTER — RX RENEWAL (OUTPATIENT)
Age: 88
End: 2023-02-22

## 2023-03-09 ENCOUNTER — APPOINTMENT (OUTPATIENT)
Dept: INTERNAL MEDICINE | Facility: CLINIC | Age: 88
End: 2023-03-09
Payer: MEDICARE

## 2023-03-09 VITALS
OXYGEN SATURATION: 95 % | WEIGHT: 137 LBS | TEMPERATURE: 98.4 F | DIASTOLIC BLOOD PRESSURE: 82 MMHG | SYSTOLIC BLOOD PRESSURE: 129 MMHG | HEART RATE: 70 BPM | BODY MASS INDEX: 24.27 KG/M2

## 2023-03-09 DIAGNOSIS — I48.0 PAROXYSMAL ATRIAL FIBRILLATION: ICD-10-CM

## 2023-03-09 PROCEDURE — 99213 OFFICE O/P EST LOW 20 MIN: CPT | Mod: 25

## 2023-03-20 NOTE — PHYSICAL THERAPY INITIAL EVALUATION ADULT - HEALTH SCREEN CRITERIA
New ER or hospital visits? Yes admission 23 - atherosclerosis of native artery of RLE  Any new or changes in medicines? Yes started on anticoagulation  Previous inhalers? Anoro  Using inhalers? Trelegy and as needed albuterol  Are they helpful? Yes  Any recent exacerbations? No  Last 6 MWT: 2020 - Did not qualify for supplemental O2   Last PFT: 22  Last A1AT: MM     Smoking History:  She is an active smoker of 1 PPD with 35 year history  She reports that she is on medical marijuana oil and she also still does smoke marijuana - reports both are medical. She reports that she is vaping marijuana as well too now  History of daily non-medical marijuana use  She reports she cannot afford edibles. Social History:  Patient job history: Not working, previously worked at [employer] as a . She currently is training at [email or web address] as a  - 15 hours per week (works [employer] for 5 hours each day)  She has not had exposure to aerosolized particles or hazardous fumes. (Coal, dust, asbestos, molds ie Hay)  Denies living on a farm that primarily raised crops, livestock or combination  Denies exposure to pets/animals at home. Denies exposure to tuberculosis. Denies history of urinary retention  Father had emphysema and  of lung cancer.      Flu vaccine: Has not received - is not interested  Pneumonia vaccine: Has not received - is not interested  COVID-19 vaccine: Has not received - is not interested  Past Medical hx   PMH:  Past Medical History:   Diagnosis Date    Anxiety     Arthritis     Carpal tunnel syndrome     Cellulitis     Chronic back pain     Depression     Diabetes mellitus ([address]     Emphysema of lung ([address]     Fibromyalgia     GERD (gastroesophageal reflux disease)     Glaucoma     Hiatal hernia     esophagus closes    Hx of blood clots     foot post op    Hyperlipidemia, mixed 2017    IBS (irritable bowel syndrome)     Leg pain     nerve damage yes

## 2023-03-26 ENCOUNTER — RX RENEWAL (OUTPATIENT)
Age: 88
End: 2023-03-26

## 2023-04-03 ENCOUNTER — LABORATORY RESULT (OUTPATIENT)
Age: 88
End: 2023-04-03

## 2023-04-03 ENCOUNTER — APPOINTMENT (OUTPATIENT)
Dept: INTERNAL MEDICINE | Facility: CLINIC | Age: 88
End: 2023-04-03
Payer: MEDICARE

## 2023-04-03 VITALS
SYSTOLIC BLOOD PRESSURE: 183 MMHG | WEIGHT: 134 LBS | HEART RATE: 70 BPM | DIASTOLIC BLOOD PRESSURE: 94 MMHG | OXYGEN SATURATION: 97 % | BODY MASS INDEX: 23.74 KG/M2 | RESPIRATION RATE: 12 BRPM | HEIGHT: 63 IN

## 2023-04-03 VITALS — DIASTOLIC BLOOD PRESSURE: 82 MMHG | SYSTOLIC BLOOD PRESSURE: 140 MMHG

## 2023-04-03 PROCEDURE — 36415 COLL VENOUS BLD VENIPUNCTURE: CPT

## 2023-04-03 PROCEDURE — 99214 OFFICE O/P EST MOD 30 MIN: CPT | Mod: 25

## 2023-04-03 NOTE — ASSESSMENT
[FreeTextEntry1] : 1. Hypertension\par Blood pressure was high on initial reading, though she had just walked here from her home.\par BP at the conclusion of the visit was 140/82.  Given that she has had a history of syncopal events, 140/82 is acceptable for her at her age\par She will continue amlodipine 5 mg daily, atenolol 100 mg daily, and lisinopril 40 mg daily\par \par 2.  Hyperlipidemia\par Check lipid profile\par Continue atorvastatin 10 mg\par \par 3.  Type 2 diabetes\par Check hemoglobin A1c\par \par 4.  Thyroid nodule\par Followed by Colleen Pepper for darricko in September\par \par Follow-up in 1 to 2 months for BP check and CPE

## 2023-04-03 NOTE — HISTORY OF PRESENT ILLNESS
[FreeTextEntry1] : Hypertension, hyperlipidemia, diabetes [de-identified] : Kelsey presents today for follow-up.  She has been feeling well.  She did have 3 losses in her family in the month of March which she was appropriately upset about.  She is coping well.  She walked here from her house this morning.  She has no other acute concerns.

## 2023-04-03 NOTE — REVIEW OF SYSTEMS
[Fever] : no fever [Chills] : no chills [Night Sweats] : no night sweats [Chest Pain] : no chest pain [Palpitations] : no palpitations [Shortness Of Breath] : no shortness of breath [Wheezing] : no wheezing [Cough] : no cough [Abdominal Pain] : no abdominal pain

## 2023-04-12 LAB
ALBUMIN SERPL ELPH-MCNC: 4.5 G/DL
ALP BLD-CCNC: 88 U/L
ALT SERPL-CCNC: 12 U/L
ANION GAP SERPL CALC-SCNC: 13 MMOL/L
AST SERPL-CCNC: 21 U/L
BASOPHILS # BLD AUTO: 0.03 K/UL
BASOPHILS NFR BLD AUTO: 0.4 %
BILIRUB SERPL-MCNC: 0.6 MG/DL
BUN SERPL-MCNC: 18 MG/DL
CALCIUM SERPL-MCNC: 9.2 MG/DL
CHLORIDE SERPL-SCNC: 101 MMOL/L
CHOLEST SERPL-MCNC: 137 MG/DL
CO2 SERPL-SCNC: 25 MMOL/L
CREAT SERPL-MCNC: 0.75 MG/DL
EGFR: 77 ML/MIN/1.73M2
EOSINOPHIL # BLD AUTO: 0.02 K/UL
EOSINOPHIL NFR BLD AUTO: 0.3 %
ESTIMATED AVERAGE GLUCOSE: 146 MG/DL
GLUCOSE SERPL-MCNC: 103 MG/DL
HBA1C MFR BLD HPLC: 6.7 %
HCT VFR BLD CALC: 40.4 %
HDLC SERPL-MCNC: 56 MG/DL
HGB BLD-MCNC: 12.5 G/DL
IMM GRANULOCYTES NFR BLD AUTO: 0.4 %
LDLC SERPL CALC-MCNC: 63 MG/DL
LYMPHOCYTES # BLD AUTO: 1.63 K/UL
LYMPHOCYTES NFR BLD AUTO: 22.4 %
MAN DIFF?: NORMAL
MCHC RBC-ENTMCNC: 28.2 PG
MCHC RBC-ENTMCNC: 30.9 GM/DL
MCV RBC AUTO: 91.2 FL
MONOCYTES # BLD AUTO: 2.59 K/UL
MONOCYTES NFR BLD AUTO: 35.6 %
NEUTROPHILS # BLD AUTO: 2.98 K/UL
NEUTROPHILS NFR BLD AUTO: 40.9 %
NONHDLC SERPL-MCNC: 81 MG/DL
PLATELET # BLD AUTO: 135 K/UL
POTASSIUM SERPL-SCNC: 4.1 MMOL/L
PROT SERPL-MCNC: 7 G/DL
RBC # BLD: 4.43 M/UL
RBC # FLD: 14.2 %
SODIUM SERPL-SCNC: 139 MMOL/L
T4 FREE SERPL-MCNC: 1.3 NG/DL
TRIGL SERPL-MCNC: 87 MG/DL
TSH SERPL-ACNC: 1.15 UIU/ML
WBC # FLD AUTO: 7.28 K/UL

## 2023-04-23 ENCOUNTER — RX RENEWAL (OUTPATIENT)
Age: 88
End: 2023-04-23

## 2023-04-24 ENCOUNTER — RX RENEWAL (OUTPATIENT)
Age: 88
End: 2023-04-24

## 2023-04-30 ENCOUNTER — RX RENEWAL (OUTPATIENT)
Age: 88
End: 2023-04-30

## 2023-05-01 ENCOUNTER — RX RENEWAL (OUTPATIENT)
Age: 88
End: 2023-05-01

## 2023-05-02 NOTE — ED PROVIDER NOTE - GASTROINTESTINAL, MLM
as per mom pt "broke wrist yesterday" came here and given soft wrist brace.  pt was trying to get up from cough and pushed down on her left wrist and now is having worse pain.  motrin given at 1100.  pt awake and alert, +ROM and pulses.  lungs clear, cap refill less than 2 seconds.  no pmhx no known allergies.
Abdomen soft, non-tender, no guarding.

## 2023-05-11 NOTE — ED ADULT NURSE NOTE - NS PRO PASSIVE SMOKE EXP
With nasal and chest congestion-congested cough  Add guaifenesin ATC and PRN to help expectorate any mucous  Add daily flonase for four days  Continue to monitor vital signs and clinical appearance  If status worsens, would speak to hospice to discuss/coordinate further workup/management/care No

## 2023-05-22 ENCOUNTER — NON-APPOINTMENT (OUTPATIENT)
Age: 88
End: 2023-05-22

## 2023-05-22 ENCOUNTER — APPOINTMENT (OUTPATIENT)
Dept: CARDIOLOGY | Facility: CLINIC | Age: 88
End: 2023-05-22
Payer: MEDICARE

## 2023-05-22 VITALS
HEART RATE: 61 BPM | DIASTOLIC BLOOD PRESSURE: 78 MMHG | BODY MASS INDEX: 24.45 KG/M2 | OXYGEN SATURATION: 97 % | HEIGHT: 63 IN | SYSTOLIC BLOOD PRESSURE: 181 MMHG | WEIGHT: 138 LBS

## 2023-05-22 VITALS — DIASTOLIC BLOOD PRESSURE: 78 MMHG | SYSTOLIC BLOOD PRESSURE: 158 MMHG

## 2023-05-22 PROCEDURE — 99214 OFFICE O/P EST MOD 30 MIN: CPT

## 2023-05-22 PROCEDURE — 93000 ELECTROCARDIOGRAM COMPLETE: CPT

## 2023-05-22 NOTE — HISTORY OF PRESENT ILLNESS
[FreeTextEntry1] : Kelsey is an 88 year old female with a history of hypertension who presented to the emergency room at [facility] 9/12/22 with a chief complaint of a post syncopal episode that occurred while she was in [employer].  The patient does have a history of a CVA and is on Eliquis.  She also has a history of a left breast lumpectomy, and hyperlipidemia. \par \par In 4/2020, she presented to the hospital with symptoms of confusion and disorientation. 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 CT angiogram and MRA of the neck were negative. TTE showed an ejection fraction of 65% with LVH. There was left atrial enlargement with calcification of aortic valve leaflets. The study was felt to be poor in quality. She was discharged on Eliquis and Plavix. Blood work in the hospital was unremarkable. Cholesterol 161, triglycerides 74, HDL 47, and . She has made a good recovery.\par In 2020, she had a 2 week event monitor that demonstrated self-limited runs of an irregular appearing narrow complex tachycardia, the longest lasting 24 seconds in duration, suggestive of possible paroxysmal atrial fibrillation. A pharmacologic stress test in 2021 was unremarkable. \par \par I last saw her in 1/25/23.\par \par She feels well today.  She has no chest pain or dyspnea. She reports no abnormal bleeding.\par She had an episode of syncope while at [employer] on 1/16/22. The episode was preceded by a typical prodrome, suggestive of a vagal etiology. She went to the ER with normal evaluation. Echo with normal LV function in 11/21. \par She was orthostatic in recent past and her aldactone was stopped.\par \par Her past medical history notable for hypertension, and breast cancer status post radiation. She had been treated with Herceptin.\par

## 2023-05-22 NOTE — DISCUSSION/SUMMARY
[___ Month(s)] : in [unfilled] month(s) [FreeTextEntry1] : Kelsey had a CVA in 4/2020. There were multiple areas in the brain felt to possibly be due to emboli.  A 2 week event monitor did demonstrate self-limited runs of an irregular appearing narrow complex tachycardia, the longest lasting 24 seconds in duration, suggestive of possible paroxysmal atrial fibrillation. Because of this, she will remain on anticoagulation. Her pharm stress test was unremarkable in 2021.\par \par Kelsey has had episodes of syncope while at [employer], with typical prodromes, to suggest a vagal etiology. She is no longer orthostatic. She will remain off Aldactone and will increase her water intake.  She needs to be careful with standing. She is reporting some swelling at the end of the day, and I have encouraged her to use compression stockings.\par \par She will remain on her current medications, including her antihypertensive regimen. Her BP is a little elevated, and she will begin checking her numbers at home. If they remain elevated, I will increase her amlodipine. \par \par If no issues, I will see her again in 3-6 months.\par  [EKG obtained to assist in diagnosis and management of assessed problem(s)] : EKG obtained to assist in diagnosis and management of assessed problem(s)

## 2023-05-23 ENCOUNTER — RX RENEWAL (OUTPATIENT)
Age: 88
End: 2023-05-23

## 2023-06-13 ENCOUNTER — LABORATORY RESULT (OUTPATIENT)
Age: 88
End: 2023-06-13

## 2023-06-13 ENCOUNTER — APPOINTMENT (OUTPATIENT)
Dept: INTERNAL MEDICINE | Facility: CLINIC | Age: 88
End: 2023-06-13
Payer: MEDICARE

## 2023-06-13 VITALS — SYSTOLIC BLOOD PRESSURE: 150 MMHG | DIASTOLIC BLOOD PRESSURE: 84 MMHG

## 2023-06-13 VITALS
WEIGHT: 136 LBS | SYSTOLIC BLOOD PRESSURE: 156 MMHG | DIASTOLIC BLOOD PRESSURE: 96 MMHG | BODY MASS INDEX: 24.1 KG/M2 | RESPIRATION RATE: 12 BRPM | HEIGHT: 63 IN | OXYGEN SATURATION: 97 % | HEART RATE: 62 BPM

## 2023-06-13 DIAGNOSIS — F41.9 ANXIETY DISORDER, UNSPECIFIED: ICD-10-CM

## 2023-06-13 DIAGNOSIS — Z00.00 ENCOUNTER FOR GENERAL ADULT MEDICAL EXAMINATION W/OUT ABNORMAL FINDINGS: ICD-10-CM

## 2023-06-13 PROCEDURE — G0439: CPT

## 2023-06-13 PROCEDURE — 36415 COLL VENOUS BLD VENIPUNCTURE: CPT

## 2023-06-13 PROCEDURE — G0444 DEPRESSION SCREEN ANNUAL: CPT | Mod: 59

## 2023-06-13 PROCEDURE — 93000 ELECTROCARDIOGRAM COMPLETE: CPT | Mod: 59

## 2023-06-13 PROCEDURE — 99213 OFFICE O/P EST LOW 20 MIN: CPT | Mod: 25

## 2023-06-13 NOTE — HEALTH RISK ASSESSMENT
[No] : No [0] : 1) Little interest or pleasure doing things: Not at all (0) [2] : 2) Feeling down, depressed, or hopeless for more than half of the days (2) [PHQ-2 Positive] : PHQ-2 Positive [Patient reported mammogram was normal] : Patient reported mammogram was normal [Never] : Never [[id number]] : 2 [MammogramComments] : Reports normal in February.  We will request records. [BoneDensityComments] : Reports it was done last year.  We will get records

## 2023-06-13 NOTE — REVIEW OF SYSTEMS
[Anxiety] : anxiety [Depression] : depression [Fever] : no fever [Chills] : no chills [Fatigue] : no fatigue [Night Sweats] : no night sweats [Vision Problems] : no vision problems [Nasal Discharge] : no nasal discharge [Sore Throat] : no sore throat [Chest Pain] : no chest pain [Palpitations] : no palpitations [Shortness Of Breath] : no shortness of breath [Wheezing] : no wheezing [Cough] : no cough [Abdominal Pain] : no abdominal pain [Nausea] : no nausea [Constipation] : no constipation [Diarrhea] : diarrhea [Vomiting] : no vomiting [Dysuria] : no dysuria [Hematuria] : no hematuria [Headache] : no headache [Dizziness] : no dizziness

## 2023-06-13 NOTE — HISTORY OF PRESENT ILLNESS
[FreeTextEntry1] : CPE, hypertension, ringing in the ear [de-identified] : Kelsey presents today for CPE.  Reports some intermittent ringing in ears.  It is hard to tell what year it is coming from per her report.  Denies a sensation of flushing.  Ringing will come and go without clear inciting trigger.  No ear pain.  Her blood pressure was also mildly high when she saw Dr. Tong.  She is monitoring it at home and it was normal.  Does not have a list of readings with her fortunately.  No other acute concerns.

## 2023-06-13 NOTE — ASSESSMENT
[FreeTextEntry1] : CPE\par CBC, CMP, A1c, lipid, UA, stool FIT\par EKG is unchanged from prior\par We will request mammo and bone density results\par \par 1. Hypertension\par Blood pressure is elevated today, though reports home readings have been normal\par Will call back in 1 week with BP readings\par She will continue amlodipine 5 mg daily, atenolol 100 mg daily, and lisinopril 40 mg daily\par \par 2.  Hyperlipidemia\par Check lipid profile\par Continue atorvastatin 10 mg\par \par 3.  Type 2 diabetes\par Check hemoglobin A1c, urine alb/cr\par \par 4.  Thyroid nodule\par Followed by Endo\par \par 5.  Positive PHQ\par Grieving loss of 2 relatives.\par Using as needed alprazolam for anxiety with good effect\par We will continue to monitor\par \par Follow-up 3 months

## 2023-06-20 ENCOUNTER — NON-APPOINTMENT (OUTPATIENT)
Age: 88
End: 2023-06-20

## 2023-06-20 LAB
ALBUMIN SERPL ELPH-MCNC: 4.4 G/DL
ALP BLD-CCNC: 92 U/L
ALT SERPL-CCNC: 11 U/L
ANION GAP SERPL CALC-SCNC: 14 MMOL/L
AST SERPL-CCNC: 21 U/L
BILIRUB SERPL-MCNC: 0.7 MG/DL
BUN SERPL-MCNC: 15 MG/DL
CALCIUM SERPL-MCNC: 9.2 MG/DL
CHLORIDE SERPL-SCNC: 104 MMOL/L
CHOLEST SERPL-MCNC: 142 MG/DL
CO2 SERPL-SCNC: 24 MMOL/L
CREAT SERPL-MCNC: 0.79 MG/DL
EGFR: 72 ML/MIN/1.73M2
ESTIMATED AVERAGE GLUCOSE: 140 MG/DL
GLUCOSE SERPL-MCNC: 94 MG/DL
HBA1C MFR BLD HPLC: 6.5 %
HDLC SERPL-MCNC: 55 MG/DL
LDLC SERPL CALC-MCNC: 70 MG/DL
NONHDLC SERPL-MCNC: 87 MG/DL
POTASSIUM SERPL-SCNC: 4.2 MMOL/L
PROT SERPL-MCNC: 7 G/DL
SODIUM SERPL-SCNC: 142 MMOL/L
T4 FREE SERPL-MCNC: 1.3 NG/DL
TRIGL SERPL-MCNC: 86 MG/DL
TSH SERPL-ACNC: 0.83 UIU/ML

## 2023-06-21 ENCOUNTER — LABORATORY RESULT (OUTPATIENT)
Age: 88
End: 2023-06-21

## 2023-07-02 ENCOUNTER — RX RENEWAL (OUTPATIENT)
Age: 88
End: 2023-07-02

## 2023-07-24 ENCOUNTER — RX RENEWAL (OUTPATIENT)
Age: 88
End: 2023-07-24

## 2023-08-21 ENCOUNTER — RX RENEWAL (OUTPATIENT)
Age: 88
End: 2023-08-21

## 2023-08-25 ENCOUNTER — RX RENEWAL (OUTPATIENT)
Age: 88
End: 2023-08-25

## 2023-09-09 ENCOUNTER — EMERGENCY (EMERGENCY)
Facility: HOSPITAL | Age: 88
LOS: 1 days | Discharge: HOME | End: 2023-09-09
Attending: EMERGENCY MEDICINE | Admitting: EMERGENCY MEDICINE
Payer: MEDICARE

## 2023-09-09 VITALS
SYSTOLIC BLOOD PRESSURE: 188 MMHG | TEMPERATURE: 98 F | HEIGHT: 62 IN | DIASTOLIC BLOOD PRESSURE: 84 MMHG | HEART RATE: 71 BPM | WEIGHT: 134.92 LBS | RESPIRATION RATE: 16 BRPM | OXYGEN SATURATION: 99 %

## 2023-09-09 DIAGNOSIS — Z98.890 OTHER SPECIFIED POSTPROCEDURAL STATES: Chronic | ICD-10-CM

## 2023-09-09 LAB
ALBUMIN SERPL ELPH-MCNC: 3.6 G/DL — SIGNIFICANT CHANGE UP (ref 3.3–5)
ALP SERPL-CCNC: 86 U/L — SIGNIFICANT CHANGE UP (ref 40–120)
ALT FLD-CCNC: 23 U/L — SIGNIFICANT CHANGE UP (ref 12–78)
ANION GAP SERPL CALC-SCNC: 5 MMOL/L — SIGNIFICANT CHANGE UP (ref 5–17)
APTT BLD: 37.6 SEC — HIGH (ref 24.5–35.6)
AST SERPL-CCNC: 32 U/L — SIGNIFICANT CHANGE UP (ref 15–37)
BILIRUB SERPL-MCNC: 0.5 MG/DL — SIGNIFICANT CHANGE UP (ref 0.2–1.2)
BUN SERPL-MCNC: 17 MG/DL — SIGNIFICANT CHANGE UP (ref 7–23)
CALCIUM SERPL-MCNC: 8.9 MG/DL — SIGNIFICANT CHANGE UP (ref 8.5–10.1)
CHLORIDE SERPL-SCNC: 105 MMOL/L — SIGNIFICANT CHANGE UP (ref 96–108)
CO2 SERPL-SCNC: 27 MMOL/L — SIGNIFICANT CHANGE UP (ref 22–31)
CREAT SERPL-MCNC: 0.81 MG/DL — SIGNIFICANT CHANGE UP (ref 0.5–1.3)
EGFR: 70 ML/MIN/1.73M2 — SIGNIFICANT CHANGE UP
GLUCOSE SERPL-MCNC: 105 MG/DL — HIGH (ref 70–99)
HCT VFR BLD CALC: 36.1 % — SIGNIFICANT CHANGE UP (ref 34.5–45)
HGB BLD-MCNC: 12.3 G/DL — SIGNIFICANT CHANGE UP (ref 11.5–15.5)
INR BLD: 1.48 RATIO — HIGH (ref 0.85–1.18)
LIDOCAIN IGE QN: 60 U/L — SIGNIFICANT CHANGE UP (ref 13–75)
MCHC RBC-ENTMCNC: 28.7 PG — SIGNIFICANT CHANGE UP (ref 27–34)
MCHC RBC-ENTMCNC: 34.1 GM/DL — SIGNIFICANT CHANGE UP (ref 32–36)
MCV RBC AUTO: 84.1 FL — SIGNIFICANT CHANGE UP (ref 80–100)
PLATELET # BLD AUTO: 134 K/UL — LOW (ref 150–400)
POTASSIUM SERPL-MCNC: 4.4 MMOL/L — SIGNIFICANT CHANGE UP (ref 3.5–5.3)
POTASSIUM SERPL-SCNC: 4.4 MMOL/L — SIGNIFICANT CHANGE UP (ref 3.5–5.3)
PROT SERPL-MCNC: 7.9 G/DL — SIGNIFICANT CHANGE UP (ref 6–8.3)
PROTHROM AB SERPL-ACNC: 17.1 SEC — HIGH (ref 9.5–13)
RBC # BLD: 4.29 M/UL — SIGNIFICANT CHANGE UP (ref 3.8–5.2)
RBC # FLD: 13.4 % — SIGNIFICANT CHANGE UP (ref 10.3–14.5)
SODIUM SERPL-SCNC: 137 MMOL/L — SIGNIFICANT CHANGE UP (ref 135–145)
WBC # BLD: 8.14 K/UL — SIGNIFICANT CHANGE UP (ref 3.8–10.5)
WBC # FLD AUTO: 8.14 K/UL — SIGNIFICANT CHANGE UP (ref 3.8–10.5)

## 2023-09-09 PROCEDURE — 80053 COMPREHEN METABOLIC PANEL: CPT

## 2023-09-09 PROCEDURE — 86900 BLOOD TYPING SEROLOGIC ABO: CPT

## 2023-09-09 PROCEDURE — 83690 ASSAY OF LIPASE: CPT

## 2023-09-09 PROCEDURE — 85025 COMPLETE CBC W/AUTO DIFF WBC: CPT

## 2023-09-09 PROCEDURE — 87635 SARS-COV-2 COVID-19 AMP PRB: CPT

## 2023-09-09 PROCEDURE — 85610 PROTHROMBIN TIME: CPT

## 2023-09-09 PROCEDURE — 99284 EMERGENCY DEPT VISIT MOD MDM: CPT

## 2023-09-09 PROCEDURE — 99283 EMERGENCY DEPT VISIT LOW MDM: CPT | Mod: 25

## 2023-09-09 PROCEDURE — 36415 COLL VENOUS BLD VENIPUNCTURE: CPT

## 2023-09-09 PROCEDURE — 86901 BLOOD TYPING SEROLOGIC RH(D): CPT

## 2023-09-09 PROCEDURE — 85730 THROMBOPLASTIN TIME PARTIAL: CPT

## 2023-09-09 PROCEDURE — 86850 RBC ANTIBODY SCREEN: CPT

## 2023-09-09 RX ORDER — HYDROCORTISONE 1 %
1 OINTMENT (GRAM) TOPICAL
Qty: 1 | Refills: 0
Start: 2023-09-09 | End: 2023-09-13

## 2023-09-09 NOTE — ED PROVIDER NOTE - CLINICAL SUMMARY MEDICAL DECISION MAKING FREE TEXT BOX
88-year-old female history of hypertension CVA on Eliquis history of hemorrhoids admits to straining hard recently to have bowel movements complaining of sudden onset of bleeding from the rectum while showering tonight.  Noticed moderate amount of blood that has now since stopped.  Denies any shortness of breath chest pain dizziness. Has had this happen to her in the past where she followed up with GI and was considering to get a colonoscopy for this.    r/o GI bleed, bleeding hemorrhoids, H&H stable 12.3/36.1.  BUN/creatinine normal.  On rectal exam suspicious for bleeding hemorrhoid that is now stable.  Explained patient will follow-up with outpatient GI.  Anusol suppository prescribed.  Understands to return if any worsening symptoms.

## 2023-09-09 NOTE — ED PROVIDER NOTE - OBJECTIVE STATEMENT
88-year-old female history of hypertension CVA on Eliquis history of hemorrhoids admits to straining hard recently to have bowel movements complaining of sudden onset of bleeding from the rectum while showering tonight.  Noticed moderate amount of blood that has now since stopped.  Denies any shortness of breath chest pain dizziness. Has had this happen to her in the past where she followed up with GI and was considering to get a colonoscopy for this.

## 2023-09-09 NOTE — ED PROVIDER NOTE - PHYSICAL EXAMINATION
Gen: Alert, NAD  Head/eyes: NC/AT, PERRL  ENT: airway patent  Neck: supple  Pulm/lung: Bilateral clear BS  CV/heart: RRR  GI/Abd: soft, NT/ND, +BS, no guarding/rebound tenderness, rectal exam: +external and internal hemorrhoids, +1 hemorrhoid 9'o clock position, +ecchymotic likely ruptured hemorrhoid, no active bleeding  Musculoskeletal: no edema/erythema/cyanosis  Skin: no rash  Neuro: AAOx3, grossly intact

## 2023-09-09 NOTE — ED PROVIDER NOTE - CARE PROVIDER_API CALL
Raymundo Drew  Gastroenterology  [address] [place] [address]  Phone: [phone]  Fax: [phone]  Follow Up Time:

## 2023-09-09 NOTE — ED PROVIDER NOTE - PATIENT PORTAL LINK FT
You can access the FollowMyHealth Patient Portal offered by [facility] by registering at the following website: [email or web address]. By joining [employer]’s FollowMyHealth portal, you will also be able to view your health information using other applications (apps) compatible with our system.

## 2023-09-09 NOTE — ED ADULT NURSE NOTE - OBJECTIVE STATEMENT
89 y/o female received ambulatory from triage. Alert and oriented x4. C/o blood stool. Reports "there was a few spots on my underwear." Pt denies any cp, sob, n/v/d, dizziness, headache, fever/chills at this time. Respirations even and unlabored.

## 2023-09-09 NOTE — ED ADULT NURSE NOTE - NSFALLUNIVINTERV_ED_ALL_ED
Bed/Stretcher in lowest position, wheels locked, appropriate side rails in place/Call bell, personal items and telephone in reach/Instruct patient to call for assistance before getting out of bed/chair/stretcher/Non-slip footwear applied when patient is off stretcher/[place] to call system/Physically safe environment - no spills, clutter or unnecessary equipment/Purposeful proactive rounding/Room/bathroom lighting operational, light cord in reach

## 2023-09-09 NOTE — ED PROVIDER NOTE - NSFOLLOWUPINSTRUCTIONS_ED_ALL_ED_FT
1) Follow-up with your Gastroenterologist or Dr. Drew. Call today / next business day for prompt follow-up.  2) Return to Emergency room for any worsening or persistent pain, weakness, fever, or any other concerning symptoms.  3) See attached instruction sheets for additional information, including information regarding signs and symptoms to look out for, reasons to seek immediate care and other important instructions.  4) Follow-up with any specialists as discussed / noted as well.    Hemorrhoids are swollen veins in and around the rectum or anus. There are two types of hemorrhoids:  Internal hemorrhoids. These occur in the veins that are just inside the rectum. They may poke through to the outside and become irritated and painful.  External hemorrhoids. These occur in the veins that are outside the anus and can be felt as a painful swelling or hard lump near the anus.  Most hemorrhoids do not cause serious problems, and they can be managed with home treatments such as diet and lifestyle changes. If home treatments do not help the symptoms, procedures can be done to shrink or remove the hemorrhoids.    What are the causes?  This condition is caused by increased pressure in the anal area. This pressure may result from various things, including:  Constipation.  Straining to have a bowel movement.  Diarrhea.  Pregnancy.  Obesity.  Sitting for long periods of time.  Heavy lifting or other activity that causes you to strain.  Anal sex.  Riding a bike for a long period of time.  What are the signs or symptoms?  Symptoms of this condition include:  Pain.  Anal itching or irritation.  Rectal bleeding.  Leakage of stool (feces).  Anal swelling.  One or more lumps around the anus.  How is this diagnosed?  This condition can often be diagnosed through a visual exam. Other exams or tests may also be done, such as:  An exam that involves feeling the rectal area with a gloved hand (digital rectal exam).  An exam of the anal canal that is done using a small tube (anoscope).  A blood test, if you have lost a significant amount of blood.  A test to look inside the colon using a flexible tube with a camera on the end (sigmoidoscopy or colonoscopy).  How is this treated?  This condition can usually be treated at home. However, various procedures may be done if dietary changes, lifestyle changes, and other home treatments do not help your symptoms. These procedures can help make the hemorrhoids smaller or remove them completely. Some of these procedures involve surgery, and others do not. Common procedures include:  Rubber band ligation. Rubber bands are placed at the base of the hemorrhoids to cut off their blood supply.  Sclerotherapy. Medicine is injected into the hemorrhoids to shrink them.  Infrared coagulation. A type of light energy is used to get rid of the hemorrhoids.  Hemorrhoidectomy surgery. The hemorrhoids are surgically removed, and the veins that supply them are tied off.  Stapled hemorrhoidopexy surgery. The surgeon staples the base of the hemorrhoid to the rectal wall.  Follow these instructions at home:  Eating and drinking    A sampling of vegetables and other plant-based foods.  Eat foods that have a lot of fiber in them, such as whole grains, beans, nuts, fruits, and vegetables.  Ask your health care provider about taking products that have added fiber (fiber supplements).  Reduce the amount of fat in your diet. You can do this by eating low-fat dairy products, eating less red meat, and avoiding processed foods.  Drink enough fluid to keep your urine pale yellow.  Managing pain and swelling    A bathtub partially filled with water.  Take warm sitz baths for 20 minutes, 3–4 times a day to ease pain and discomfort. You may do this in a bathtub or using a portable sitz bath that fits over the toilet.  If directed, apply ice to the affected area. Using ice packs between sitz baths may be helpful.  Put ice in a plastic bag.  Place a towel between your skin and the bag.  Leave the ice on for 20 minutes, 2–3 times a day.  General instructions    Take over-the-counter and prescription medicines only as told by your health care provider.  Use medicated creams or suppositories as told.  Get regular exercise. Ask your health care provider how much and what kind of exercise is best for you. In general, you should do moderate exercise for at least 30 minutes on most days of the week (150 minutes each week). This can include activities such as walking, biking, or yoga.  Go to the bathroom when you have the urge to have a bowel movement. Do not wait.  Avoid straining to have bowel movements.  Keep the anal area dry and clean. Use wet toilet paper or moist towelettes after a bowel movement.  Do not sit on the toilet for long periods of time. This increases blood pooling and pain.  Keep all follow-up visits as told by your health care provider. This is important.  Contact a health care provider if you have:  Increasing pain and swelling that are not controlled by treatment or medicine.  Difficulty having a bowel movement, or you are unable to have a bowel movement.  Pain or inflammation outside the area of the hemorrhoids.  Get help right away if you have:  Uncontrolled bleeding from your rectum.  Summary  Hemorrhoids are swollen veins in and around the rectum or anus.  Most hemorrhoids can be managed with home treatments such as diet and lifestyle changes.  Taking warm sitz baths can help ease pain and discomfort.  In severe cases, procedures or surgery can be done to shrink or remove the hemorrhoids.  This information is not intended to replace advice given to you by your health care provider. Make sure you discuss any questions you have with your health care provider.

## 2023-09-10 VITALS
DIASTOLIC BLOOD PRESSURE: 76 MMHG | SYSTOLIC BLOOD PRESSURE: 165 MMHG | RESPIRATION RATE: 18 BRPM | OXYGEN SATURATION: 98 % | HEART RATE: 77 BPM | TEMPERATURE: 98 F

## 2023-09-10 LAB
BASOPHILS # BLD AUTO: 0.02 K/UL — SIGNIFICANT CHANGE UP (ref 0–0.2)
BASOPHILS NFR BLD AUTO: 0.3 % — SIGNIFICANT CHANGE UP (ref 0–2)
EOSINOPHIL # BLD AUTO: 0.07 K/UL — SIGNIFICANT CHANGE UP (ref 0–0.5)
EOSINOPHIL NFR BLD AUTO: 0.9 % — SIGNIFICANT CHANGE UP (ref 0–6)
IMM GRANULOCYTES NFR BLD AUTO: 0.9 % — SIGNIFICANT CHANGE UP (ref 0–0.9)
LYMPHOCYTES # BLD AUTO: 1.85 K/UL — SIGNIFICANT CHANGE UP (ref 1–3.3)
LYMPHOCYTES # BLD AUTO: 23.5 % — SIGNIFICANT CHANGE UP (ref 13–44)
MONOCYTES # BLD AUTO: 3.06 K/UL — HIGH (ref 0–0.9)
MONOCYTES NFR BLD AUTO: 38.8 % — HIGH (ref 2–14)
NEUTROPHILS # BLD AUTO: 2.81 K/UL — SIGNIFICANT CHANGE UP (ref 1.8–7.4)
NEUTROPHILS NFR BLD AUTO: 35.6 % — LOW (ref 43–77)
NRBC # BLD: 0 /100 WBCS — SIGNIFICANT CHANGE UP (ref 0–0)
SARS-COV-2 RNA SPEC QL NAA+PROBE: SIGNIFICANT CHANGE UP

## 2023-09-12 ENCOUNTER — NON-APPOINTMENT (OUTPATIENT)
Age: 88
End: 2023-09-12

## 2023-09-12 ENCOUNTER — APPOINTMENT (OUTPATIENT)
Dept: CARDIOLOGY | Facility: CLINIC | Age: 88
End: 2023-09-12
Payer: MEDICARE

## 2023-09-12 VITALS
SYSTOLIC BLOOD PRESSURE: 136 MMHG | HEIGHT: 63 IN | DIASTOLIC BLOOD PRESSURE: 78 MMHG | OXYGEN SATURATION: 96 % | BODY MASS INDEX: 24.1 KG/M2 | WEIGHT: 136 LBS | HEART RATE: 62 BPM

## 2023-09-12 DIAGNOSIS — K64.9 UNSPECIFIED HEMORRHOIDS: ICD-10-CM

## 2023-09-12 DIAGNOSIS — R94.31 ABNORMAL ELECTROCARDIOGRAM [ECG] [EKG]: ICD-10-CM

## 2023-09-12 PROCEDURE — 99214 OFFICE O/P EST MOD 30 MIN: CPT

## 2023-09-12 PROCEDURE — 93000 ELECTROCARDIOGRAM COMPLETE: CPT

## 2023-09-12 RX ORDER — HYDROCORTISONE ACETATE 25 MG/1
25 SUPPOSITORY RECTAL TWICE DAILY
Qty: 20 | Refills: 0 | Status: ACTIVE | COMMUNITY
Start: 2023-09-12 | End: 1900-01-01

## 2023-09-15 ENCOUNTER — APPOINTMENT (OUTPATIENT)
Dept: CARDIOLOGY | Facility: CLINIC | Age: 88
End: 2023-09-15
Payer: MEDICARE

## 2023-09-15 PROCEDURE — 93306 TTE W/DOPPLER COMPLETE: CPT

## 2023-09-18 NOTE — ED ADULT NURSE NOTE - AS O2 DELIVERY
-- DO NOT REPLY / DO NOT REPLY ALL --  -- Message is from [facility] [employer] ([facility]) --    General Patient Message: Patient currently has a yeast infection and is on medication for it. Today patient is having pain from belly button to hip and lower back. Pain level is 6/7. She has some nausea on an off.           
Patient reports pain that started 2 days ago in right low back area almost to buttocks.  Patient also has pain in her abdomin level with umblillicus to her lateral side.  Pain is described as a constant ache.  Pain is 6/10.  No CP, No SOB, No loose stool, No fever.  Patient is nauseated.  Back pain is more of patient concern for pain.  Patient is urinating normal, yellow and clear.  No blood noted.  No burning with urination. No injuries noted.    After triaging this pt, protocol recommends  Patient be evaluated in the ER as patient is stating her pain is severe and she is over 61.  Patient in a agreement and will go to ER now.     Reason for Disposition  • [1] SEVERE pain AND [2] age > 60 years    Protocols used: ABDOMINAL PAIN - FEMALE-A-      
room air

## 2023-10-10 ENCOUNTER — APPOINTMENT (OUTPATIENT)
Dept: INTERNAL MEDICINE | Facility: CLINIC | Age: 88
End: 2023-10-10
Payer: MEDICARE

## 2023-10-10 ENCOUNTER — LABORATORY RESULT (OUTPATIENT)
Age: 88
End: 2023-10-10

## 2023-10-10 VITALS
BODY MASS INDEX: 23.91 KG/M2 | OXYGEN SATURATION: 95 % | SYSTOLIC BLOOD PRESSURE: 137 MMHG | HEART RATE: 67 BPM | DIASTOLIC BLOOD PRESSURE: 84 MMHG | WEIGHT: 135 LBS

## 2023-10-10 DIAGNOSIS — E04.1 NONTOXIC SINGLE THYROID NODULE: ICD-10-CM

## 2023-10-10 DIAGNOSIS — I10 ESSENTIAL (PRIMARY) HYPERTENSION: ICD-10-CM

## 2023-10-10 PROCEDURE — 36415 COLL VENOUS BLD VENIPUNCTURE: CPT

## 2023-10-10 PROCEDURE — 90662 IIV NO PRSV INCREASED AG IM: CPT

## 2023-10-10 PROCEDURE — G0008: CPT

## 2023-10-10 PROCEDURE — 99214 OFFICE O/P EST MOD 30 MIN: CPT | Mod: 25

## 2023-10-22 ENCOUNTER — RX RENEWAL (OUTPATIENT)
Age: 88
End: 2023-10-22

## 2023-10-26 LAB
ALBUMIN SERPL ELPH-MCNC: 4.3 G/DL
ALP BLD-CCNC: 80 U/L
ALT SERPL-CCNC: 12 U/L
ANION GAP SERPL CALC-SCNC: 12 MMOL/L
AST SERPL-CCNC: 20 U/L
BASOPHILS # BLD AUTO: 0.14 K/UL
BASOPHILS NFR BLD AUTO: 1.7 %
BILIRUB SERPL-MCNC: 0.4 MG/DL
BUN SERPL-MCNC: 15 MG/DL
CALCIUM SERPL-MCNC: 9.2 MG/DL
CHLORIDE SERPL-SCNC: 102 MMOL/L
CHOLEST SERPL-MCNC: 139 MG/DL
CO2 SERPL-SCNC: 26 MMOL/L
CREAT SERPL-MCNC: 0.76 MG/DL
EGFR: 76 ML/MIN/1.73M2
EOSINOPHIL # BLD AUTO: 0.07 K/UL
EOSINOPHIL NFR BLD AUTO: 0.9 %
ESTIMATED AVERAGE GLUCOSE: 146 MG/DL
GLUCOSE SERPL-MCNC: 174 MG/DL
HBA1C MFR BLD HPLC: 6.7 %
HCT VFR BLD CALC: 36.6 %
HDLC SERPL-MCNC: 54 MG/DL
HGB BLD-MCNC: 11.8 G/DL
LDLC SERPL CALC-MCNC: 64 MG/DL
LYMPHOCYTES # BLD AUTO: 2.26 K/UL
LYMPHOCYTES NFR BLD AUTO: 27.2 %
MAN DIFF?: NORMAL
MCHC RBC-ENTMCNC: 28.9 PG
MCHC RBC-ENTMCNC: 32.2 GM/DL
MCV RBC AUTO: 89.5 FL
MONOCYTES # BLD AUTO: 1.9 K/UL
MONOCYTES NFR BLD AUTO: 22.8 %
NEUTROPHILS # BLD AUTO: 3.94 K/UL
NEUTROPHILS NFR BLD AUTO: 47.4 %
NONHDLC SERPL-MCNC: 85 MG/DL
PLATELET # BLD AUTO: 130 K/UL
POTASSIUM SERPL-SCNC: 4.1 MMOL/L
PROT SERPL-MCNC: 6.9 G/DL
RBC # BLD: 4.09 M/UL
RBC # FLD: 14.4 %
SODIUM SERPL-SCNC: 139 MMOL/L
TRIGL SERPL-MCNC: 117 MG/DL
TSH SERPL-ACNC: 0.92 UIU/ML
WBC # FLD AUTO: 8.32 K/UL

## 2023-11-22 NOTE — ED ADULT NURSE NOTE - [facility] ED NUR SYMPTOMS POS

## 2023-12-03 ENCOUNTER — RX RENEWAL (OUTPATIENT)
Age: 88
End: 2023-12-03

## 2023-12-07 ENCOUNTER — NON-APPOINTMENT (OUTPATIENT)
Age: 88
End: 2023-12-07

## 2023-12-08 ENCOUNTER — NON-APPOINTMENT (OUTPATIENT)
Age: 88
End: 2023-12-08

## 2023-12-26 ENCOUNTER — RX RENEWAL (OUTPATIENT)
Age: 88
End: 2023-12-26

## 2024-01-04 ENCOUNTER — RX RENEWAL (OUTPATIENT)
Age: 89
End: 2024-01-04

## 2024-01-11 ENCOUNTER — APPOINTMENT (OUTPATIENT)
Dept: INTERNAL MEDICINE | Facility: CLINIC | Age: 89
End: 2024-01-11
Payer: MEDICARE

## 2024-01-11 VITALS
WEIGHT: 132 LBS | RESPIRATION RATE: 12 BRPM | HEART RATE: 75 BPM | HEIGHT: 63 IN | DIASTOLIC BLOOD PRESSURE: 102 MMHG | BODY MASS INDEX: 23.39 KG/M2 | SYSTOLIC BLOOD PRESSURE: 159 MMHG | OXYGEN SATURATION: 95 %

## 2024-01-11 VITALS — DIASTOLIC BLOOD PRESSURE: 80 MMHG | SYSTOLIC BLOOD PRESSURE: 136 MMHG

## 2024-01-11 DIAGNOSIS — I63.9 CEREBRAL INFARCTION, UNSPECIFIED: ICD-10-CM

## 2024-01-11 DIAGNOSIS — R79.89 OTHER SPECIFIED ABNORMAL FINDINGS OF BLOOD CHEMISTRY: ICD-10-CM

## 2024-01-11 DIAGNOSIS — I48.91 UNSPECIFIED ATRIAL FIBRILLATION: ICD-10-CM

## 2024-01-11 PROCEDURE — 99214 OFFICE O/P EST MOD 30 MIN: CPT

## 2024-01-11 NOTE — HISTORY OF PRESENT ILLNESS
[FreeTextEntry1] : HTN, HLD, T2DM, AF [de-identified] : Kelsey is here today for follow-up.  She has been feeling well overall.  She walked to her appointment today.  No chest pain, palpitation or shortness of breath.  No dizziness or lightheadedness.  She recently saw her hematologist.  She reports she was told that everything has been stable and there are no new significant changes.

## 2024-01-11 NOTE — REVIEW OF SYSTEMS
[Fever] : no fever [Chills] : no chills [Fatigue] : no fatigue [Night Sweats] : no night sweats [Chest Pain] : no chest pain [Palpitations] : no palpitations [Shortness Of Breath] : no shortness of breath [Abdominal Pain] : no abdominal pain [Dizziness] : no dizziness

## 2024-01-11 NOTE — ASSESSMENT
[FreeTextEntry1] : 1.  Hypertension Well-controlled on amlodipine 5 mg, atenolol 100, and lisinopril 40 BP was high upon arrival, though had walked to office.  Her blood pressure is acceptable after she was at rest  2.  Hyperlipidemia Recheck CMP and lipid profile on atorvastatin 10 mg daily  3.  Hemoglobin A1c for diet-controlled type 2 diabetes  4.  Recheck thyroid function test  5.  A-fib  6.  Chronic monocytosis, abnormal peripheral blood smear.  Repeat eval by hematology was normal.  Will continue to monitor.  Follow-up 3-month

## 2024-01-27 ENCOUNTER — RX RENEWAL (OUTPATIENT)
Age: 89
End: 2024-01-27

## 2024-01-29 LAB
ALBUMIN SERPL ELPH-MCNC: 4.4 G/DL
ALP BLD-CCNC: 84 U/L
ALT SERPL-CCNC: 11 U/L
ANION GAP SERPL CALC-SCNC: 13 MMOL/L
AST SERPL-CCNC: 19 U/L
BILIRUB SERPL-MCNC: 0.6 MG/DL
BUN SERPL-MCNC: 14 MG/DL
CALCIUM SERPL-MCNC: 9.2 MG/DL
CHLORIDE SERPL-SCNC: 102 MMOL/L
CHOLEST SERPL-MCNC: 132 MG/DL
CO2 SERPL-SCNC: 23 MMOL/L
CREAT SERPL-MCNC: 0.68 MG/DL
EGFR: 84 ML/MIN/1.73M2
ESTIMATED AVERAGE GLUCOSE: 131 MG/DL
GLUCOSE SERPL-MCNC: 105 MG/DL
HBA1C MFR BLD HPLC: 6.2 %
HDLC SERPL-MCNC: 57 MG/DL
LDLC SERPL CALC-MCNC: 61 MG/DL
NONHDLC SERPL-MCNC: 74 MG/DL
POTASSIUM SERPL-SCNC: 3.7 MMOL/L
PROT SERPL-MCNC: 7.2 G/DL
SODIUM SERPL-SCNC: 138 MMOL/L
T4 FREE SERPL-MCNC: 1.3 NG/DL
TRIGL SERPL-MCNC: 67 MG/DL
TSH SERPL-ACNC: 1.25 UIU/ML

## 2024-02-09 ENCOUNTER — NON-APPOINTMENT (OUTPATIENT)
Age: 89
End: 2024-02-09

## 2024-02-14 ENCOUNTER — RX RENEWAL (OUTPATIENT)
Age: 89
End: 2024-02-14

## 2024-02-14 RX ORDER — LISINOPRIL 40 MG/1
40 TABLET ORAL DAILY
Qty: 90 | Refills: 1 | Status: ACTIVE | COMMUNITY
Start: 2021-08-23 | End: 1900-01-01

## 2024-02-15 ENCOUNTER — APPOINTMENT (OUTPATIENT)
Dept: MRI IMAGING | Facility: CLINIC | Age: 89
End: 2024-02-15
Payer: MEDICARE

## 2024-02-15 ENCOUNTER — APPOINTMENT (OUTPATIENT)
Dept: ORTHOPEDIC SURGERY | Facility: CLINIC | Age: 89
End: 2024-02-15
Payer: MEDICARE

## 2024-02-15 VITALS — HEIGHT: 63 IN | WEIGHT: 132 LBS | BODY MASS INDEX: 23.39 KG/M2

## 2024-02-15 PROCEDURE — 72100 X-RAY EXAM L-S SPINE 2/3 VWS: CPT

## 2024-02-15 PROCEDURE — 72146 MRI CHEST SPINE W/O DYE: CPT | Mod: MH

## 2024-02-15 PROCEDURE — 99204 OFFICE O/P NEW MOD 45 MIN: CPT

## 2024-02-15 PROCEDURE — 72148 MRI LUMBAR SPINE W/O DYE: CPT | Mod: MH

## 2024-02-15 PROCEDURE — 72070 X-RAY EXAM THORAC SPINE 2VWS: CPT

## 2024-02-15 NOTE — IMAGING
[de-identified] : Spine: Inspection/Palpation: No tenderness to palpation throughout Cervical/thoracic/lumbar spine. mild TTP voer spinous proces at T/L Junction  No bony stepoffs, No lesions.  Gait: antalgic,    Neurologic:  Bilateral Lower Extremities 5/5 Iliopsoas/Quadriceps/Hamstrings/ Tibialis Anterior/ Gastrocnemius. Extensor Hallucis Longus/ Flexor Hallucis Longus except    Sensation intact to light touch L2-S1  Patellar/ Achilles reflex within normal limits.   Negative straight leg raise  No pain with IR/ER/Flexion of HIps bilaterally  x-=ray ap/later thoracic and lumbar spine. Multilevel degen changes ant T10 -T11 VCF of indeterminate age

## 2024-02-15 NOTE — ASSESSMENT
[FreeTextEntry1] : 88 F with LBP and possible acute VF MRI T and L spine to asses acuity of VCF  We will also prescribe Muscle Relaxants as needed.  Discussed side effects including but not limited to drowsiness and dizziness.  Discussed patient should not drive after taking the medicine. FU after imaging

## 2024-02-15 NOTE — HISTORY OF PRESENT ILLNESS
[8] : 8 [Dull/Aching] : dull/aching [Throbbing] : throbbing [de-identified] : 02/15/24; 88 year old Female presents today with complaints of Low back pain and tightness with intermittent BLE anterior thigh/knee pain. Taking tylenol prn with minimal relief.  Symptoms worsen when getting up from seated position, when supine. Denies acute trauma/fall.. Pain started 02/05/23. Denies numbness and tingling.  Denies change in b/b function.  PmHx: HTN, Hx of breast Ca s/p RT 5 years ago, cataracts All:NKDA  [] : no

## 2024-02-16 ENCOUNTER — RX RENEWAL (OUTPATIENT)
Age: 89
End: 2024-02-16

## 2024-02-20 ENCOUNTER — APPOINTMENT (OUTPATIENT)
Dept: INTERNAL MEDICINE | Facility: CLINIC | Age: 89
End: 2024-02-20

## 2024-02-26 ENCOUNTER — APPOINTMENT (OUTPATIENT)
Dept: ORTHOPEDIC SURGERY | Facility: CLINIC | Age: 89
End: 2024-02-26
Payer: MEDICARE

## 2024-02-26 PROCEDURE — 99203 OFFICE O/P NEW LOW 30 MIN: CPT

## 2024-02-26 RX ORDER — TIZANIDINE 2 MG/1
2 TABLET ORAL EVERY 6 HOURS
Qty: 20 | Refills: 2 | Status: ACTIVE | COMMUNITY
Start: 2024-02-15 | End: 1900-01-01

## 2024-02-26 NOTE — ASSESSMENT
[FreeTextEntry1] : 88 F with LB and  acute T12 VCF TLSO FU 2 weeks for repeat x-rays refill of tizanidine

## 2024-02-26 NOTE — IMAGING
[de-identified] : Spine: Inspection/Palpation: No tenderness to palpation throughout Cervical/thoracic/lumbar spine. mild TTP voer spinous proces at T/L Junction  No bony stepoffs, No lesions.  Gait: antalgic,    Neurologic:  Bilateral Lower Extremities 5/5 Iliopsoas/Quadriceps/Hamstrings/ Tibialis Anterior/ Gastrocnemius. Extensor Hallucis Longus/ Flexor Hallucis Longus except    Sensation intact to light touch L2-S1  Patellar/ Achilles reflex within normal limits.   Negative straight leg raise  No pain with IR/ER/Flexion of HIps bilaterally  x-=ray ap/later thoracic and lumbar spine. Multilevel degen changes ant T10 -T11 VCF of indeterminate age

## 2024-02-26 NOTE — DATA REVIEWED
[MRI] : MRI [Thoracic Spine] : thoracic spine [Lumbar Spine] : lumbar spine [I independently reviewed and interpreted images and report] : I independently reviewed and interpreted images and report [FreeTextEntry1] : T12 VCF acute same name as above

## 2024-02-26 NOTE — HISTORY OF PRESENT ILLNESS
[8] : 8 [Throbbing] : throbbing [Dull/Aching] : dull/aching [de-identified] : 02/26/24: Patient returns to review the MRI results. No change in pain level.  02/15/24; 88 year old Female presents today with complaints of Low back pain and tightness with intermittent BLE anterior thigh/knee pain. Taking tylenol prn with minimal relief.  Symptoms worsen when getting up from seated position, when supine. Denies acute trauma/fall.. Pain started 02/05/23. Denies numbness and tingling.  Denies change in b/b function.  PmHx: HTN, Hx of breast Ca s/p RT 5 years ago, cataracts All:NKDA  [] : no

## 2024-02-28 ENCOUNTER — RX RENEWAL (OUTPATIENT)
Age: 89
End: 2024-02-28

## 2024-03-11 ENCOUNTER — APPOINTMENT (OUTPATIENT)
Dept: ORTHOPEDIC SURGERY | Facility: CLINIC | Age: 89
End: 2024-03-11
Payer: MEDICARE

## 2024-03-11 PROCEDURE — 72100 X-RAY EXAM L-S SPINE 2/3 VWS: CPT

## 2024-03-11 PROCEDURE — 99213 OFFICE O/P EST LOW 20 MIN: CPT

## 2024-03-11 NOTE — ASSESSMENT
[FreeTextEntry1] : 88 F with LB and  acute T12 VCF 4 weeks out C/w TLSO FU 2 weeks for repeat x-rays likely PT referral at next visit

## 2024-03-11 NOTE — HISTORY OF PRESENT ILLNESS
[8] : 8 [Dull/Aching] : dull/aching [Throbbing] : throbbing [de-identified] : 03/11/2024: no significant improvements in pain.  Moving around better. Not taking any medication during day.   02/26/24: Patient returns to review the MRI results. No change in pain level.  02/15/24; 88 year old Female presents today with complaints of Low back pain and tightness with intermittent BLE anterior thigh/knee pain. Taking tylenol prn with minimal relief.  Symptoms worsen when getting up from seated position, when supine. Denies acute trauma/fall.. Pain started 02/05/23. Denies numbness and tingling.  Denies change in b/b function.  PmHx: HTN, Hx of breast Ca s/p RT 5 years ago, cataracts All:NKDA  [] : no

## 2024-03-11 NOTE — IMAGING
[de-identified] : Spine: Inspection/Palpation: No tenderness to palpation throughout Cervical/thoracic/lumbar spine. mild TTP voer spinous proces at T/L Junction  No bony stepoffs, No lesions.  Gait: antalgic,    Neurologic:  Bilateral Lower Extremities 5/5 Iliopsoas/Quadriceps/Hamstrings/ Tibialis Anterior/ Gastrocnemius. Extensor Hallucis Longus/ Flexor Hallucis Longus except    Sensation intact to light touch L2-S1  Patellar/ Achilles reflex within normal limits.   Negative straight leg raise  No pain with IR/ER/Flexion of HIps bilaterally  x-=ray ap/later thoracic and lumbar spine. Multilevel degen changes ant T10 -T11 VCF of indeterminate age  x-ray ap/lateral with T12 VCF with increased compression from last x-ray. Overall alignment maintained. 3/22/24

## 2024-03-11 NOTE — DATA REVIEWED
[MRI] : MRI [Thoracic Spine] : thoracic spine [Lumbar Spine] : lumbar spine [I independently reviewed and interpreted images and report] : I independently reviewed and interpreted images and report [FreeTextEntry1] : T12 VCF acute

## 2024-03-25 ENCOUNTER — APPOINTMENT (OUTPATIENT)
Dept: ORTHOPEDIC SURGERY | Facility: CLINIC | Age: 89
End: 2024-03-25
Payer: MEDICARE

## 2024-03-25 VITALS — WEIGHT: 132 LBS | HEIGHT: 63 IN | BODY MASS INDEX: 23.39 KG/M2

## 2024-03-25 PROCEDURE — 72070 X-RAY EXAM THORAC SPINE 2VWS: CPT

## 2024-03-25 PROCEDURE — 99213 OFFICE O/P EST LOW 20 MIN: CPT

## 2024-03-25 RX ORDER — TRAMADOL HYDROCHLORIDE 50 MG/1
50 TABLET, COATED ORAL
Qty: 21 | Refills: 0 | Status: ACTIVE | COMMUNITY
Start: 2024-03-25 | End: 1900-01-01

## 2024-03-25 NOTE — IMAGING
[de-identified] : Spine: Inspection/Palpation: No tenderness to palpation throughout Cervical/thoracic/lumbar spine. mild TTP voer spinous proces at T/L Junction  No bony stepoffs, No lesions.  Gait: antalgic,    Neurologic:  Bilateral Lower Extremities 5/5 Iliopsoas/Quadriceps/Hamstrings/ Tibialis Anterior/ Gastrocnemius. Extensor Hallucis Longus/ Flexor Hallucis Longus except    Sensation intact to light touch L2-S1  Patellar/ Achilles reflex within normal limits.   Negative straight leg raise  No pain with IR/ER/Flexion of HIps bilaterally  x-=ray ap/later thoracic and lumbar spine. Multilevel degen changes ant T10 -T11 VCF of indeterminate age  x-ray ap/lateral with T12 VCF with increased compression from last x-ray. Overall alignment maintained.

## 2024-03-25 NOTE — HISTORY OF PRESENT ILLNESS
[8] : 8 [Dull/Aching] : dull/aching [Throbbing] : throbbing [de-identified] : 03/25/2024: here for fu, reports persistent mid back pain with no improvement. Patient continues to wear TLSO. Tylenol prn. no    03/11/2024: no significant improvements in pain.  Moving around better. Not taking any medication during day.   02/26/24: Patient returns to review the MRI results. No change in pain level.  02/15/24; 88 year old Female presents today with complaints of Low back pain and tightness with intermittent BLE anterior thigh/knee pain. Taking tylenol prn with minimal relief.  Symptoms worsen when getting up from seated position, when supine. Denies acute trauma/fall.. Pain started 02/05/23. Denies numbness and tingling.  Denies change in b/b function.  PmHx: HTN, Hx of breast Ca s/p RT 5 years ago, cataracts All:NKDA  [] : no

## 2024-03-25 NOTE — ASSESSMENT
[FreeTextEntry1] : 88 F with LB and  acute T12 VCF 6 weeks out Pain is sitl in low back  Trail of tramadol and PT FU 4 weeks

## 2024-03-29 ENCOUNTER — RX RENEWAL (OUTPATIENT)
Age: 89
End: 2024-03-29

## 2024-03-29 RX ORDER — FAMOTIDINE 40 MG/1
40 TABLET, FILM COATED ORAL
Qty: 90 | Refills: 1 | Status: ACTIVE | COMMUNITY
Start: 2020-06-09 | End: 1900-01-01

## 2024-03-31 ENCOUNTER — RX RENEWAL (OUTPATIENT)
Age: 89
End: 2024-03-31

## 2024-04-08 NOTE — REASON FOR VISIT
normal performance [Symptom and Test Evaluation] : symptom and test evaluation [Family Member] : family member

## 2024-04-09 ENCOUNTER — RX RENEWAL (OUTPATIENT)
Age: 89
End: 2024-04-09

## 2024-04-26 ENCOUNTER — RX RENEWAL (OUTPATIENT)
Age: 89
End: 2024-04-26

## 2024-04-26 RX ORDER — AMLODIPINE BESYLATE 5 MG/1
5 TABLET ORAL
Qty: 90 | Refills: 0 | Status: ACTIVE | COMMUNITY
Start: 2022-04-19 | End: 1900-01-01

## 2024-04-29 ENCOUNTER — APPOINTMENT (OUTPATIENT)
Dept: INTERNAL MEDICINE | Facility: CLINIC | Age: 89
End: 2024-04-29
Payer: MEDICARE

## 2024-04-29 ENCOUNTER — LABORATORY RESULT (OUTPATIENT)
Age: 89
End: 2024-04-29

## 2024-04-29 VITALS
DIASTOLIC BLOOD PRESSURE: 89 MMHG | WEIGHT: 129 LBS | SYSTOLIC BLOOD PRESSURE: 147 MMHG | OXYGEN SATURATION: 94 % | BODY MASS INDEX: 22.85 KG/M2 | HEART RATE: 75 BPM

## 2024-04-29 VITALS — SYSTOLIC BLOOD PRESSURE: 130 MMHG | DIASTOLIC BLOOD PRESSURE: 80 MMHG

## 2024-04-29 DIAGNOSIS — D64.9 ANEMIA, UNSPECIFIED: ICD-10-CM

## 2024-04-29 DIAGNOSIS — E11.9 TYPE 2 DIABETES MELLITUS W/OUT COMPLICATIONS: ICD-10-CM

## 2024-04-29 DIAGNOSIS — I10 ESSENTIAL (PRIMARY) HYPERTENSION: ICD-10-CM

## 2024-04-29 DIAGNOSIS — E78.5 HYPERLIPIDEMIA, UNSPECIFIED: ICD-10-CM

## 2024-04-29 DIAGNOSIS — N28.9 DISORDER OF KIDNEY AND URETER, UNSPECIFIED: ICD-10-CM

## 2024-04-29 PROCEDURE — G2211 COMPLEX E/M VISIT ADD ON: CPT

## 2024-04-29 PROCEDURE — 99214 OFFICE O/P EST MOD 30 MIN: CPT

## 2024-04-29 PROCEDURE — 36415 COLL VENOUS BLD VENIPUNCTURE: CPT

## 2024-04-29 NOTE — ASSESSMENT
[FreeTextEntry1] : 1.  Hypertension Well-controlled on amlodipine 5 mg, atenolol 100, and lisinopril 40  2.  Hyperlipidemia atorvastatin 10 mg daily  3.  Hemoglobin A1c for diet-controlled type 2 diabetes  4.  Thyroid nodules 5.  A-fib  6.  Chronic monocytosis, abnormal peripheral blood smear.   Repeat eval by hematology was normal.   Repeat CBC  7. Recent compression fracture Has DEXA ordered through GYN. Discuss osteoporosis treatment  Discussed risks of tramadol. Colace prn for constipation Renal lesion-- renal US CXR for incidental ?pleural effusions    8. Skin laceration Superficial Keep clean, dry   Follow-up 3-month for CPE

## 2024-04-29 NOTE — HISTORY OF PRESENT ILLNESS
[FreeTextEntry1] : HTN, HLD  [de-identified] : Kelsey is here today for follow-up.  She recent was diagnosed with vertebral compression fracture.  She has been doing physical therapy through orthopedics.  She is also taking tramadol as needed for pain.  Does have some constipation with the tramadol and was advised to take Colace as needed.  She also recently pain.  She did have some superficial bleeding.  She had been keeping a bandage on it.  No other acute concerns.

## 2024-04-29 NOTE — PHYSICAL EXAM
[No Acute Distress] : no acute distress [Well Nourished] : well nourished [Well Developed] : well developed [Well-Appearing] : well-appearing [Thyroid Normal, No Nodules] : the thyroid was normal and there were no nodules present [No Respiratory Distress] : no respiratory distress  [No Accessory Muscle Use] : no accessory muscle use [Clear to Auscultation] : lungs were clear to auscultation bilaterally [Normal Rate] : normal rate  [Regular Rhythm] : with a regular rhythm [Normal S1, S2] : normal S1 and S2 [No Murmur] : no murmur heard [No Edema] : there was no peripheral edema [de-identified] : Small, superficial laceration on right anterior shin

## 2024-04-29 NOTE — REVIEW OF SYSTEMS
[Constipation] : constipation [Back Pain] : back pain [Fever] : no fever [Chills] : no chills [Fatigue] : no fatigue [Night Sweats] : no night sweats [Chest Pain] : no chest pain [Palpitations] : no palpitations [Shortness Of Breath] : no shortness of breath [Abdominal Pain] : no abdominal pain [Dizziness] : no dizziness

## 2024-04-30 LAB
ALBUMIN SERPL ELPH-MCNC: 4.2 G/DL
ALP BLD-CCNC: 94 U/L
ALT SERPL-CCNC: 14 U/L
ANION GAP SERPL CALC-SCNC: 16 MMOL/L
AST SERPL-CCNC: 20 U/L
BASOPHILS # BLD AUTO: 0.02 K/UL
BASOPHILS NFR BLD AUTO: 0.3 %
BILIRUB SERPL-MCNC: 0.4 MG/DL
BUN SERPL-MCNC: 22 MG/DL
CALCIUM SERPL-MCNC: 8.8 MG/DL
CHLORIDE SERPL-SCNC: 104 MMOL/L
CO2 SERPL-SCNC: 20 MMOL/L
CREAT SERPL-MCNC: 0.82 MG/DL
EGFR: 69 ML/MIN/1.73M2
EOSINOPHIL # BLD AUTO: 0.05 K/UL
EOSINOPHIL NFR BLD AUTO: 0.6 %
ESTIMATED AVERAGE GLUCOSE: 143 MG/DL
GLUCOSE SERPL-MCNC: 184 MG/DL
HBA1C MFR BLD HPLC: 6.6 %
HCT VFR BLD CALC: 34.4 %
HGB BLD-MCNC: 11.5 G/DL
IMM GRANULOCYTES NFR BLD AUTO: 0.6 %
LYMPHOCYTES # BLD AUTO: 1.9 K/UL
LYMPHOCYTES NFR BLD AUTO: 23.8 %
MAN DIFF?: NORMAL
MCHC RBC-ENTMCNC: 28.5 PG
MCHC RBC-ENTMCNC: 33.4 GM/DL
MCV RBC AUTO: 85.1 FL
MONOCYTES # BLD AUTO: 2.65 K/UL
MONOCYTES NFR BLD AUTO: 33.1 %
NEUTROPHILS # BLD AUTO: 3.33 K/UL
NEUTROPHILS NFR BLD AUTO: 41.6 %
PLATELET # BLD AUTO: 158 K/UL
POTASSIUM SERPL-SCNC: 3.9 MMOL/L
PROT SERPL-MCNC: 6.7 G/DL
RBC # BLD: 4.04 M/UL
RBC # FLD: 14.7 %
SODIUM SERPL-SCNC: 140 MMOL/L
WBC # FLD AUTO: 8 K/UL

## 2024-05-02 ENCOUNTER — APPOINTMENT (OUTPATIENT)
Dept: ORTHOPEDIC SURGERY | Facility: CLINIC | Age: 89
End: 2024-05-02
Payer: MEDICARE

## 2024-05-02 PROCEDURE — 99213 OFFICE O/P EST LOW 20 MIN: CPT

## 2024-05-02 NOTE — IMAGING
[de-identified] : Spine: Inspection/Palpation: No tenderness to palpation throughout Cervical/thoracic/lumbar spine. mild TTP voer spinous proces at T/L Junction  No bony stepoffs, No lesions.  Gait: antalgic,    Neurologic:  Bilateral Lower Extremities 5/5 Iliopsoas/Quadriceps/Hamstrings/ Tibialis Anterior/ Gastrocnemius. Extensor Hallucis Longus/ Flexor Hallucis Longus except    Sensation intact to light touch L2-S1  Patellar/ Achilles reflex within normal limits.   Negative straight leg raise  No pain with IR/ER/Flexion of HIps bilaterally  x-=ray ap/later thoracic and lumbar spine. Multilevel degen changes ant T10 -T11 VCF of indeterminate age  x-ray ap/lateral with T12 VCF with increased compression from last x-ray. Overall alignment maintained.

## 2024-05-02 NOTE — HISTORY OF PRESENT ILLNESS
[8] : 8 [Dull/Aching] : dull/aching [Throbbing] : throbbing [de-identified] : 05/02/2024: pain has remain the same  03/25/2024: here for fu, reports persistent mid back pain with no improvement. Patient continues to wear TLSO. Tylenol prn. no    03/11/2024: no significant improvements in pain.  Moving around better. Not taking any medication during day.   02/26/24: Patient returns to review the MRI results. No change in pain level.  02/15/24; 88 year old Female presents today with complaints of Low back pain and tightness with intermittent BLE anterior thigh/knee pain. Taking tylenol prn with minimal relief.  Symptoms worsen when getting up from seated position, when supine. Denies acute trauma/fall.. Pain started 02/05/23. Denies numbness and tingling.  Denies change in b/b function.  PmHx: HTN, Hx of breast Ca s/p RT 5 years ago, cataracts All:NKDA  [] : no

## 2024-05-02 NOTE — ASSESSMENT
[FreeTextEntry1] : 88 F with LB and  acute T12 VCF  pain improving. Not taking nay medication Pain is silt in low back but imrpoving.  C/w PT FU 6 weeks

## 2024-05-13 ENCOUNTER — APPOINTMENT (OUTPATIENT)
Dept: ULTRASOUND IMAGING | Facility: CLINIC | Age: 89
End: 2024-05-13
Payer: MEDICARE

## 2024-05-13 ENCOUNTER — APPOINTMENT (OUTPATIENT)
Dept: RADIOLOGY | Facility: CLINIC | Age: 89
End: 2024-05-13
Payer: MEDICARE

## 2024-05-13 ENCOUNTER — OUTPATIENT (OUTPATIENT)
Dept: OUTPATIENT SERVICES | Facility: HOSPITAL | Age: 89
LOS: 1 days | End: 2024-05-13
Payer: MEDICARE

## 2024-05-13 DIAGNOSIS — N28.9 DISORDER OF KIDNEY AND URETER, UNSPECIFIED: ICD-10-CM

## 2024-05-13 DIAGNOSIS — M48.50XA COLLAPSED VERTEBRA, NOT ELSEWHERE CLASSIFIED, SITE UNSPECIFIED, INITIAL ENCOUNTER FOR FRACTURE: ICD-10-CM

## 2024-05-13 DIAGNOSIS — Z98.890 OTHER SPECIFIED POSTPROCEDURAL STATES: Chronic | ICD-10-CM

## 2024-05-13 PROCEDURE — 76770 US EXAM ABDO BACK WALL COMP: CPT

## 2024-05-13 PROCEDURE — 76770 US EXAM ABDO BACK WALL COMP: CPT | Mod: 26

## 2024-05-13 PROCEDURE — 71046 X-RAY EXAM CHEST 2 VIEWS: CPT | Mod: 26

## 2024-05-13 PROCEDURE — 71046 X-RAY EXAM CHEST 2 VIEWS: CPT

## 2024-05-14 ENCOUNTER — TRANSCRIPTION ENCOUNTER (OUTPATIENT)
Age: 89
End: 2024-05-14

## 2024-05-30 ENCOUNTER — RX RENEWAL (OUTPATIENT)
Age: 89
End: 2024-05-30

## 2024-05-30 RX ORDER — ATENOLOL 100 MG/1
100 TABLET ORAL
Qty: 90 | Refills: 0 | Status: ACTIVE | COMMUNITY
Start: 2022-10-25 | End: 1900-01-01

## 2024-05-30 RX ORDER — APIXABAN 5 MG/1
5 TABLET, FILM COATED ORAL
Qty: 180 | Refills: 2 | Status: ACTIVE | COMMUNITY
Start: 2024-05-30 | End: 1900-01-01

## 2024-05-30 RX ORDER — ALPRAZOLAM 0.5 MG/1
0.5 TABLET ORAL
Qty: 15 | Refills: 0 | Status: ACTIVE | COMMUNITY
Start: 2020-06-23 | End: 1900-01-01

## 2024-05-30 RX ORDER — APIXABAN 5 MG/1
5 TABLET, FILM COATED ORAL
Qty: 90 | Refills: 0 | Status: ACTIVE | COMMUNITY
Start: 2020-07-15 | End: 1900-01-01

## 2024-05-30 RX ORDER — ATORVASTATIN CALCIUM 10 MG/1
10 TABLET, FILM COATED ORAL
Qty: 90 | Refills: 0 | Status: ACTIVE | COMMUNITY
Start: 2022-04-19 | End: 1900-01-01

## 2024-06-17 ENCOUNTER — APPOINTMENT (OUTPATIENT)
Dept: ORTHOPEDIC SURGERY | Facility: CLINIC | Age: 89
End: 2024-06-17
Payer: MEDICARE

## 2024-06-17 VITALS — BODY MASS INDEX: 22.86 KG/M2 | WEIGHT: 129 LBS | HEIGHT: 63 IN

## 2024-06-17 DIAGNOSIS — M48.50XA COLLAPSED VERTEBRA, NOT ELSEWHERE CLASSIFIED, SITE UNSPECIFIED, INITIAL ENCOUNTER FOR FRACTURE: ICD-10-CM

## 2024-06-17 DIAGNOSIS — M54.50 LOW BACK PAIN, UNSPECIFIED: ICD-10-CM

## 2024-06-17 PROCEDURE — 99213 OFFICE O/P EST LOW 20 MIN: CPT

## 2024-06-17 NOTE — HISTORY OF PRESENT ILLNESS
[Dull/Aching] : dull/aching [Throbbing] : throbbing [de-identified] : 6/17/24  Here for follow-up[. Pain is worse after standing. Needs to sit and rest which improves symptoms.  Can stand or walk usually for 1-1-1.5 hours before she needs to rest   05/02/2024: pain has remain the same  03/25/2024: here for fu, reports persistent mid back pain with no improvement. Patient continues to wear TLSO. Tylenol prn. no    03/11/2024: no significant improvements in pain.  Moving around better. Not taking any medication during day.   02/26/24: Patient returns to review the MRI results. No change in pain level.  02/15/24; 88 year old Female presents today with complaints of Low back pain and tightness with intermittent BLE anterior thigh/knee pain. Taking tylenol prn with minimal relief.  Symptoms worsen when getting up from seated position, when supine. Denies acute trauma/fall.. Pain started 02/05/23. Denies numbness and tingling.  Denies change in b/b function.  PmHx: HTN, Hx of breast Ca s/p RT 5 years ago, cataracts All:NKDA  [] : no [FreeTextEntry5] : continuing PT.

## 2024-06-17 NOTE — ASSESSMENT
[FreeTextEntry1] : 88 F with LB and  acute T12 VCF  pain improving. Not taking nay medication Pain is silt in low back but improving.  Now wiht more neurogenic claudication picture  C/w PT Pain  management referral  for stenosis  FU 6 weeks

## 2024-06-17 NOTE — IMAGING
[de-identified] : Spine: Inspection/Palpation: No tenderness to palpation throughout Cervical/thoracic/lumbar spine. mild TTP voer spinous proces at T/L Junction  No bony stepoffs, No lesions.  Gait: antalgic,    Neurologic:  Bilateral Lower Extremities 5/5 Iliopsoas/Quadriceps/Hamstrings/ Tibialis Anterior/ Gastrocnemius. Extensor Hallucis Longus/ Flexor Hallucis Longus except    Sensation intact to light touch L2-S1  Patellar/ Achilles reflex within normal limits.   Negative straight leg raise  No pain with IR/ER/Flexion of HIps bilaterally  x-=ray ap/later thoracic and lumbar spine. Multilevel degen changes ant T10 -T11 VCF of indeterminate age  x-ray ap/lateral with T12 VCF with increased compression from last x-ray. Overall alignment maintained.

## 2024-06-24 ENCOUNTER — NON-APPOINTMENT (OUTPATIENT)
Age: 89
End: 2024-06-24

## 2024-07-09 NOTE — H&P ADULT - NEUROLOGICAL DETAILS
Please initate a Prior authorization on Weogvy  emaglutide-Weight Management (WEGOVY) 0.25 MG/0.5ML Inject 0.5 mL (0.25 mg total) under the skin once a week for 28 days     
alert and oriented x 3/sensation intact/cranial nerves intact/normal strength

## 2024-07-23 ENCOUNTER — APPOINTMENT (OUTPATIENT)
Dept: PAIN MANAGEMENT | Facility: CLINIC | Age: 89
End: 2024-07-23
Payer: MEDICARE

## 2024-07-23 VITALS — BODY MASS INDEX: 21.17 KG/M2 | WEIGHT: 124 LBS | HEIGHT: 64 IN

## 2024-07-23 DIAGNOSIS — M54.50 LOW BACK PAIN, UNSPECIFIED: ICD-10-CM

## 2024-07-23 PROCEDURE — 99214 OFFICE O/P EST MOD 30 MIN: CPT

## 2024-07-23 NOTE — HISTORY OF PRESENT ILLNESS
[Lower back] : lower back [8] : 8 [Dull/Aching] : dull/aching [Radiating] : radiating [Sharp] : sharp [Stabbing] : stabbing [Constant] : constant [Household chores] : household chores [Leisure] : leisure [Physical therapy] : physical therapy [Standing] : standing [Walking] : walking [FreeTextEntry1] : 07/24/2024 : Patient presents for initial evaluation. She is having pain on her lower back. She has been going to PT with some relief. Pain is located in the mid thoracic spine and radiates to her chest. no n/t.  no weakness. Pain radiates up and down.   Subjective Weakness: Yes   Numbness/Tingling: No   Bladder/Bowel dysfunction: No   Treatments Tried:   Physical therapy  Attempted modalities for current pain complaint:  See above:  Medications: No     Injections: No     Previous Spine Surgery: No      Imaging:  MRI Lumbar Spine (2/15/24) Acute superior endplate compression fracture T12 with loss of height posterior bony ridge impressing on the thecal sac no significant stenosis Convex right curvature with multilevel loss of disc signal lumbar levels with additional loss of disc height L1-2 and L5-S1 Modic endplate changes neuro no lumbar vertebral body fracture T12-L1 broad bulge spondylitic ridge herniation L1-2 Minor retrolisthesis broad bulge spondylitic ridge facet arthrosis ligamentum flavum hypertrophy and facet effusion with inferior foraminal stenosis bulge and spondylitic ridge impressing on the thecal sac were noted asymmetric to left of midline with central mild stenosis L2-3 Minor retrolisthesis broad bulge facet arthrosis ligamentum flavum hypertrophy with facet effusion with foraminal extension of bulges versus foraminal herniation and foraminal stenosis broad asymmetric to left herniation compressing the thecal sac and mild left sided canal stenosis L3-4 Minor retrolisthesis of broad bulge facet arthrosis ligamentum flavum hypertrophy foraminal extension of bulges versus foraminal herniations and foraminal stenosis broad bulge and superimposed herniation impressing the thecal sac with mild to moderate central stenosis L4-5 broad bulge facet arthrosis ligamentum flavum hypertrophy left foraminal herniation and left foraminal stenosis broad central herniation with mild central stenosis L5-S1 grade 1 anterior spondylolisthesis broad bulge spondylitic ridge facet arthrosis ligamentum flavum hypertrophy with foraminal extension of bulges and foraminal stenosis broad central herniation compressing thecal sac contact but no displacement S1 nerve root and mild central stenosis Left renal lesions likely cyst ultrasound suggested for further evaluation 10.2 cm nonspecific fluid signal intensity lesion anterior to the left side S2 and S3 this may represent adnexal lesion in this nonspecific ultrasound of the pelvis or MRI of the pelvis with and without contrast suggested for further evaluation Diverticulosis Thoraci MRI-acute superior endplate compression fracture T11 when counting down from on the deltoid disc corresponds to the T12 compression fracture noted on the lumbar spine when counting up from level L5 posterior superior bony ridging with impressing on the thecal sac with no significant stenosis Diffuse loss of disc signal and height T5-6 bulge with central herniation T7-8 bulge and central asymmetric to left herniation T8-9 bulge with central and asymmetric to right herniation T9 10 bulge with spondylitic ridging and asymmetric to left herniation T10-11 dilated right nerve root sheath bulge spondylitic ridge is more noted asymmetric to right impressing on the thecal sac Bilateral pleural effusions with atelectasis or consolidation in the lung bases correlate with chest x-ray and/or CAT scan of the chest as well as patient's clinical history   [] : no [de-identified] : [facility] AND THORACIC

## 2024-07-23 NOTE — ASSESSMENT
[FreeTextEntry1] : After discussing various treatment options with the patient including but not limited to oral medications, physical therapy, exercise, modalities as well as interventional spinal injections, we have decided with the following plan:   1) Intervention Injection Therapy: I personally reviewed the MRI/CT scan images and agree with the radiologist's report. The radiological findings were discussed with the patient. The risks, benefits, contents and alternatives to injection were explained in full to the patient. Risks outlined include but are not limited to infection, sepsis, bleeding, post-dural puncture headache, nerve damage, temporary increase in pain, syncopal episode, failure to resolve symptoms, allergic reaction, symptom recurrence, and elevation of blood sugar in diabetics. Cortisone may cause immunosuppression. Patient understands the risks. All questions were answered. After discussion of options, patient requested an injection. Information regarding the injection was given to the patient. Which medications to stop prior to the injection was explained to the patient as well.   Follow up in 1-2 weeks post injection for re-evaluation. Continue Home exercises, stretching, activity modification, physical therapy, and conservative care.  Patient is presenting with acute/sub-acute radicular pain with impairment in ADLs and functionality.  The pain has not responded sufficiently to  conservative care including nsaid therapy and/or physical therapy.  There is no bleeding tendency, unstable medical condition, or systemic infection. The purpose of the spinal injections is to facilitate active therapy by providing short term relief through reduction of pain and inflammation.   Injections, by themselves, are not likely to provide long-term relief. Rather, active rehabilitation with modified work achieves long-term relief by increasing active ROM, strength and stability.   LESI L1/2.   2) The patient would benefit from continuation of physical therapy. Short- and Long-Term goals would be improvement of pain level, improvement of range of motion, improvement of strength and overall improvement of quality of life.   Patient instructed to continue both active and passive therapy, at home as an extension of the treatment process in order to maintain improvement.   Goals: improve cardiovascular fitness, reduce edema, improve muscle strength, improve connective tissue strength and integrity, increase bone density, promote circulation to enhance soft tissue healing, improvement of muscle recruitment, increased ROM and promotion of normal movement.

## 2024-07-23 NOTE — PHYSICAL EXAM
[] : no lumbar paraspinal tenderness [3___] : left dorsiflexors 3[unfilled]/5 [2___] : left extensor hallicus longus 2[unfilled]/5

## 2024-07-25 ENCOUNTER — NON-APPOINTMENT (OUTPATIENT)
Age: 89
End: 2024-07-25

## 2024-07-25 ENCOUNTER — RX RENEWAL (OUTPATIENT)
Age: 89
End: 2024-07-25

## 2024-08-17 ENCOUNTER — RX RENEWAL (OUTPATIENT)
Age: 89
End: 2024-08-17

## 2024-09-03 ENCOUNTER — APPOINTMENT (OUTPATIENT)
Dept: PAIN MANAGEMENT | Facility: CLINIC | Age: 89
End: 2024-09-03

## 2024-09-03 DIAGNOSIS — M54.50 LOW BACK PAIN, UNSPECIFIED: ICD-10-CM

## 2024-09-03 PROCEDURE — 62323 NJX INTERLAMINAR LMBR/SAC: CPT

## 2024-09-03 NOTE — PROCEDURE
[FreeTextEntry3] : Date of Service: 09/03/2024   Account: [id number]   Patient: PREET CHUA   YOB: 1935   Age: 89 year     Surgeon: Nathalia Zheng M.D.   Pre-Operative Diagnosis:  Lumbosacral Radiculitis (M54.17)   Post Operative Diagnosis: Lumbosacral Radiculitis (M54.17)   Procedure: Interlaminar lumbar epidural steroid injection (L1-2) under fluoroscopic guidance   Anesthesia:  None     This procedure was carried out using fluoroscopic guidance.  The risks and benefits of the procedure were discussed extensively with the patient.  The consent of the patient was obtained and the following procedure was performed.   The patient was placed in the prone position.  The lumbar area was prepped and draped in a sterile fashion.  Under AP view with slight cephalad-caudad angulation, the L1-2 interspace was identified and marked.  Using sterile technique the superficial skin was anesthetized with 1% Lidocaine without epinephrine.  A 20 gauge Tuohoy needle was advanced into the epidural space under fluoroscopy using vohvg-ojxoskljr-merms technique and using loss of resistance at the L2-3 level.  After negative aspiration for heme or CSF, an epidurogram was obtained using 3 cc Omnipaque contrast confirming epidural placement of the needle.    Lumbar epidurogram showed no intrathecal or intravascular spread and showed adequate bilateral epidural spread from L1 to S1 levels.   After this, 5 cc of preservative free normal saline and 12mg Celestone were injected into the epidural space.   The needle was subsequently removed.    The patient tolerated the procedure well and was instructed to contact me immediately if there were any problems.   Nathalia Zheng M.D.

## 2024-09-05 ENCOUNTER — RX RENEWAL (OUTPATIENT)
Age: 89
End: 2024-09-05

## 2024-09-11 NOTE — ED ADULT NURSE NOTE - NSSUHOSCREENINGYN_ED_ALL_ED
Nurses Note -- 4 Eyes      9/11/2024   3:18 PM      Skin assessed during: Admit      [x] No Altered Skin Integrity Present    [x]Prevention Measures Documented      [] Yes- Altered Skin Integrity Present or Discovered   [] LDA Added if Not in Epic (Describe Wound)   [] New Altered Skin Integrity was Present on Admit and Documented in LDA   [] Wound Image Taken    Wound Care Consulted? No    Attending Nurse:  Mariela Mccarthy RN/Staff Member:  Joselito BEAUCHAMP         
No - the patient is unable to be screened due to medical condition

## 2024-09-17 ENCOUNTER — LABORATORY RESULT (OUTPATIENT)
Age: 89
End: 2024-09-17

## 2024-09-17 ENCOUNTER — APPOINTMENT (OUTPATIENT)
Dept: INTERNAL MEDICINE | Facility: CLINIC | Age: 89
End: 2024-09-17
Payer: MEDICARE

## 2024-09-17 VITALS
TEMPERATURE: 98.2 F | SYSTOLIC BLOOD PRESSURE: 182 MMHG | OXYGEN SATURATION: 100 % | HEART RATE: 71 BPM | WEIGHT: 124 LBS | BODY MASS INDEX: 21.17 KG/M2 | RESPIRATION RATE: 12 BRPM | HEIGHT: 64 IN | DIASTOLIC BLOOD PRESSURE: 97 MMHG

## 2024-09-17 VITALS — DIASTOLIC BLOOD PRESSURE: 86 MMHG | SYSTOLIC BLOOD PRESSURE: 152 MMHG

## 2024-09-17 DIAGNOSIS — E78.5 HYPERLIPIDEMIA, UNSPECIFIED: ICD-10-CM

## 2024-09-17 DIAGNOSIS — I48.91 UNSPECIFIED ATRIAL FIBRILLATION: ICD-10-CM

## 2024-09-17 DIAGNOSIS — Z00.00 ENCOUNTER FOR GENERAL ADULT MEDICAL EXAMINATION W/OUT ABNORMAL FINDINGS: ICD-10-CM

## 2024-09-17 DIAGNOSIS — I10 ESSENTIAL (PRIMARY) HYPERTENSION: ICD-10-CM

## 2024-09-17 DIAGNOSIS — F41.9 ANXIETY DISORDER, UNSPECIFIED: ICD-10-CM

## 2024-09-17 DIAGNOSIS — E11.9 TYPE 2 DIABETES MELLITUS W/OUT COMPLICATIONS: ICD-10-CM

## 2024-09-17 PROCEDURE — G0439: CPT

## 2024-09-17 PROCEDURE — 99213 OFFICE O/P EST LOW 20 MIN: CPT | Mod: 25

## 2024-09-17 PROCEDURE — 93000 ELECTROCARDIOGRAM COMPLETE: CPT

## 2024-09-17 PROCEDURE — G0008: CPT

## 2024-09-17 PROCEDURE — 36415 COLL VENOUS BLD VENIPUNCTURE: CPT

## 2024-09-17 PROCEDURE — 90662 IIV NO PRSV INCREASED AG IM: CPT

## 2024-09-17 NOTE — HISTORY OF PRESENT ILLNESS
[FreeTextEntry1] : CPE [de-identified] : Kesley is here today for annual physical.  She has been feeling well overall.  She recent underwent LESI to the lumbar spine.  Has had some improvement, though still some pain.  She has a follow-up coming up.  She also saw Dr. Ga for urology follow-up of the renal lesion.  He sent her for an ultrasound.  She has her oncology appointment coming up on October 15.  No other acute concerns.  BP was initially high but only took medicines for minutes prior to arrival.  It improved by the end of the visit.

## 2024-09-17 NOTE — HEALTH RISK ASSESSMENT
[No] : In the past 12 months have you used drugs other than those required for medical reasons? No [0] : 2) Feeling down, depressed, or hopeless: Not at all (0) [PHQ-2 Negative - No further assessment needed] : PHQ-2 Negative - No further assessment needed [[id number]] : 0 [Never] : Never [Patient reported mammogram was normal] : Patient reported mammogram was normal [MammogramComments] : Reports done earlier this year.  Records requested [BoneDensityComments] : Reports done earlier this year.  Records requested

## 2024-09-17 NOTE — REVIEW OF SYSTEMS
[Fever] : no fever [Chills] : no chills [Fatigue] : no fatigue [Night Sweats] : no night sweats [Chest Pain] : no chest pain [Palpitations] : no palpitations [Shortness Of Breath] : no shortness of breath [Wheezing] : no wheezing [Cough] : no cough [Abdominal Pain] : no abdominal pain [Nausea] : no nausea [Constipation] : constipation [Diarrhea] : diarrhea [Vomiting] : no vomiting [Dysuria] : no dysuria [Hematuria] : no hematuria [Joint Pain] : no joint pain [Muscle Pain] : no muscle pain [Back Pain] : back pain [Headache] : no headache [Dizziness] : no dizziness

## 2024-09-17 NOTE — ASSESSMENT
[FreeTextEntry1] :  CPE CBC,CMP, A1c, lipids, UA EKG is SR  HCM: Mammogram: Reports done earlier this year, records requested [place]:  Prev stool fit positive. Saw GI ([facility]). Colonoscopy deferred given stability. Recheck stool FIT DEXA: Endo: [facility], reports DEXA done. Records requested  Immunizations: Flu shot today  1.  Hypertension High initially though improvied amlodipine 5 mg, atenolol 100, and lisinopril 40  2.  Hyperlipidemia atorvastatin 10 mg daily  3.  Hemoglobin A1c for diet-controlled type 2 diabetes Endo: [facility]  4.  Thyroid nodules Endo: [facility]  5.  A-fib AC with Eliquis  F/U 3 months

## 2024-09-17 NOTE — PHYSICAL EXAM
[No Acute Distress] : no acute distress [Well Nourished] : well nourished [Well Developed] : well developed [Well-Appearing] : well-appearing [No Lymphadenopathy] : no lymphadenopathy [Thyroid Normal, No Nodules] : the thyroid was normal and there were no nodules present [No Respiratory Distress] : no respiratory distress  [No Accessory Muscle Use] : no accessory muscle use [Clear to Auscultation] : lungs were clear to auscultation bilaterally [Normal Rate] : normal rate  [Regular Rhythm] : with a regular rhythm [Normal S1, S2] : normal S1 and S2 [No Murmur] : no murmur heard [No Carotid Bruits] : no carotid bruits [No Abdominal Bruit] : a ~M bruit was not heard ~T in the abdomen [No Edema] : there was no peripheral edema [Normal Appearance] : normal in appearance [No Masses] : no palpable masses [No Axillary Lymphadenopathy] : no axillary lymphadenopathy [Soft] : abdomen soft [Non Tender] : non-tender [Non-distended] : non-distended [Normal Bowel Sounds] : normal bowel sounds [Normal Supraclavicular Nodes] : no supraclavicular lymphadenopathy [Normal Axillary Nodes] : no axillary lymphadenopathy [Normal Posterior Cervical Nodes] : no posterior cervical lymphadenopathy [Normal Anterior Cervical Nodes] : no anterior cervical lymphadenopathy [Normal Gait] : normal gait [Alert and Oriented x3] : oriented to person, place, and time

## 2024-09-18 LAB
25(OH)D3 SERPL-MCNC: 28.8 NG/ML
ALBUMIN SERPL ELPH-MCNC: 4 G/DL
ALP BLD-CCNC: 89 U/L
ALT SERPL-CCNC: 12 U/L
ANION GAP SERPL CALC-SCNC: 13 MMOL/L
AST SERPL-CCNC: 23 U/L
BASOPHILS # BLD AUTO: 0 K/UL
BASOPHILS NFR BLD AUTO: 0 %
BILIRUB SERPL-MCNC: 0.4 MG/DL
BUN SERPL-MCNC: 15 MG/DL
CALCIUM SERPL-MCNC: 8.6 MG/DL
CHLORIDE SERPL-SCNC: 102 MMOL/L
CHOLEST SERPL-MCNC: 131 MG/DL
CO2 SERPL-SCNC: 23 MMOL/L
CREAT SERPL-MCNC: 0.77 MG/DL
EGFR: 74 ML/MIN/1.73M2
EOSINOPHIL # BLD AUTO: 0 K/UL
EOSINOPHIL NFR BLD AUTO: 0 %
ESTIMATED AVERAGE GLUCOSE: 134 MG/DL
GLUCOSE SERPL-MCNC: 84 MG/DL
HBA1C MFR BLD HPLC: 6.3 %
HCT VFR BLD CALC: 36.1 %
HDLC SERPL-MCNC: 50 MG/DL
HGB BLD-MCNC: 11.7 G/DL
LDLC SERPL CALC-MCNC: 66 MG/DL
LYMPHOCYTES # BLD AUTO: 1.82 K/UL
LYMPHOCYTES NFR BLD AUTO: 21.7 %
MAN DIFF?: NORMAL
MCHC RBC-ENTMCNC: 28.5 PG
MCHC RBC-ENTMCNC: 32.4 GM/DL
MCV RBC AUTO: 87.8 FL
MONOCYTES # BLD AUTO: 2.71 K/UL
MONOCYTES NFR BLD AUTO: 32.2 %
NEUTROPHILS # BLD AUTO: 3.66 K/UL
NEUTROPHILS NFR BLD AUTO: 42.6 %
NONHDLC SERPL-MCNC: 81 MG/DL
PLATELET # BLD AUTO: 154 K/UL
POTASSIUM SERPL-SCNC: 4 MMOL/L
PROT SERPL-MCNC: 6.5 G/DL
RBC # BLD: 4.11 M/UL
RBC # FLD: 14.7 %
SODIUM SERPL-SCNC: 137 MMOL/L
TRIGL SERPL-MCNC: 73 MG/DL
TSH SERPL-ACNC: 1.26 UIU/ML
WBC # FLD AUTO: 8.41 K/UL

## 2024-09-24 ENCOUNTER — APPOINTMENT (OUTPATIENT)
Dept: PAIN MANAGEMENT | Facility: CLINIC | Age: 89
End: 2024-09-24
Payer: MEDICARE

## 2024-09-24 VITALS — HEIGHT: 64 IN | WEIGHT: 124 LBS | BODY MASS INDEX: 21.17 KG/M2

## 2024-09-24 DIAGNOSIS — M47.814 SPONDYLOSIS W/OUT MYELOPATHY OR RADICULOPATHY, THORACIC REGION: ICD-10-CM

## 2024-09-24 PROCEDURE — 99214 OFFICE O/P EST MOD 30 MIN: CPT
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
## 2024-09-24 NOTE — PHYSICAL EXAM
[3___] : left dorsiflexors 3[unfilled]/5 [2___] : left extensor hallicus longus 2[unfilled]/5 [] : no lumbar paraspinal tenderness

## 2024-10-03 ENCOUNTER — RX RENEWAL (OUTPATIENT)
Age: 89
End: 2024-10-03

## 2024-10-08 ENCOUNTER — APPOINTMENT (OUTPATIENT)
Dept: PAIN MANAGEMENT | Facility: CLINIC | Age: 89
End: 2024-10-08
Payer: MEDICARE

## 2024-10-08 DIAGNOSIS — M54.50 LOW BACK PAIN, UNSPECIFIED: ICD-10-CM

## 2024-10-08 PROCEDURE — 62321 NJX INTERLAMINAR CRV/THRC: CPT

## 2024-10-21 ENCOUNTER — RX RENEWAL (OUTPATIENT)
Age: 89
End: 2024-10-21

## 2024-10-22 ENCOUNTER — APPOINTMENT (OUTPATIENT)
Dept: PAIN MANAGEMENT | Facility: CLINIC | Age: 89
End: 2024-10-22
Payer: MEDICARE

## 2024-10-22 VITALS — HEIGHT: 64 IN | WEIGHT: 127 LBS | BODY MASS INDEX: 21.68 KG/M2

## 2024-10-22 DIAGNOSIS — M47.814 SPONDYLOSIS W/OUT MYELOPATHY OR RADICULOPATHY, THORACIC REGION: ICD-10-CM

## 2024-10-22 DIAGNOSIS — M54.50 LOW BACK PAIN, UNSPECIFIED: ICD-10-CM

## 2024-10-22 PROCEDURE — 99214 OFFICE O/P EST MOD 30 MIN: CPT

## 2024-10-22 RX ORDER — LIDOCAINE 5% 700 MG/1
5 PATCH TOPICAL
Qty: 60 | Refills: 0 | Status: ACTIVE | COMMUNITY
Start: 2024-10-22 | End: 1900-01-01

## 2024-10-25 ENCOUNTER — RX RENEWAL (OUTPATIENT)
Age: 89
End: 2024-10-25

## 2024-10-28 ENCOUNTER — NON-APPOINTMENT (OUTPATIENT)
Age: 89
End: 2024-10-28

## 2024-10-31 ENCOUNTER — APPOINTMENT (OUTPATIENT)
Dept: INTERNAL MEDICINE | Facility: CLINIC | Age: 89
End: 2024-10-31

## 2024-10-31 ENCOUNTER — LABORATORY RESULT (OUTPATIENT)
Age: 89
End: 2024-10-31

## 2024-10-31 VITALS
HEIGHT: 64 IN | DIASTOLIC BLOOD PRESSURE: 76 MMHG | RESPIRATION RATE: 12 BRPM | WEIGHT: 126 LBS | BODY MASS INDEX: 21.51 KG/M2 | SYSTOLIC BLOOD PRESSURE: 167 MMHG | HEART RATE: 64 BPM | OXYGEN SATURATION: 96 %

## 2024-10-31 DIAGNOSIS — R79.89 OTHER SPECIFIED ABNORMAL FINDINGS OF BLOOD CHEMISTRY: ICD-10-CM

## 2024-10-31 PROCEDURE — 99213 OFFICE O/P EST LOW 20 MIN: CPT

## 2024-10-31 PROCEDURE — 36415 COLL VENOUS BLD VENIPUNCTURE: CPT

## 2024-10-31 PROCEDURE — G2211 COMPLEX E/M VISIT ADD ON: CPT

## 2024-11-01 ENCOUNTER — LABORATORY RESULT (OUTPATIENT)
Age: 89
End: 2024-11-01

## 2024-11-01 LAB
BASOPHILS # BLD AUTO: 0.07 K/UL
BASOPHILS NFR BLD AUTO: 0.9 %
EOSINOPHIL # BLD AUTO: 0 K/UL
EOSINOPHIL NFR BLD AUTO: 0 %
HCT VFR BLD CALC: 37.1 %
HGB BLD-MCNC: 11.8 G/DL
LYMPHOCYTES # BLD AUTO: 2.2 K/UL
LYMPHOCYTES NFR BLD AUTO: 29.2 %
MAN DIFF?: NORMAL
MCHC RBC-ENTMCNC: 28 PG
MCHC RBC-ENTMCNC: 31.8 G/DL
MCV RBC AUTO: 88.1 FL
MONOCYTES # BLD AUTO: 2.07 K/UL
MONOCYTES NFR BLD AUTO: 27.4 %
NEUTROPHILS # BLD AUTO: 2.74 K/UL
NEUTROPHILS NFR BLD AUTO: 36.3 %
PLATELET # BLD AUTO: 136 K/UL
RBC # BLD: 4.21 M/UL
RBC # FLD: 14.5 %
WBC # FLD AUTO: 7.55 K/UL

## 2024-12-01 ENCOUNTER — NON-APPOINTMENT (OUTPATIENT)
Age: 89
End: 2024-12-01

## 2025-01-17 ENCOUNTER — RX RENEWAL (OUTPATIENT)
Age: 89
End: 2025-01-17

## 2025-01-31 ENCOUNTER — RX RENEWAL (OUTPATIENT)
Age: 89
End: 2025-01-31

## 2025-02-01 ENCOUNTER — RX RENEWAL (OUTPATIENT)
Age: 89
End: 2025-02-01

## 2025-02-18 ENCOUNTER — NON-APPOINTMENT (OUTPATIENT)
Age: 89
End: 2025-02-18

## 2025-03-31 ENCOUNTER — APPOINTMENT (OUTPATIENT)
Dept: ORTHOPEDIC SURGERY | Facility: CLINIC | Age: 89
End: 2025-03-31
Payer: MEDICARE

## 2025-03-31 VITALS — BODY MASS INDEX: 23.56 KG/M2 | WEIGHT: 120 LBS | HEIGHT: 60 IN

## 2025-03-31 DIAGNOSIS — M48.50XA COLLAPSED VERTEBRA, NOT ELSEWHERE CLASSIFIED, SITE UNSPECIFIED, INITIAL ENCOUNTER FOR FRACTURE: ICD-10-CM

## 2025-03-31 DIAGNOSIS — M54.50 LOW BACK PAIN, UNSPECIFIED: ICD-10-CM

## 2025-03-31 PROCEDURE — 99213 OFFICE O/P EST LOW 20 MIN: CPT

## 2025-04-29 ENCOUNTER — RX RENEWAL (OUTPATIENT)
Age: 89
End: 2025-04-29

## 2025-05-29 ENCOUNTER — APPOINTMENT (OUTPATIENT)
Dept: ORTHOPEDIC SURGERY | Facility: CLINIC | Age: 89
End: 2025-05-29
Payer: MEDICARE

## 2025-05-29 VITALS — WEIGHT: 120 LBS | BODY MASS INDEX: 23.56 KG/M2 | HEIGHT: 60 IN

## 2025-05-29 DIAGNOSIS — M54.50 LOW BACK PAIN, UNSPECIFIED: ICD-10-CM

## 2025-05-29 PROCEDURE — 99213 OFFICE O/P EST LOW 20 MIN: CPT

## 2025-05-31 ENCOUNTER — RX RENEWAL (OUTPATIENT)
Age: 89
End: 2025-05-31

## 2025-06-24 ENCOUNTER — APPOINTMENT (OUTPATIENT)
Dept: PAIN MANAGEMENT | Facility: CLINIC | Age: 89
End: 2025-06-24
Payer: MEDICARE

## 2025-06-24 VITALS — HEIGHT: 60 IN | BODY MASS INDEX: 23.56 KG/M2 | WEIGHT: 120 LBS

## 2025-06-24 PROCEDURE — 99214 OFFICE O/P EST MOD 30 MIN: CPT

## 2025-08-01 ENCOUNTER — RX RENEWAL (OUTPATIENT)
Age: 89
End: 2025-08-01

## 2025-08-11 ENCOUNTER — RX RENEWAL (OUTPATIENT)
Age: 89
End: 2025-08-11

## 2025-08-21 ENCOUNTER — RX RENEWAL (OUTPATIENT)
Age: 89
End: 2025-08-21

## 2025-09-04 ENCOUNTER — APPOINTMENT (OUTPATIENT)
Dept: ORTHOPEDIC SURGERY | Facility: CLINIC | Age: 89
End: 2025-09-04
Payer: MEDICARE

## 2025-09-04 DIAGNOSIS — M54.50 LOW BACK PAIN, UNSPECIFIED: ICD-10-CM

## 2025-09-04 PROCEDURE — 99213 OFFICE O/P EST LOW 20 MIN: CPT

## 2025-09-05 ENCOUNTER — APPOINTMENT (OUTPATIENT)
Age: 89
End: 2025-09-05
Payer: MEDICARE

## 2025-09-05 VITALS — WEIGHT: 120 LBS | BODY MASS INDEX: 23.44 KG/M2

## 2025-09-05 VITALS
RESPIRATION RATE: 14 BRPM | HEIGHT: 60 IN | DIASTOLIC BLOOD PRESSURE: 92 MMHG | HEART RATE: 67 BPM | OXYGEN SATURATION: 94 % | SYSTOLIC BLOOD PRESSURE: 174 MMHG

## 2025-09-05 DIAGNOSIS — I48.91 UNSPECIFIED ATRIAL FIBRILLATION: ICD-10-CM

## 2025-09-05 PROCEDURE — 93000 ELECTROCARDIOGRAM COMPLETE: CPT

## 2025-09-05 PROCEDURE — 99214 OFFICE O/P EST MOD 30 MIN: CPT

## 2025-09-05 PROCEDURE — G2211 COMPLEX E/M VISIT ADD ON: CPT

## 2025-09-08 ENCOUNTER — LABORATORY RESULT (OUTPATIENT)
Age: 89
End: 2025-09-08

## 2025-09-09 ENCOUNTER — APPOINTMENT (OUTPATIENT)
Age: 89
End: 2025-09-09
Payer: MEDICARE

## 2025-09-09 VITALS
RESPIRATION RATE: 14 BRPM | BODY MASS INDEX: 23.56 KG/M2 | DIASTOLIC BLOOD PRESSURE: 77 MMHG | WEIGHT: 120 LBS | HEIGHT: 60 IN | SYSTOLIC BLOOD PRESSURE: 161 MMHG | HEART RATE: 63 BPM | OXYGEN SATURATION: 96 %

## 2025-09-09 VITALS — SYSTOLIC BLOOD PRESSURE: 152 MMHG | DIASTOLIC BLOOD PRESSURE: 78 MMHG

## 2025-09-09 DIAGNOSIS — E11.9 TYPE 2 DIABETES MELLITUS W/OUT COMPLICATIONS: ICD-10-CM

## 2025-09-09 DIAGNOSIS — I63.9 CEREBRAL INFARCTION, UNSPECIFIED: ICD-10-CM

## 2025-09-09 DIAGNOSIS — I10 ESSENTIAL (PRIMARY) HYPERTENSION: ICD-10-CM

## 2025-09-09 DIAGNOSIS — E78.5 HYPERLIPIDEMIA, UNSPECIFIED: ICD-10-CM

## 2025-09-09 LAB
ALBUMIN SERPL ELPH-MCNC: 3.9 G/DL
ALP BLD-CCNC: 76 U/L
ALT SERPL-CCNC: 13 U/L
ANION GAP SERPL CALC-SCNC: 13 MMOL/L
AST SERPL-CCNC: 21 U/L
BASOPHILS # BLD AUTO: 0.07 K/UL
BASOPHILS NFR BLD AUTO: 0.9 %
BILIRUB SERPL-MCNC: 0.7 MG/DL
BUN SERPL-MCNC: 17 MG/DL
CALCIUM SERPL-MCNC: 8.8 MG/DL
CHLORIDE SERPL-SCNC: 105 MMOL/L
CHOLEST SERPL-MCNC: 118 MG/DL
CO2 SERPL-SCNC: 23 MMOL/L
CREAT SERPL-MCNC: 0.72 MG/DL
EGFRCR SERPLBLD CKD-EPI 2021: 80 ML/MIN/1.73M2
EOSINOPHIL # BLD AUTO: 0 K/UL
EOSINOPHIL NFR BLD AUTO: 0 %
ESTIMATED AVERAGE GLUCOSE: 146 MG/DL
GLUCOSE SERPL-MCNC: 97 MG/DL
HBA1C MFR BLD HPLC: 6.7 %
HCT VFR BLD CALC: 36.7 %
HDLC SERPL-MCNC: 46 MG/DL
HGB BLD-MCNC: 11.5 G/DL
LDLC SERPL-MCNC: 58 MG/DL
LYMPHOCYTES # BLD AUTO: 2.22 K/UL
LYMPHOCYTES NFR BLD AUTO: 26.8 %
MAN DIFF?: NORMAL
MCHC RBC-ENTMCNC: 27.8 PG
MCHC RBC-ENTMCNC: 31.3 G/DL
MCV RBC AUTO: 88.6 FL
MONOCYTES # BLD AUTO: 2.44 K/UL
MONOCYTES NFR BLD AUTO: 29.5 %
NEUTROPHILS # BLD AUTO: 3.54 K/UL
NEUTROPHILS NFR BLD AUTO: 42.8 %
NONHDLC SERPL-MCNC: 72 MG/DL
PLATELET # BLD AUTO: 128 K/UL
POTASSIUM SERPL-SCNC: 4.2 MMOL/L
PROT SERPL-MCNC: 6.5 G/DL
RBC # BLD: 4.14 M/UL
RBC # FLD: 14.6 %
SODIUM SERPL-SCNC: 141 MMOL/L
TRIGL SERPL-MCNC: 64 MG/DL
TSH SERPL-ACNC: 0.82 UIU/ML
WBC # FLD AUTO: 8.28 K/UL

## 2025-09-09 PROCEDURE — G2211 COMPLEX E/M VISIT ADD ON: CPT

## 2025-09-09 PROCEDURE — 99213 OFFICE O/P EST LOW 20 MIN: CPT

## 2025-09-09 PROCEDURE — G0008: CPT

## 2025-09-09 PROCEDURE — 90662 IIV NO PRSV INCREASED AG IM: CPT
